# Patient Record
Sex: FEMALE | Race: OTHER | NOT HISPANIC OR LATINO
[De-identification: names, ages, dates, MRNs, and addresses within clinical notes are randomized per-mention and may not be internally consistent; named-entity substitution may affect disease eponyms.]

---

## 2017-01-23 ENCOUNTER — APPOINTMENT (OUTPATIENT)
Dept: NEPHROLOGY | Facility: CLINIC | Age: 77
End: 2017-01-23

## 2017-01-23 VITALS
SYSTOLIC BLOOD PRESSURE: 136 MMHG | BODY MASS INDEX: 39.26 KG/M2 | HEART RATE: 94 BPM | HEIGHT: 64 IN | WEIGHT: 229.94 LBS | DIASTOLIC BLOOD PRESSURE: 62 MMHG

## 2017-01-23 VITALS — SYSTOLIC BLOOD PRESSURE: 138 MMHG | DIASTOLIC BLOOD PRESSURE: 60 MMHG

## 2017-02-06 ENCOUNTER — LABORATORY RESULT (OUTPATIENT)
Age: 77
End: 2017-02-06

## 2017-02-06 LAB
ALBUMIN SERPL ELPH-MCNC: 4 G/DL
ANION GAP SERPL CALC-SCNC: 18 MMOL/L
APPEARANCE: CLEAR
BACTERIA: ABNORMAL
BASOPHILS # BLD AUTO: 0.06 K/UL
BASOPHILS NFR BLD AUTO: 0.8 %
BILIRUBIN URINE: NEGATIVE
BLOOD URINE: NEGATIVE
BUN SERPL-MCNC: 52 MG/DL
CALCIUM SERPL-MCNC: 9.2 MG/DL
CALCIUM SERPL-MCNC: 9.2 MG/DL
CHLORIDE SERPL-SCNC: 97 MMOL/L
CO2 SERPL-SCNC: 22 MMOL/L
COLOR: YELLOW
CREAT SERPL-MCNC: 3.59 MG/DL
CREAT SPEC-SCNC: 83 MG/DL
CREAT SPEC-SCNC: 83 MG/DL
CREAT/PROT UR: 2.5 RATIO
EOSINOPHIL # BLD AUTO: 0.49 K/UL
EOSINOPHIL NFR BLD AUTO: 6.7 %
FERRITIN SERPL-MCNC: 81.2 NG/ML
GLUCOSE QUALITATIVE U: NORMAL MG/DL
GLUCOSE SERPL-MCNC: 93 MG/DL
HBA1C MFR BLD HPLC: 5.8 %
HBV CORE IGG+IGM SER QL: NONREACTIVE
HBV SURFACE AB SER QL: REACTIVE
HBV SURFACE AG SER QL: NONREACTIVE
HCT VFR BLD CALC: 33.7 %
HCV AB SER QL: NONREACTIVE
HCV S/CO RATIO: 0.12 S/CO
HGB BLD-MCNC: 10.7 G/DL
HYALINE CASTS: 4 /LPF
IMM GRANULOCYTES NFR BLD AUTO: 0.4 %
IRON SATN MFR SERPL: 19 %
IRON SERPL-MCNC: 61 UG/DL
KETONES URINE: NEGATIVE
LEUKOCYTE ESTERASE URINE: NEGATIVE
LYMPHOCYTES # BLD AUTO: 1.46 K/UL
LYMPHOCYTES NFR BLD AUTO: 20 %
MAGNESIUM SERPL-MCNC: 2.3 MG/DL
MAN DIFF?: NORMAL
MCHC RBC-ENTMCNC: 28.1 PG
MCHC RBC-ENTMCNC: 31.8 GM/DL
MCV RBC AUTO: 88.5 FL
MICROALBUMIN 24H UR DL<=1MG/L-MCNC: 132 MG/DL
MICROALBUMIN/CREAT 24H UR-RTO: 1590 UG/MG
MICROSCOPIC-UA: NORMAL
MONOCYTES # BLD AUTO: 0.34 K/UL
MONOCYTES NFR BLD AUTO: 4.7 %
NEUTROPHILS # BLD AUTO: 4.92 K/UL
NEUTROPHILS NFR BLD AUTO: 67.4 %
NITRITE URINE: NEGATIVE
PARATHYROID HORMONE INTACT: 334 PG/ML
PH URINE: >8.5
PHOSPHATE SERPL-MCNC: 4.7 MG/DL
PLATELET # BLD AUTO: 292 K/UL
POTASSIUM SERPL-SCNC: 5.1 MMOL/L
PROT UR-MCNC: 208 MG/DL
PROTEIN URINE: 300 MG/DL
RBC # BLD: 3.81 M/UL
RBC # FLD: 13.8 %
RED BLOOD CELLS URINE: 4 /HPF
SODIUM SERPL-SCNC: 137 MMOL/L
SPECIFIC GRAVITY URINE: 1.02
SQUAMOUS EPITHELIAL CELLS: 12 /HPF
TIBC SERPL-MCNC: 329 UG/DL
UIBC SERPL-MCNC: 268 UG/DL
URATE SERPL-MCNC: 7.3 MG/DL
UROBILINOGEN URINE: NORMAL MG/DL
WBC # FLD AUTO: 7.3 K/UL
WHITE BLOOD CELLS URINE: 4 /HPF

## 2017-02-07 LAB
25(OH)D3 SERPL-MCNC: 26.6 NG/ML
C3 SERPL-MCNC: 111 MG/DL
IGA 24H UR QL IFE: NORMAL

## 2017-02-08 ENCOUNTER — APPOINTMENT (OUTPATIENT)
Dept: NEPHROLOGY | Facility: CLINIC | Age: 77
End: 2017-02-08

## 2017-02-08 VITALS — WEIGHT: 225.97 LBS | BODY MASS INDEX: 41.58 KG/M2 | OXYGEN SATURATION: 98 % | HEIGHT: 62 IN | HEART RATE: 80 BPM

## 2017-02-08 VITALS — SYSTOLIC BLOOD PRESSURE: 132 MMHG | DIASTOLIC BLOOD PRESSURE: 64 MMHG

## 2017-02-13 ENCOUNTER — APPOINTMENT (OUTPATIENT)
Dept: NEPHROLOGY | Facility: CLINIC | Age: 77
End: 2017-02-13

## 2017-03-29 ENCOUNTER — APPOINTMENT (OUTPATIENT)
Dept: NEPHROLOGY | Facility: CLINIC | Age: 77
End: 2017-03-29

## 2017-03-29 VITALS
SYSTOLIC BLOOD PRESSURE: 126 MMHG | HEIGHT: 62 IN | OXYGEN SATURATION: 98 % | DIASTOLIC BLOOD PRESSURE: 64 MMHG | BODY MASS INDEX: 41.44 KG/M2 | WEIGHT: 225.17 LBS | HEART RATE: 86 BPM

## 2017-03-29 VITALS — SYSTOLIC BLOOD PRESSURE: 128 MMHG | DIASTOLIC BLOOD PRESSURE: 66 MMHG | HEART RATE: 84 BPM

## 2017-03-29 DIAGNOSIS — E79.0 HYPERURICEMIA W/OUT SIGNS OF INFLAMMATORY ARTHRITIS AND TOPHACEOUS DISEASE: ICD-10-CM

## 2017-04-18 ENCOUNTER — APPOINTMENT (OUTPATIENT)
Dept: OPHTHALMOLOGY | Facility: CLINIC | Age: 77
End: 2017-04-18

## 2017-04-18 LAB
25(OH)D3 SERPL-MCNC: 26.8 NG/ML
ALBUMIN SERPL ELPH-MCNC: 3.8 G/DL
ANION GAP SERPL CALC-SCNC: 18 MMOL/L
APPEARANCE: CLEAR
BACTERIA: ABNORMAL
BASOPHILS # BLD AUTO: 0.04 K/UL
BASOPHILS NFR BLD AUTO: 0.8 %
BILIRUBIN URINE: NEGATIVE
BLOOD URINE: NEGATIVE
BUN SERPL-MCNC: 60 MG/DL
CALCIUM SERPL-MCNC: 9.2 MG/DL
CHLORIDE SERPL-SCNC: 101 MMOL/L
CO2 SERPL-SCNC: 24 MMOL/L
COLOR: YELLOW
CREAT SERPL-MCNC: 4.01 MG/DL
CREAT SPEC-SCNC: 57 MG/DL
CREAT/PROT UR: 3.3 RATIO
EOSINOPHIL # BLD AUTO: 0.22 K/UL
EOSINOPHIL NFR BLD AUTO: 4.2 %
GLUCOSE QUALITATIVE U: NORMAL MG/DL
GLUCOSE SERPL-MCNC: 83 MG/DL
HCT VFR BLD CALC: 30.8 %
HGB BLD-MCNC: 9.7 G/DL
HYALINE CASTS: 4 /LPF
IMM GRANULOCYTES NFR BLD AUTO: 0.2 %
KETONES URINE: NEGATIVE
LEUKOCYTE ESTERASE URINE: NEGATIVE
LYMPHOCYTES # BLD AUTO: 1.24 K/UL
LYMPHOCYTES NFR BLD AUTO: 23.8 %
MAGNESIUM SERPL-MCNC: 2.4 MG/DL
MAN DIFF?: NORMAL
MCHC RBC-ENTMCNC: 28.3 PG
MCHC RBC-ENTMCNC: 31.5 GM/DL
MCV RBC AUTO: 89.8 FL
MICROSCOPIC-UA: NORMAL
MONOCYTES # BLD AUTO: 0.38 K/UL
MONOCYTES NFR BLD AUTO: 7.3 %
NEUTROPHILS # BLD AUTO: 3.32 K/UL
NEUTROPHILS NFR BLD AUTO: 63.7 %
NITRITE URINE: NEGATIVE
PH URINE: 8.5
PHOSPHATE SERPL-MCNC: 5.7 MG/DL
PLATELET # BLD AUTO: 261 K/UL
POTASSIUM SERPL-SCNC: 5.4 MMOL/L
PROT UR-MCNC: 190 MG/DL
PROTEIN URINE: 300 MG/DL
RBC # BLD: 3.43 M/UL
RBC # FLD: 14.3 %
RED BLOOD CELLS URINE: 10 /HPF
SODIUM SERPL-SCNC: 143 MMOL/L
SPECIFIC GRAVITY URINE: 1.02
SQUAMOUS EPITHELIAL CELLS: 7 /HPF
URATE SERPL-MCNC: 5.3 MG/DL
UROBILINOGEN URINE: NORMAL MG/DL
WBC # FLD AUTO: 5.21 K/UL
WHITE BLOOD CELLS URINE: 3 /HPF

## 2017-04-19 ENCOUNTER — APPOINTMENT (OUTPATIENT)
Dept: NEPHROLOGY | Facility: CLINIC | Age: 77
End: 2017-04-19

## 2017-04-19 VITALS
DIASTOLIC BLOOD PRESSURE: 62 MMHG | BODY MASS INDEX: 41.22 KG/M2 | HEART RATE: 76 BPM | SYSTOLIC BLOOD PRESSURE: 130 MMHG | HEIGHT: 62 IN | OXYGEN SATURATION: 98 % | WEIGHT: 224 LBS

## 2017-04-19 RX ORDER — ERYTHROPOIETIN 20000 [IU]/ML
20000 INJECTION, SOLUTION INTRAVENOUS; SUBCUTANEOUS
Qty: 1 | Refills: 0 | Status: COMPLETED | OUTPATIENT
Start: 2017-04-19

## 2017-04-19 RX ADMIN — ERYTHROPOIETIN 0 UNIT/ML: 20000 INJECTION, SOLUTION INTRAVENOUS; SUBCUTANEOUS at 00:00

## 2017-05-02 ENCOUNTER — APPOINTMENT (OUTPATIENT)
Dept: NEPHROLOGY | Facility: CLINIC | Age: 77
End: 2017-05-02

## 2017-05-02 VITALS
DIASTOLIC BLOOD PRESSURE: 68 MMHG | OXYGEN SATURATION: 97 % | SYSTOLIC BLOOD PRESSURE: 128 MMHG | HEIGHT: 62 IN | BODY MASS INDEX: 41.18 KG/M2 | WEIGHT: 223.77 LBS | HEART RATE: 91 BPM

## 2017-05-02 RX ORDER — ERYTHROPOIETIN 20000 [IU]/ML
20000 INJECTION, SOLUTION INTRAVENOUS; SUBCUTANEOUS
Qty: 1 | Refills: 0 | Status: COMPLETED | OUTPATIENT
Start: 2017-05-02

## 2017-05-02 RX ADMIN — ERYTHROPOIETIN 0 UNIT/ML: 20000 INJECTION, SOLUTION INTRAVENOUS; SUBCUTANEOUS at 00:00

## 2017-05-03 ENCOUNTER — APPOINTMENT (OUTPATIENT)
Dept: NEPHROLOGY | Facility: CLINIC | Age: 77
End: 2017-05-03

## 2017-05-03 ENCOUNTER — APPOINTMENT (OUTPATIENT)
Dept: TRANSPLANT | Facility: CLINIC | Age: 77
End: 2017-05-03

## 2017-05-08 ENCOUNTER — APPOINTMENT (OUTPATIENT)
Dept: OPHTHALMOLOGY | Facility: CLINIC | Age: 77
End: 2017-05-08

## 2017-05-09 ENCOUNTER — APPOINTMENT (OUTPATIENT)
Dept: OPHTHALMOLOGY | Facility: CLINIC | Age: 77
End: 2017-05-09

## 2017-05-12 ENCOUNTER — LABORATORY RESULT (OUTPATIENT)
Age: 77
End: 2017-05-12

## 2017-05-15 LAB
25(OH)D3 SERPL-MCNC: 28.8 NG/ML
ALBUMIN SERPL ELPH-MCNC: 4 G/DL
ALP BLD-CCNC: 63 U/L
ALT SERPL-CCNC: 7 U/L
ANION GAP SERPL CALC-SCNC: 20 MMOL/L
APPEARANCE: CLEAR
AST SERPL-CCNC: 15 U/L
BACTERIA: NEGATIVE
BASOPHILS # BLD AUTO: 0.05 K/UL
BASOPHILS NFR BLD AUTO: 0.9 %
BILIRUB SERPL-MCNC: 0.3 MG/DL
BILIRUBIN URINE: NEGATIVE
BLOOD URINE: NEGATIVE
BUN SERPL-MCNC: 60 MG/DL
CALCIUM SERPL-MCNC: 9.7 MG/DL
CALCIUM SERPL-MCNC: 9.7 MG/DL
CHLORIDE SERPL-SCNC: 100 MMOL/L
CO2 SERPL-SCNC: 21 MMOL/L
COLOR: YELLOW
CREAT SERPL-MCNC: 4.36 MG/DL
CREAT SPEC-SCNC: 61 MG/DL
CREAT SPEC-SCNC: 61 MG/DL
CREAT/PROT UR: 3.7
EOSINOPHIL # BLD AUTO: 0.16 K/UL
EOSINOPHIL NFR BLD AUTO: 2.7 %
FERRITIN SERPL-MCNC: 24 NG/ML
GLUCOSE QUALITATIVE U: NORMAL MG/DL
GLUCOSE SERPL-MCNC: 94 MG/DL
HBA1C MFR BLD HPLC: 5.3 %
HCT VFR BLD CALC: 33.3 %
HGB BLD-MCNC: 10.4 G/DL
IGA 24H UR QL IFE: NORMAL
IMM GRANULOCYTES NFR BLD AUTO: 0.2 %
IRON SATN MFR SERPL: 13 %
IRON SERPL-MCNC: 39 UG/DL
KETONES URINE: NEGATIVE
LEUKOCYTE ESTERASE URINE: NEGATIVE
LYMPHOCYTES # BLD AUTO: 1.37 K/UL
LYMPHOCYTES NFR BLD AUTO: 23.4 %
MAGNESIUM SERPL-MCNC: 2.4 MG/DL
MAN DIFF?: NORMAL
MCHC RBC-ENTMCNC: 27.7 PG
MCHC RBC-ENTMCNC: 31.2 GM/DL
MCV RBC AUTO: 88.8 FL
MICROALBUMIN 24H UR DL<=1MG/L-MCNC: 150.9 MG/DL
MICROALBUMIN/CREAT 24H UR-RTO: 2474
MICROSCOPIC-UA: NORMAL
MONOCYTES # BLD AUTO: 0.44 K/UL
MONOCYTES NFR BLD AUTO: 7.5 %
NEUTROPHILS # BLD AUTO: 3.83 K/UL
NEUTROPHILS NFR BLD AUTO: 65.3 %
NITRITE URINE: NEGATIVE
PARATHYROID HORMONE INTACT: 361 PG/ML
PH URINE: 7.5
PHOSPHATE SERPL-MCNC: 5.5 MG/DL
PLATELET # BLD AUTO: 331 K/UL
POTASSIUM SERPL-SCNC: 4.6 MMOL/L
PROT SERPL-MCNC: 6.9 G/DL
PROT UR-MCNC: 227 MG/DL
PROTEIN URINE: 300 MG/DL
RBC # BLD: 3.75 M/UL
RBC # FLD: 14.5 %
RED BLOOD CELLS URINE: 1 /HPF
SODIUM SERPL-SCNC: 141 MMOL/L
SPECIFIC GRAVITY URINE: 1.02
SQUAMOUS EPITHELIAL CELLS: 5 /HPF
TIBC SERPL-MCNC: 302 UG/DL
TSH SERPL-ACNC: 2.15 UIU/ML
UIBC SERPL-MCNC: 263 UG/DL
URATE SERPL-MCNC: 4 MG/DL
UROBILINOGEN URINE: NORMAL MG/DL
WBC # FLD AUTO: 5.86 K/UL
WHITE BLOOD CELLS URINE: 1 /HPF

## 2017-05-16 ENCOUNTER — APPOINTMENT (OUTPATIENT)
Dept: NEPHROLOGY | Facility: CLINIC | Age: 77
End: 2017-05-16

## 2017-05-16 VITALS
WEIGHT: 220.46 LBS | BODY MASS INDEX: 40.57 KG/M2 | SYSTOLIC BLOOD PRESSURE: 138 MMHG | DIASTOLIC BLOOD PRESSURE: 64 MMHG | HEIGHT: 62 IN | HEART RATE: 92 BPM | OXYGEN SATURATION: 98 %

## 2017-05-16 VITALS — SYSTOLIC BLOOD PRESSURE: 136 MMHG | DIASTOLIC BLOOD PRESSURE: 62 MMHG

## 2017-07-06 LAB
25(OH)D3 SERPL-MCNC: 30.4 NG/ML
ALBUMIN SERPL ELPH-MCNC: 4.2 G/DL
ANION GAP SERPL CALC-SCNC: 20 MMOL/L
BASOPHILS # BLD AUTO: 0.03 K/UL
BASOPHILS NFR BLD AUTO: 0.5 %
BUN SERPL-MCNC: 56 MG/DL
CALCIUM SERPL-MCNC: 9.3 MG/DL
CHLORIDE SERPL-SCNC: 97 MMOL/L
CO2 SERPL-SCNC: 22 MMOL/L
CREAT SERPL-MCNC: 4.83 MG/DL
EOSINOPHIL # BLD AUTO: 0.18 K/UL
EOSINOPHIL NFR BLD AUTO: 3.1 %
GLUCOSE SERPL-MCNC: 98 MG/DL
HCT VFR BLD CALC: 33.6 %
HGB BLD-MCNC: 11 G/DL
IMM GRANULOCYTES NFR BLD AUTO: 0.2 %
LYMPHOCYTES # BLD AUTO: 1.33 K/UL
LYMPHOCYTES NFR BLD AUTO: 22.7 %
MAGNESIUM SERPL-MCNC: 2.5 MG/DL
MAN DIFF?: NORMAL
MCHC RBC-ENTMCNC: 28.5 PG
MCHC RBC-ENTMCNC: 32.7 GM/DL
MCV RBC AUTO: 87 FL
MONOCYTES # BLD AUTO: 0.38 K/UL
MONOCYTES NFR BLD AUTO: 6.5 %
NEUTROPHILS # BLD AUTO: 3.92 K/UL
NEUTROPHILS NFR BLD AUTO: 67 %
PHOSPHATE SERPL-MCNC: 5.6 MG/DL
PLATELET # BLD AUTO: 294 K/UL
POTASSIUM SERPL-SCNC: 4.7 MMOL/L
RBC # BLD: 3.86 M/UL
RBC # FLD: 15.3 %
SODIUM SERPL-SCNC: 139 MMOL/L
URATE SERPL-MCNC: 4.7 MG/DL
WBC # FLD AUTO: 5.85 K/UL

## 2017-07-12 ENCOUNTER — APPOINTMENT (OUTPATIENT)
Dept: NEPHROLOGY | Facility: CLINIC | Age: 77
End: 2017-07-12

## 2017-07-12 VITALS
BODY MASS INDEX: 41.38 KG/M2 | HEIGHT: 62 IN | HEART RATE: 87 BPM | WEIGHT: 224.87 LBS | OXYGEN SATURATION: 95 % | SYSTOLIC BLOOD PRESSURE: 136 MMHG | DIASTOLIC BLOOD PRESSURE: 66 MMHG

## 2017-07-12 VITALS — DIASTOLIC BLOOD PRESSURE: 64 MMHG | SYSTOLIC BLOOD PRESSURE: 138 MMHG

## 2017-08-08 LAB
BASOPHILS # BLD AUTO: 0.04 K/UL
BASOPHILS NFR BLD AUTO: 0.7 %
EOSINOPHIL # BLD AUTO: 0.19 K/UL
EOSINOPHIL NFR BLD AUTO: 3.3 %
HCT VFR BLD CALC: 29.6 %
HGB BLD-MCNC: 9.7 G/DL
IMM GRANULOCYTES NFR BLD AUTO: 0.2 %
LYMPHOCYTES # BLD AUTO: 1.08 K/UL
LYMPHOCYTES NFR BLD AUTO: 18.6 %
MAN DIFF?: NORMAL
MCHC RBC-ENTMCNC: 28.3 PG
MCHC RBC-ENTMCNC: 32.8 GM/DL
MCV RBC AUTO: 86.3 FL
MONOCYTES # BLD AUTO: 0.32 K/UL
MONOCYTES NFR BLD AUTO: 5.5 %
NEUTROPHILS # BLD AUTO: 4.18 K/UL
NEUTROPHILS NFR BLD AUTO: 71.7 %
PLATELET # BLD AUTO: 265 K/UL
RBC # BLD: 3.43 M/UL
RBC # FLD: 16.3 %
WBC # FLD AUTO: 5.82 K/UL

## 2017-08-09 ENCOUNTER — APPOINTMENT (OUTPATIENT)
Dept: NEPHROLOGY | Facility: CLINIC | Age: 77
End: 2017-08-09
Payer: MEDICARE

## 2017-08-09 VITALS
HEART RATE: 84 BPM | DIASTOLIC BLOOD PRESSURE: 64 MMHG | SYSTOLIC BLOOD PRESSURE: 142 MMHG | HEIGHT: 62 IN | BODY MASS INDEX: 41.41 KG/M2 | WEIGHT: 225 LBS

## 2017-08-09 VITALS — SYSTOLIC BLOOD PRESSURE: 138 MMHG | DIASTOLIC BLOOD PRESSURE: 62 MMHG

## 2017-08-09 LAB
25(OH)D3 SERPL-MCNC: 23.4 NG/ML
ALBUMIN SERPL ELPH-MCNC: 3.9 G/DL
ALP BLD-CCNC: 67 U/L
ALT SERPL-CCNC: 6 U/L
ANION GAP SERPL CALC-SCNC: 19 MMOL/L
APPEARANCE: CLEAR
AST SERPL-CCNC: 10 U/L
BACTERIA: ABNORMAL
BILIRUB SERPL-MCNC: 0.2 MG/DL
BILIRUBIN URINE: NEGATIVE
BLOOD URINE: NEGATIVE
BUN SERPL-MCNC: 61 MG/DL
CALCIUM SERPL-MCNC: 9.3 MG/DL
CALCIUM SERPL-MCNC: 9.3 MG/DL
CHLORIDE SERPL-SCNC: 105 MMOL/L
CHOLEST SERPL-MCNC: 161 MG/DL
CHOLEST/HDLC SERPL: 2.4 RATIO
CO2 SERPL-SCNC: 20 MMOL/L
COLOR: YELLOW
CREAT SERPL-MCNC: 5.07 MG/DL
CREAT SPEC-SCNC: 55 MG/DL
CREAT SPEC-SCNC: 55 MG/DL
CREAT/PROT UR: 6.1 RATIO
FERRITIN SERPL-MCNC: 95 NG/ML
GLUCOSE QUALITATIVE U: 100 MG/DL
GLUCOSE SERPL-MCNC: 103 MG/DL
HBA1C MFR BLD HPLC: 5.1 %
HBV CORE IGG+IGM SER QL: NONREACTIVE
HBV SURFACE AB SER QL: NONREACTIVE
HBV SURFACE AG SER QL: NONREACTIVE
HCV AB SER QL: NONREACTIVE
HCV S/CO RATIO: 0.09 S/CO
HDLC SERPL-MCNC: 68 MG/DL
HYALINE CASTS: 0 /LPF
IRON SATN MFR SERPL: 16 %
IRON SERPL-MCNC: 48 UG/DL
KETONES URINE: NEGATIVE
LDLC SERPL CALC-MCNC: 71 MG/DL
LEUKOCYTE ESTERASE URINE: NEGATIVE
MAGNESIUM SERPL-MCNC: 2.4 MG/DL
MICROALBUMIN 24H UR DL<=1MG/L-MCNC: 211.9 MG/DL
MICROALBUMIN/CREAT 24H UR-RTO: 3853 MG/G
MICROSCOPIC-UA: NORMAL
NITRITE URINE: NEGATIVE
PARATHYROID HORMONE INTACT: 501 PG/ML
PH URINE: 8
PHOSPHATE SERPL-MCNC: 6 MG/DL
POTASSIUM SERPL-SCNC: 5.8 MMOL/L
PROT SERPL-MCNC: 6.5 G/DL
PROT UR-MCNC: 338 MG/DL
PROTEIN URINE: 300 MG/DL
RED BLOOD CELLS URINE: 1 /HPF
SODIUM SERPL-SCNC: 144 MMOL/L
SPECIFIC GRAVITY URINE: 1.01
SQUAMOUS EPITHELIAL CELLS: 8 /HPF
TIBC SERPL-MCNC: 309 UG/DL
TRIGL SERPL-MCNC: 109 MG/DL
TSH SERPL-ACNC: 4.53 UIU/ML
UIBC SERPL-MCNC: 261 UG/DL
URATE SERPL-MCNC: 4 MG/DL
UROBILINOGEN URINE: NORMAL MG/DL
WHITE BLOOD CELLS URINE: 2 /HPF

## 2017-08-09 PROCEDURE — 96372 THER/PROPH/DIAG INJ SC/IM: CPT

## 2017-08-09 PROCEDURE — 99215 OFFICE O/P EST HI 40 MIN: CPT | Mod: 25

## 2017-08-09 RX ADMIN — ERYTHROPOIETIN 0 UNIT/ML: 20000 INJECTION, SOLUTION INTRAVENOUS; SUBCUTANEOUS at 00:00

## 2017-08-22 ENCOUNTER — FORM ENCOUNTER (OUTPATIENT)
Age: 77
End: 2017-08-22

## 2017-08-22 ENCOUNTER — APPOINTMENT (OUTPATIENT)
Dept: NEPHROLOGY | Facility: CLINIC | Age: 77
End: 2017-08-22
Payer: MEDICARE

## 2017-08-22 VITALS
BODY MASS INDEX: 41.38 KG/M2 | DIASTOLIC BLOOD PRESSURE: 58 MMHG | HEIGHT: 62 IN | SYSTOLIC BLOOD PRESSURE: 156 MMHG | WEIGHT: 224.88 LBS

## 2017-08-22 VITALS — WEIGHT: 224.87 LBS | BODY MASS INDEX: 41.38 KG/M2 | HEIGHT: 62 IN | OXYGEN SATURATION: 98 % | HEART RATE: 91 BPM

## 2017-08-22 LAB
ALBUMIN SERPL ELPH-MCNC: 3.8 G/DL
ANION GAP SERPL CALC-SCNC: 18 MMOL/L
BASOPHILS # BLD AUTO: 0.05 K/UL
BASOPHILS NFR BLD AUTO: 0.9 %
BUN SERPL-MCNC: 54 MG/DL
CALCIUM SERPL-MCNC: 9.3 MG/DL
CHLORIDE SERPL-SCNC: 98 MMOL/L
CO2 SERPL-SCNC: 21 MMOL/L
CREAT SERPL-MCNC: 5.59 MG/DL
EOSINOPHIL # BLD AUTO: 0.19 K/UL
EOSINOPHIL NFR BLD AUTO: 3.6 %
GLUCOSE SERPL-MCNC: 97 MG/DL
HCT VFR BLD CALC: 29 %
HGB BLD-MCNC: 9.4 G/DL
IMM GRANULOCYTES NFR BLD AUTO: 0.2 %
LYMPHOCYTES # BLD AUTO: 1.19 K/UL
LYMPHOCYTES NFR BLD AUTO: 22.4 %
MAN DIFF?: NORMAL
MCHC RBC-ENTMCNC: 28.4 PG
MCHC RBC-ENTMCNC: 32.4 GM/DL
MCV RBC AUTO: 87.6 FL
MONOCYTES # BLD AUTO: 0.37 K/UL
MONOCYTES NFR BLD AUTO: 7 %
NEUTROPHILS # BLD AUTO: 3.5 K/UL
NEUTROPHILS NFR BLD AUTO: 65.9 %
PHOSPHATE SERPL-MCNC: 6.3 MG/DL
PLATELET # BLD AUTO: 277 K/UL
POTASSIUM SERPL-SCNC: 4.8 MMOL/L
RBC # BLD: 3.31 M/UL
RBC # FLD: 16.1 %
SODIUM SERPL-SCNC: 137 MMOL/L
WBC # FLD AUTO: 5.31 K/UL

## 2017-08-22 PROCEDURE — 96372 THER/PROPH/DIAG INJ SC/IM: CPT

## 2017-08-22 RX ORDER — ERYTHROPOIETIN 20000 [IU]/ML
20000 INJECTION, SOLUTION INTRAVENOUS; SUBCUTANEOUS
Qty: 1 | Refills: 0 | Status: COMPLETED | OUTPATIENT
Start: 2017-08-09

## 2017-08-22 RX ADMIN — ERYTHROPOIETIN 0 UNIT/ML: 20000 INJECTION, SOLUTION INTRAVENOUS; SUBCUTANEOUS at 00:00

## 2017-08-23 ENCOUNTER — APPOINTMENT (OUTPATIENT)
Dept: MAMMOGRAPHY | Facility: IMAGING CENTER | Age: 77
End: 2017-08-23
Payer: MEDICARE

## 2017-08-23 ENCOUNTER — APPOINTMENT (OUTPATIENT)
Dept: ULTRASOUND IMAGING | Facility: IMAGING CENTER | Age: 77
End: 2017-08-23
Payer: MEDICARE

## 2017-08-23 ENCOUNTER — OUTPATIENT (OUTPATIENT)
Dept: OUTPATIENT SERVICES | Facility: HOSPITAL | Age: 77
LOS: 1 days | End: 2017-08-23
Payer: MEDICARE

## 2017-08-23 ENCOUNTER — APPOINTMENT (OUTPATIENT)
Dept: RADIOLOGY | Facility: IMAGING CENTER | Age: 77
End: 2017-08-23
Payer: MEDICARE

## 2017-08-23 DIAGNOSIS — E10.29 TYPE 1 DIABETES MELLITUS WITH OTHER DIABETIC KIDNEY COMPLICATION: ICD-10-CM

## 2017-08-23 DIAGNOSIS — N25.81 SECONDARY HYPERPARATHYROIDISM OF RENAL ORIGIN: ICD-10-CM

## 2017-08-23 DIAGNOSIS — N18.5 CHRONIC KIDNEY DISEASE, STAGE 5: ICD-10-CM

## 2017-08-23 PROCEDURE — G0202: CPT | Mod: 26,52,LT

## 2017-08-23 PROCEDURE — 76641 ULTRASOUND BREAST COMPLETE: CPT | Mod: 26,LT

## 2017-08-23 PROCEDURE — 77067 SCR MAMMO BI INCL CAD: CPT

## 2017-08-23 PROCEDURE — 77063 BREAST TOMOSYNTHESIS BI: CPT | Mod: 26,52

## 2017-08-23 PROCEDURE — 76641 ULTRASOUND BREAST COMPLETE: CPT

## 2017-08-23 PROCEDURE — 71020: CPT | Mod: 26

## 2017-08-23 PROCEDURE — 77063 BREAST TOMOSYNTHESIS BI: CPT

## 2017-08-23 PROCEDURE — 71046 X-RAY EXAM CHEST 2 VIEWS: CPT

## 2017-10-02 ENCOUNTER — APPOINTMENT (OUTPATIENT)
Dept: NEPHROLOGY | Facility: CLINIC | Age: 77
End: 2017-10-02
Payer: MEDICARE

## 2017-10-02 VITALS
DIASTOLIC BLOOD PRESSURE: 82 MMHG | WEIGHT: 218 LBS | OXYGEN SATURATION: 97 % | HEART RATE: 84 BPM | SYSTOLIC BLOOD PRESSURE: 132 MMHG | BODY MASS INDEX: 40.12 KG/M2 | HEIGHT: 62 IN

## 2017-10-02 VITALS — DIASTOLIC BLOOD PRESSURE: 78 MMHG | SYSTOLIC BLOOD PRESSURE: 128 MMHG | HEART RATE: 92 BPM

## 2017-10-02 DIAGNOSIS — R60.9 EDEMA, UNSPECIFIED: ICD-10-CM

## 2017-10-02 PROCEDURE — 96372 THER/PROPH/DIAG INJ SC/IM: CPT

## 2017-10-02 PROCEDURE — 99215 OFFICE O/P EST HI 40 MIN: CPT | Mod: 25

## 2017-10-02 RX ORDER — ERYTHROPOIETIN 20000 [IU]/ML
20000 INJECTION, SOLUTION INTRAVENOUS; SUBCUTANEOUS
Qty: 1 | Refills: 0 | Status: COMPLETED | OUTPATIENT
Start: 2017-10-02

## 2017-10-02 RX ADMIN — ERYTHROPOIETIN 0 UNIT/ML: 20000 INJECTION, SOLUTION INTRAVENOUS; SUBCUTANEOUS at 00:00

## 2017-10-10 ENCOUNTER — APPOINTMENT (OUTPATIENT)
Dept: OPHTHALMOLOGY | Facility: CLINIC | Age: 77
End: 2017-10-10
Payer: MEDICARE

## 2017-10-10 PROCEDURE — 92134 CPTRZ OPH DX IMG PST SGM RTA: CPT

## 2017-10-10 PROCEDURE — 92014 COMPRE OPH EXAM EST PT 1/>: CPT

## 2017-10-10 PROCEDURE — 92225: CPT | Mod: RT

## 2017-10-11 ENCOUNTER — APPOINTMENT (OUTPATIENT)
Dept: OPHTHALMOLOGY | Facility: CLINIC | Age: 77
End: 2017-10-11
Payer: MEDICARE

## 2017-10-11 PROCEDURE — 92134 CPTRZ OPH DX IMG PST SGM RTA: CPT

## 2017-10-11 PROCEDURE — 76514 ECHO EXAM OF EYE THICKNESS: CPT

## 2017-10-11 PROCEDURE — 76519 ECHO EXAM OF EYE: CPT

## 2017-10-11 PROCEDURE — 92286 ANT SGM IMG I&R SPECLR MIC: CPT

## 2017-10-11 PROCEDURE — 92014 COMPRE OPH EXAM EST PT 1/>: CPT

## 2017-10-18 ENCOUNTER — APPOINTMENT (OUTPATIENT)
Dept: OPHTHALMOLOGY | Facility: CLINIC | Age: 77
End: 2017-10-18
Payer: MEDICARE

## 2017-10-18 PROCEDURE — 76519 ECHO EXAM OF EYE: CPT

## 2017-10-30 ENCOUNTER — APPOINTMENT (OUTPATIENT)
Dept: NEPHROLOGY | Facility: CLINIC | Age: 77
End: 2017-10-30

## 2017-10-30 LAB
ALBUMIN SERPL ELPH-MCNC: 3.8 G/DL
ANION GAP SERPL CALC-SCNC: 15 MMOL/L
BASOPHILS # BLD AUTO: 0.04 K/UL
BASOPHILS NFR BLD AUTO: 0.6 %
BUN SERPL-MCNC: 71 MG/DL
CALCIUM SERPL-MCNC: 8.7 MG/DL
CHLORIDE SERPL-SCNC: 98 MMOL/L
CO2 SERPL-SCNC: 20 MMOL/L
CREAT SERPL-MCNC: 6.42 MG/DL
EOSINOPHIL # BLD AUTO: 0.23 K/UL
EOSINOPHIL NFR BLD AUTO: 3.5 %
FERRITIN SERPL-MCNC: 112 NG/ML
GLUCOSE SERPL-MCNC: 126 MG/DL
HBV CORE IGG+IGM SER QL: NONREACTIVE
HBV SURFACE AB SER QL: NONREACTIVE
HBV SURFACE AG SER QL: NONREACTIVE
HCT VFR BLD CALC: 31.1 %
HCV AB SER QL: NONREACTIVE
HCV S/CO RATIO: 0.09 S/CO
HGB BLD-MCNC: 9.9 G/DL
IMM GRANULOCYTES NFR BLD AUTO: 0.5 %
IRON SATN MFR SERPL: 16 %
IRON SERPL-MCNC: 45 UG/DL
LYMPHOCYTES # BLD AUTO: 1.13 K/UL
LYMPHOCYTES NFR BLD AUTO: 17.1 %
MAGNESIUM SERPL-MCNC: 2.4 MG/DL
MAN DIFF?: NORMAL
MCHC RBC-ENTMCNC: 28.7 PG
MCHC RBC-ENTMCNC: 31.8 GM/DL
MCV RBC AUTO: 90.1 FL
MONOCYTES # BLD AUTO: 0.36 K/UL
MONOCYTES NFR BLD AUTO: 5.5 %
NEUTROPHILS # BLD AUTO: 4.8 K/UL
NEUTROPHILS NFR BLD AUTO: 72.8 %
PHOSPHATE SERPL-MCNC: 5 MG/DL
PLATELET # BLD AUTO: 259 K/UL
POTASSIUM SERPL-SCNC: 5.2 MMOL/L
RBC # BLD: 3.45 M/UL
RBC # FLD: 15.7 %
SODIUM SERPL-SCNC: 133 MMOL/L
TIBC SERPL-MCNC: 274 UG/DL
UIBC SERPL-MCNC: 229 UG/DL
WBC # FLD AUTO: 6.59 K/UL

## 2017-10-31 ENCOUNTER — APPOINTMENT (OUTPATIENT)
Dept: NEPHROLOGY | Facility: CLINIC | Age: 77
End: 2017-10-31
Payer: MEDICARE

## 2017-10-31 ENCOUNTER — RX RENEWAL (OUTPATIENT)
Age: 77
End: 2017-10-31

## 2017-10-31 ENCOUNTER — APPOINTMENT (OUTPATIENT)
Dept: NEPHROLOGY | Facility: CLINIC | Age: 77
End: 2017-10-31

## 2017-10-31 VITALS
OXYGEN SATURATION: 99 % | HEART RATE: 90 BPM | BODY MASS INDEX: 39.51 KG/M2 | HEIGHT: 62 IN | SYSTOLIC BLOOD PRESSURE: 130 MMHG | DIASTOLIC BLOOD PRESSURE: 80 MMHG | WEIGHT: 214.73 LBS

## 2017-10-31 PROCEDURE — 99214 OFFICE O/P EST MOD 30 MIN: CPT

## 2017-11-14 ENCOUNTER — MEDICATION RENEWAL (OUTPATIENT)
Age: 77
End: 2017-11-14

## 2017-11-15 ENCOUNTER — RX RENEWAL (OUTPATIENT)
Age: 77
End: 2017-11-15

## 2017-11-27 ENCOUNTER — APPOINTMENT (OUTPATIENT)
Dept: OPHTHALMOLOGY | Facility: AMBULATORY MEDICAL SERVICES | Age: 77
End: 2017-11-27
Payer: MEDICARE

## 2017-11-27 PROCEDURE — 6698F: CPT | Mod: RT

## 2017-11-27 PROCEDURE — 66982 XCAPSL CTRC RMVL CPLX WO ECP: CPT | Mod: RT

## 2017-11-28 ENCOUNTER — APPOINTMENT (OUTPATIENT)
Dept: OPHTHALMOLOGY | Facility: CLINIC | Age: 77
End: 2017-11-28
Payer: MEDICARE

## 2017-11-28 PROCEDURE — 99024 POSTOP FOLLOW-UP VISIT: CPT

## 2017-12-05 ENCOUNTER — APPOINTMENT (OUTPATIENT)
Dept: OPHTHALMOLOGY | Facility: CLINIC | Age: 77
End: 2017-12-05
Payer: MEDICARE

## 2017-12-05 DIAGNOSIS — H25.13 AGE-RELATED NUCLEAR CATARACT, BILATERAL: ICD-10-CM

## 2017-12-05 PROCEDURE — 99024 POSTOP FOLLOW-UP VISIT: CPT

## 2017-12-12 ENCOUNTER — OTHER (OUTPATIENT)
Age: 77
End: 2017-12-12

## 2017-12-27 ENCOUNTER — MEDICATION RENEWAL (OUTPATIENT)
Age: 77
End: 2017-12-27

## 2017-12-27 DIAGNOSIS — K21.9 GASTRO-ESOPHAGEAL REFLUX DISEASE W/OUT ESOPHAGITIS: ICD-10-CM

## 2018-01-11 ENCOUNTER — APPOINTMENT (OUTPATIENT)
Dept: OPHTHALMOLOGY | Facility: CLINIC | Age: 78
End: 2018-01-11
Payer: MEDICARE

## 2018-01-11 PROCEDURE — 99024 POSTOP FOLLOW-UP VISIT: CPT

## 2018-01-23 ENCOUNTER — APPOINTMENT (OUTPATIENT)
Dept: CARDIOLOGY | Facility: CLINIC | Age: 78
End: 2018-01-23
Payer: MEDICARE

## 2018-01-23 ENCOUNTER — NON-APPOINTMENT (OUTPATIENT)
Age: 78
End: 2018-01-23

## 2018-01-23 VITALS
HEIGHT: 62 IN | HEART RATE: 94 BPM | SYSTOLIC BLOOD PRESSURE: 128 MMHG | WEIGHT: 189 LBS | BODY MASS INDEX: 34.78 KG/M2 | DIASTOLIC BLOOD PRESSURE: 86 MMHG | OXYGEN SATURATION: 100 %

## 2018-01-23 PROCEDURE — 99205 OFFICE O/P NEW HI 60 MIN: CPT

## 2018-01-23 PROCEDURE — 93000 ELECTROCARDIOGRAM COMPLETE: CPT

## 2018-01-24 ENCOUNTER — APPOINTMENT (OUTPATIENT)
Dept: OPHTHALMOLOGY | Facility: CLINIC | Age: 78
End: 2018-01-24

## 2018-02-14 ENCOUNTER — MEDICATION RENEWAL (OUTPATIENT)
Age: 78
End: 2018-02-14

## 2018-04-18 ENCOUNTER — APPOINTMENT (OUTPATIENT)
Dept: OPHTHALMOLOGY | Facility: CLINIC | Age: 78
End: 2018-04-18
Payer: MEDICARE

## 2018-04-18 PROCEDURE — 92226: CPT | Mod: LT

## 2018-04-18 PROCEDURE — 92014 COMPRE OPH EXAM EST PT 1/>: CPT

## 2018-04-18 PROCEDURE — 92015 DETERMINE REFRACTIVE STATE: CPT

## 2018-04-25 ENCOUNTER — APPOINTMENT (OUTPATIENT)
Dept: CARDIOLOGY | Facility: CLINIC | Age: 78
End: 2018-04-25
Payer: MEDICARE

## 2018-04-25 ENCOUNTER — NON-APPOINTMENT (OUTPATIENT)
Age: 78
End: 2018-04-25

## 2018-04-25 VITALS
WEIGHT: 188 LBS | OXYGEN SATURATION: 98 % | HEIGHT: 62 IN | SYSTOLIC BLOOD PRESSURE: 138 MMHG | HEART RATE: 78 BPM | BODY MASS INDEX: 34.6 KG/M2 | DIASTOLIC BLOOD PRESSURE: 75 MMHG

## 2018-04-25 PROCEDURE — 93000 ELECTROCARDIOGRAM COMPLETE: CPT

## 2018-04-25 PROCEDURE — 99214 OFFICE O/P EST MOD 30 MIN: CPT

## 2018-04-25 PROCEDURE — 93306 TTE W/DOPPLER COMPLETE: CPT

## 2018-04-26 ENCOUNTER — MEDICATION RENEWAL (OUTPATIENT)
Age: 78
End: 2018-04-26

## 2018-05-02 ENCOUNTER — MEDICATION RENEWAL (OUTPATIENT)
Age: 78
End: 2018-05-02

## 2018-06-10 ENCOUNTER — RX RENEWAL (OUTPATIENT)
Age: 78
End: 2018-06-10

## 2018-06-19 ENCOUNTER — MEDICATION RENEWAL (OUTPATIENT)
Age: 78
End: 2018-06-19

## 2018-06-27 ENCOUNTER — APPOINTMENT (OUTPATIENT)
Dept: NEPHROLOGY | Facility: CLINIC | Age: 78
End: 2018-06-27

## 2018-06-27 VITALS
DIASTOLIC BLOOD PRESSURE: 70 MMHG | OXYGEN SATURATION: 100 % | HEART RATE: 82 BPM | HEIGHT: 62 IN | WEIGHT: 198 LBS | BODY MASS INDEX: 36.44 KG/M2 | SYSTOLIC BLOOD PRESSURE: 102 MMHG

## 2018-06-27 RX ORDER — FEBUXOSTAT 40 MG/1
40 TABLET ORAL DAILY
Qty: 90 | Refills: 3 | Status: DISCONTINUED | COMMUNITY
Start: 2017-03-29 | End: 2018-06-27

## 2018-07-18 ENCOUNTER — APPOINTMENT (OUTPATIENT)
Dept: TRANSPLANT | Facility: CLINIC | Age: 78
End: 2018-07-18
Payer: COMMERCIAL

## 2018-07-18 ENCOUNTER — APPOINTMENT (OUTPATIENT)
Dept: TRANSPLANT | Facility: CLINIC | Age: 78
End: 2018-07-18

## 2018-07-18 ENCOUNTER — APPOINTMENT (OUTPATIENT)
Dept: NEPHROLOGY | Facility: CLINIC | Age: 78
End: 2018-07-18
Payer: COMMERCIAL

## 2018-07-18 VITALS
HEART RATE: 72 BPM | OXYGEN SATURATION: 99 % | SYSTOLIC BLOOD PRESSURE: 119 MMHG | WEIGHT: 198.42 LBS | TEMPERATURE: 97.8 F | HEIGHT: 62 IN | BODY MASS INDEX: 36.51 KG/M2 | DIASTOLIC BLOOD PRESSURE: 72 MMHG

## 2018-07-18 PROCEDURE — 99205 OFFICE O/P NEW HI 60 MIN: CPT

## 2018-07-18 PROCEDURE — 99214 OFFICE O/P EST MOD 30 MIN: CPT

## 2018-07-19 LAB
ABO + RH PNL BLD: NORMAL
ABO + RH PNL BLD: NORMAL
ALBUMIN SERPL ELPH-MCNC: 4.1 G/DL
ALP BLD-CCNC: 88 U/L
ALT SERPL-CCNC: 8 U/L
ANION GAP SERPL CALC-SCNC: 14 MMOL/L
AST SERPL-CCNC: 11 U/L
BASOPHILS # BLD AUTO: 0.04 K/UL
BASOPHILS NFR BLD AUTO: 0.8 %
BILIRUB SERPL-MCNC: 0.3 MG/DL
BUN SERPL-MCNC: 35 MG/DL
C PEPTIDE SERPL-MCNC: 16.4 NG/ML
CALCIUM SERPL-MCNC: 9.7 MG/DL
CHLORIDE SERPL-SCNC: 94 MMOL/L
CMV IGG SERPL QL: >10 U/ML
CMV IGG SERPL-IMP: POSITIVE
CO2 SERPL-SCNC: 27 MMOL/L
CREAT SERPL-MCNC: 4.17 MG/DL
EBV EA AB SER IA-ACNC: 10 U/ML
EBV EA AB TITR SER IF: NEGATIVE
EBV EA IGG SER QL IA: <3 U/ML
EBV EA IGG SER-ACNC: ABNORMAL
EBV EA IGM SER IA-ACNC: NEGATIVE
EBV PATRN SPEC IB-IMP: NORMAL
EBV VCA IGG SER IA-ACNC: 47.7 U/ML
EBV VCA IGM SER QL IA: <10 U/ML
EOSINOPHIL # BLD AUTO: 0.13 K/UL
EOSINOPHIL NFR BLD AUTO: 2.5 %
EPSTEIN-BARR VIRUS CAPSID ANTIGEN IGG: POSITIVE
GLUCOSE SERPL-MCNC: 152 MG/DL
HBA1C MFR BLD HPLC: 6.2 %
HBV CORE IGG+IGM SER QL: NONREACTIVE
HBV SURFACE AB SER QL: REACTIVE
HBV SURFACE AG SER QL: NONREACTIVE
HCG SERPL-MCNC: 3 MIU/ML
HCT VFR BLD CALC: 40.7 %
HCV AB SER QL: NONREACTIVE
HCV S/CO RATIO: 0.1 S/CO
HGB BLD-MCNC: 13.4 G/DL
HIV1+2 AB SPEC QL IA.RAPID: NONREACTIVE
HSV 1+2 IGG SER IA-IMP: NEGATIVE
HSV 1+2 IGG SER IA-IMP: POSITIVE
HSV1 IGG SER QL: 40.3 INDEX
HSV2 IGG SER QL: 0.05 INDEX
IMM GRANULOCYTES NFR BLD AUTO: 0.2 %
LYMPHOCYTES # BLD AUTO: 1.47 K/UL
LYMPHOCYTES NFR BLD AUTO: 28.4 %
MAGNESIUM SERPL-MCNC: 2.5 MG/DL
MAN DIFF?: NORMAL
MCHC RBC-ENTMCNC: 32.9 GM/DL
MCHC RBC-ENTMCNC: 32.9 PG
MCV RBC AUTO: 100 FL
MONOCYTES # BLD AUTO: 0.32 K/UL
MONOCYTES NFR BLD AUTO: 6.2 %
NEUTROPHILS # BLD AUTO: 3.21 K/UL
NEUTROPHILS NFR BLD AUTO: 61.9 %
PHOSPHATE SERPL-MCNC: 4.1 MG/DL
PLATELET # BLD AUTO: 177 K/UL
POTASSIUM SERPL-SCNC: 4.6 MMOL/L
PROT SERPL-MCNC: 7.3 G/DL
RBC # BLD: 4.07 M/UL
RBC # FLD: 14.6 %
RUBV IGG FLD-ACNC: 21.2 INDEX
RUBV IGG SER-IMP: POSITIVE
SODIUM SERPL-SCNC: 135 MMOL/L
T GONDII AB SER-IMP: NEGATIVE
T GONDII IGG SER QL: <3 IU/ML
URATE SERPL-MCNC: 3.8 MG/DL
VZV AB TITR SER: POSITIVE
VZV IGG SER IF-ACNC: 3761 INDEX
WBC # FLD AUTO: 5.18 K/UL

## 2018-07-23 LAB
ADJUSTED MITOGEN: >10 IU/ML
ADJUSTED TB AG: 1.13 IU/ML
EBV DNA SERPL NAA+PROBE-ACNC: NOT DETECTED
EBVPCR LOG: NOT DETECTED LOGIU/ML
M TB IFN-G BLD-IMP: POSITIVE
QUANTIFERON GOLD NIL: 0.17 IU/ML

## 2018-08-08 ENCOUNTER — APPOINTMENT (OUTPATIENT)
Dept: OPHTHALMOLOGY | Facility: CLINIC | Age: 78
End: 2018-08-08
Payer: MEDICARE

## 2018-08-08 DIAGNOSIS — H40.003 PREGLAUCOMA, UNSPECIFIED, BILATERAL: ICD-10-CM

## 2018-08-08 PROCEDURE — 92012 INTRM OPH EXAM EST PATIENT: CPT

## 2018-08-22 ENCOUNTER — MEDICATION RENEWAL (OUTPATIENT)
Age: 78
End: 2018-08-22

## 2018-09-10 ENCOUNTER — MEDICATION RENEWAL (OUTPATIENT)
Age: 78
End: 2018-09-10

## 2018-09-20 ENCOUNTER — MEDICATION RENEWAL (OUTPATIENT)
Age: 78
End: 2018-09-20

## 2018-10-03 ENCOUNTER — APPOINTMENT (OUTPATIENT)
Dept: CARDIOLOGY | Facility: CLINIC | Age: 78
End: 2018-10-03

## 2018-10-03 ENCOUNTER — APPOINTMENT (OUTPATIENT)
Dept: RADIOLOGY | Facility: CLINIC | Age: 78
End: 2018-10-03

## 2018-10-03 ENCOUNTER — APPOINTMENT (OUTPATIENT)
Dept: ULTRASOUND IMAGING | Facility: CLINIC | Age: 78
End: 2018-10-03

## 2018-10-25 ENCOUNTER — OTHER (OUTPATIENT)
Age: 78
End: 2018-10-25

## 2018-10-30 ENCOUNTER — APPOINTMENT (OUTPATIENT)
Dept: NEPHROLOGY | Facility: CLINIC | Age: 78
End: 2018-10-30

## 2018-10-30 VITALS
BODY MASS INDEX: 37.17 KG/M2 | DIASTOLIC BLOOD PRESSURE: 57 MMHG | OXYGEN SATURATION: 98 % | HEART RATE: 78 BPM | WEIGHT: 202 LBS | SYSTOLIC BLOOD PRESSURE: 122 MMHG | HEIGHT: 62 IN

## 2018-10-30 RX ORDER — LINAGLIPTIN 5 MG/1
5 TABLET, FILM COATED ORAL
Qty: 90 | Refills: 0 | Status: DISCONTINUED | COMMUNITY
Start: 2018-08-21

## 2018-10-30 RX ORDER — CIPROFLOXACIN HYDROCHLORIDE 250 MG/1
250 TABLET, FILM COATED ORAL
Qty: 10 | Refills: 0 | Status: DISCONTINUED | COMMUNITY
Start: 2018-06-20

## 2018-10-30 RX ORDER — OFLOXACIN OTIC 3 MG/ML
0.3 SOLUTION AURICULAR (OTIC)
Qty: 5 | Refills: 0 | Status: DISCONTINUED | COMMUNITY
Start: 2018-06-20

## 2018-11-01 ENCOUNTER — APPOINTMENT (OUTPATIENT)
Dept: NEPHROLOGY | Facility: CLINIC | Age: 78
End: 2018-11-01

## 2018-12-12 ENCOUNTER — OUTPATIENT (OUTPATIENT)
Dept: OUTPATIENT SERVICES | Facility: HOSPITAL | Age: 78
LOS: 1 days | End: 2018-12-12
Payer: COMMERCIAL

## 2018-12-12 ENCOUNTER — NON-APPOINTMENT (OUTPATIENT)
Age: 78
End: 2018-12-12

## 2018-12-12 ENCOUNTER — APPOINTMENT (OUTPATIENT)
Dept: ULTRASOUND IMAGING | Facility: CLINIC | Age: 78
End: 2018-12-12
Payer: COMMERCIAL

## 2018-12-12 ENCOUNTER — APPOINTMENT (OUTPATIENT)
Dept: RADIOLOGY | Facility: CLINIC | Age: 78
End: 2018-12-12
Payer: COMMERCIAL

## 2018-12-12 ENCOUNTER — APPOINTMENT (OUTPATIENT)
Dept: ULTRASOUND IMAGING | Facility: CLINIC | Age: 78
End: 2018-12-12

## 2018-12-12 ENCOUNTER — APPOINTMENT (OUTPATIENT)
Dept: CARDIOLOGY | Facility: CLINIC | Age: 78
End: 2018-12-12
Payer: COMMERCIAL

## 2018-12-12 VITALS
SYSTOLIC BLOOD PRESSURE: 138 MMHG | DIASTOLIC BLOOD PRESSURE: 78 MMHG | BODY MASS INDEX: 37.36 KG/M2 | HEART RATE: 68 BPM | WEIGHT: 203 LBS | OXYGEN SATURATION: 100 % | HEIGHT: 62 IN

## 2018-12-12 DIAGNOSIS — Z01.818 ENCOUNTER FOR OTHER PREPROCEDURAL EXAMINATION: ICD-10-CM

## 2018-12-12 PROCEDURE — 76700 US EXAM ABDOM COMPLETE: CPT | Mod: 26,59

## 2018-12-12 PROCEDURE — 71046 X-RAY EXAM CHEST 2 VIEWS: CPT | Mod: 26

## 2018-12-12 PROCEDURE — 93975 VASCULAR STUDY: CPT | Mod: 26

## 2018-12-12 PROCEDURE — 93975 VASCULAR STUDY: CPT

## 2018-12-12 PROCEDURE — 71046 X-RAY EXAM CHEST 2 VIEWS: CPT

## 2018-12-12 PROCEDURE — 76700 US EXAM ABDOM COMPLETE: CPT

## 2018-12-12 PROCEDURE — 99214 OFFICE O/P EST MOD 30 MIN: CPT

## 2018-12-13 ENCOUNTER — RX RENEWAL (OUTPATIENT)
Age: 78
End: 2018-12-13

## 2018-12-17 ENCOUNTER — MOBILE ON CALL (OUTPATIENT)
Age: 78
End: 2018-12-17

## 2018-12-19 ENCOUNTER — MEDICATION RENEWAL (OUTPATIENT)
Age: 78
End: 2018-12-19

## 2018-12-19 ENCOUNTER — OUTPATIENT (OUTPATIENT)
Dept: OUTPATIENT SERVICES | Facility: HOSPITAL | Age: 78
LOS: 1 days | End: 2018-12-19
Payer: COMMERCIAL

## 2018-12-19 ENCOUNTER — APPOINTMENT (OUTPATIENT)
Dept: CV DIAGNOSTICS | Facility: HOSPITAL | Age: 78
End: 2018-12-19

## 2018-12-19 DIAGNOSIS — I25.10 ATHEROSCLEROTIC HEART DISEASE OF NATIVE CORONARY ARTERY WITHOUT ANGINA PECTORIS: ICD-10-CM

## 2018-12-19 PROCEDURE — 93018 CV STRESS TEST I&R ONLY: CPT

## 2018-12-19 PROCEDURE — A9500: CPT

## 2018-12-19 PROCEDURE — 78452 HT MUSCLE IMAGE SPECT MULT: CPT

## 2018-12-19 PROCEDURE — 93016 CV STRESS TEST SUPVJ ONLY: CPT

## 2018-12-19 PROCEDURE — 78452 HT MUSCLE IMAGE SPECT MULT: CPT | Mod: 26

## 2018-12-19 PROCEDURE — 93017 CV STRESS TEST TRACING ONLY: CPT

## 2018-12-26 NOTE — DISCUSSION/SUMMARY
[FreeTextEntry1] : Patient will need ischemic evaluation with imaging prior to possible renal transplant.\par As patient ambulates with a walker at baseline, will pursue pharmacologic nuclear stress testing.\par

## 2018-12-26 NOTE — HISTORY OF PRESENT ILLNESS
[FreeTextEntry1] : Patient is a 78 year-old woman with multiple cardiovascular risk factors including hypertension, insulin dependent diabetes, dyslipidemia, and ESRD on home HD via left upper extremity AV graft (Monday, Tuesday, Thursday, Friday), but no known cardiovascular disease presents today for cardiac evaluation prior to possible renal transplant. She has been on dialysis since November 2017.\par Sometimes her blood pressure drops during HD and when he systolic blood pressure is < 100 mmHg, she has felt dizzy.\par She was evaluate by her cardiologist earlier this year, and had an echocardiogram performed showing normal LV systolic function and normal filling pressures.\par \par She ambulates with a walker because of bilateral knee replacements, one of which was complicated by septic joint requiring wash-out, repeat total knee replacement, and long term antibiotics that worsened her renal disease.\par \par PMD: Jean-Pierre Rodriguez MD [phone]\par Cardiologist: Jay Lisker, MD [phone]\par Nephrologist: Hoa Solis MD [phone]\par Vascular Surgeon: Angelica Russell MD [phone]\par

## 2018-12-26 NOTE — PHYSICAL EXAM
[General Appearance - Well Developed] : well developed [Normal Appearance] : normal appearance [Well Groomed] : well groomed [General Appearance - Well Nourished] : well nourished [No Deformities] : no deformities [General Appearance - In No Acute Distress] : no acute distress [Conjunctiva] : the conjunctiva were normal in both eyes [PERRL] : pupils were equal in size, round, and reactive to light [EOM Intact] : extraocular movements were intact [Normal Oral Mucosa] : normal oral mucosa [No Oral Pallor] : no oral pallor [No Oral Cyanosis] : no oral cyanosis [Normal Oropharynx] : normal oropharynx [] : no respiratory distress [Respiration, Rhythm And Depth] : normal respiratory rhythm and effort [Exaggerated Use Of Accessory Muscles For Inspiration] : no accessory muscle use [Auscultation Breath Sounds / Voice Sounds] : lungs were clear to auscultation bilaterally [5th Left ICS - MCL] : palpated at the 5th LICS in the midclavicular line [Normal] : normal [No Precordial Heave] : no precordial heave was noted [Normal Rate] : normal [Rhythm Regular] : regular [Normal S1] : normal S1 [Normal S2] : normal S2 [No Gallop] : no gallop heard [No Murmur] : no murmurs heard [2+] : right 2+ [0] : left 0 [No Pitting Edema] : no pitting edema present [Bowel Sounds] : normal bowel sounds [Abdomen Soft] : soft [Abdomen Tenderness] : non-tender [Nail Clubbing] : no clubbing of the fingernails [Cyanosis, Localized] : no localized cyanosis [Skin Color & Pigmentation] : normal skin color and pigmentation [No Venous Stasis] : no venous stasis [No Xanthoma] : no  xanthoma was observed [Oriented To Time, Place, And Person] : oriented to person, place, and time [Impaired Insight] : insight and judgment were intact [Affect] : the affect was normal [Mood] : the mood was normal [No Anxiety] : not feeling anxious [Yellow Sclera (Icteric)] : no scleral icterus was seen [Right Carotid Bruit] : no bruit heard over the right carotid [Left Carotid Bruit] : no bruit heard over the left carotid [FreeTextEntry1] : ambulates with walker

## 2018-12-26 NOTE — REASON FOR VISIT
[Initial Evaluation] : an initial evaluation of [Other: _____] : [unfilled] [FreeTextEntry2] : pretransplant cardiac evaluation prior to possible renal transplant

## 2019-01-08 ENCOUNTER — APPOINTMENT (OUTPATIENT)
Dept: NEPHROLOGY | Facility: CLINIC | Age: 79
End: 2019-01-08

## 2019-01-29 ENCOUNTER — RX RENEWAL (OUTPATIENT)
Age: 79
End: 2019-01-29

## 2019-02-07 ENCOUNTER — APPOINTMENT (OUTPATIENT)
Dept: NEPHROLOGY | Facility: CLINIC | Age: 79
End: 2019-02-07

## 2019-02-20 ENCOUNTER — OTHER (OUTPATIENT)
Age: 79
End: 2019-02-20

## 2019-03-07 ENCOUNTER — APPOINTMENT (OUTPATIENT)
Dept: GASTROENTEROLOGY | Facility: CLINIC | Age: 79
End: 2019-03-07

## 2019-03-11 ENCOUNTER — APPOINTMENT (OUTPATIENT)
Dept: OBGYN | Facility: CLINIC | Age: 79
End: 2019-03-11

## 2019-06-06 ENCOUNTER — MEDICATION RENEWAL (OUTPATIENT)
Age: 79
End: 2019-06-06

## 2019-06-26 ENCOUNTER — APPOINTMENT (OUTPATIENT)
Dept: OPHTHALMOLOGY | Facility: CLINIC | Age: 79
End: 2019-06-26
Payer: MEDICARE

## 2019-06-26 ENCOUNTER — NON-APPOINTMENT (OUTPATIENT)
Age: 79
End: 2019-06-26

## 2019-06-26 PROCEDURE — 92133 CPTRZD OPH DX IMG PST SGM ON: CPT

## 2019-06-26 PROCEDURE — 92014 COMPRE OPH EXAM EST PT 1/>: CPT

## 2019-06-26 PROCEDURE — 92225: CPT | Mod: LT

## 2019-06-28 ENCOUNTER — RX RENEWAL (OUTPATIENT)
Age: 79
End: 2019-06-28

## 2019-07-11 ENCOUNTER — APPOINTMENT (OUTPATIENT)
Dept: NEPHROLOGY | Facility: CLINIC | Age: 79
End: 2019-07-11

## 2019-07-11 VITALS
BODY MASS INDEX: 39.96 KG/M2 | HEIGHT: 62 IN | DIASTOLIC BLOOD PRESSURE: 59 MMHG | OXYGEN SATURATION: 100 % | SYSTOLIC BLOOD PRESSURE: 104 MMHG | HEART RATE: 73 BPM | WEIGHT: 217.15 LBS

## 2019-07-16 ENCOUNTER — APPOINTMENT (OUTPATIENT)
Dept: TRANSPLANT | Facility: CLINIC | Age: 79
End: 2019-07-16

## 2019-07-16 ENCOUNTER — APPOINTMENT (OUTPATIENT)
Dept: NEPHROLOGY | Facility: CLINIC | Age: 79
End: 2019-07-16

## 2019-08-12 ENCOUNTER — MEDICATION RENEWAL (OUTPATIENT)
Age: 79
End: 2019-08-12

## 2019-08-23 ENCOUNTER — MEDICATION RENEWAL (OUTPATIENT)
Age: 79
End: 2019-08-23

## 2019-11-21 ENCOUNTER — RX RENEWAL (OUTPATIENT)
Age: 79
End: 2019-11-21

## 2019-11-26 ENCOUNTER — MEDICATION RENEWAL (OUTPATIENT)
Age: 79
End: 2019-11-26

## 2019-11-26 RX ORDER — RANITIDINE 150 MG/1
150 TABLET ORAL
Qty: 90 | Refills: 3 | Status: DISCONTINUED | COMMUNITY
Start: 2017-12-27 | End: 2019-11-26

## 2019-12-17 ENCOUNTER — OTHER (OUTPATIENT)
Age: 79
End: 2019-12-17

## 2020-02-06 ENCOUNTER — APPOINTMENT (OUTPATIENT)
Dept: NEPHROLOGY | Facility: CLINIC | Age: 80
End: 2020-02-06

## 2020-02-06 VITALS
HEIGHT: 62 IN | SYSTOLIC BLOOD PRESSURE: 104 MMHG | BODY MASS INDEX: 39.96 KG/M2 | DIASTOLIC BLOOD PRESSURE: 60 MMHG | HEART RATE: 70 BPM | WEIGHT: 217.16 LBS

## 2020-03-11 ENCOUNTER — APPOINTMENT (OUTPATIENT)
Dept: OPHTHALMOLOGY | Facility: CLINIC | Age: 80
End: 2020-03-11

## 2020-06-10 ENCOUNTER — OUTPATIENT (OUTPATIENT)
Dept: OUTPATIENT SERVICES | Facility: HOSPITAL | Age: 80
LOS: 1 days | End: 2020-06-10
Payer: MEDICARE

## 2020-06-10 ENCOUNTER — APPOINTMENT (OUTPATIENT)
Dept: OPHTHALMOLOGY | Facility: CLINIC | Age: 80
End: 2020-06-10
Payer: MEDICARE

## 2020-06-10 ENCOUNTER — APPOINTMENT (OUTPATIENT)
Dept: RADIOLOGY | Facility: CLINIC | Age: 80
End: 2020-06-10
Payer: MEDICARE

## 2020-06-10 ENCOUNTER — NON-APPOINTMENT (OUTPATIENT)
Age: 80
End: 2020-06-10

## 2020-06-10 DIAGNOSIS — N18.6 END STAGE RENAL DISEASE: ICD-10-CM

## 2020-06-10 PROCEDURE — 92014 COMPRE OPH EXAM EST PT 1/>: CPT

## 2020-06-10 PROCEDURE — 71046 X-RAY EXAM CHEST 2 VIEWS: CPT | Mod: 26

## 2020-06-10 PROCEDURE — 92133 CPTRZD OPH DX IMG PST SGM ON: CPT

## 2020-06-10 PROCEDURE — 71046 X-RAY EXAM CHEST 2 VIEWS: CPT

## 2020-12-23 ENCOUNTER — APPOINTMENT (OUTPATIENT)
Dept: OPHTHALMOLOGY | Facility: CLINIC | Age: 80
End: 2020-12-23
Payer: MEDICARE

## 2020-12-23 ENCOUNTER — NON-APPOINTMENT (OUTPATIENT)
Age: 80
End: 2020-12-23

## 2020-12-23 PROCEDURE — 92012 INTRM OPH EXAM EST PATIENT: CPT

## 2021-03-16 RX ORDER — SODIUM ZIRCONIUM CYCLOSILICATE 10 G/10G
10 POWDER, FOR SUSPENSION ORAL
Qty: 30 | Refills: 11 | Status: DISCONTINUED | COMMUNITY
Start: 2021-03-13 | End: 2021-03-16

## 2021-12-13 ENCOUNTER — RX RENEWAL (OUTPATIENT)
Age: 81
End: 2021-12-13

## 2022-01-22 ENCOUNTER — RX RENEWAL (OUTPATIENT)
Age: 82
End: 2022-01-22

## 2022-02-14 ENCOUNTER — RX RENEWAL (OUTPATIENT)
Age: 82
End: 2022-02-14

## 2022-04-13 ENCOUNTER — APPOINTMENT (OUTPATIENT)
Dept: RADIOLOGY | Facility: CLINIC | Age: 82
End: 2022-04-13
Payer: MEDICARE

## 2022-04-13 ENCOUNTER — OUTPATIENT (OUTPATIENT)
Dept: OUTPATIENT SERVICES | Facility: HOSPITAL | Age: 82
LOS: 1 days | End: 2022-04-13
Payer: MEDICARE

## 2022-04-13 DIAGNOSIS — N18.6 END STAGE RENAL DISEASE: ICD-10-CM

## 2022-04-13 PROCEDURE — 71046 X-RAY EXAM CHEST 2 VIEWS: CPT | Mod: 26

## 2022-04-13 PROCEDURE — 71046 X-RAY EXAM CHEST 2 VIEWS: CPT

## 2022-06-08 ENCOUNTER — NON-APPOINTMENT (OUTPATIENT)
Age: 82
End: 2022-06-08

## 2022-06-08 ENCOUNTER — APPOINTMENT (OUTPATIENT)
Dept: OPHTHALMOLOGY | Facility: CLINIC | Age: 82
End: 2022-06-08
Payer: MEDICARE

## 2022-06-08 PROCEDURE — 76514 ECHO EXAM OF EYE THICKNESS: CPT

## 2022-06-08 PROCEDURE — 92083 EXTENDED VISUAL FIELD XM: CPT

## 2022-06-08 PROCEDURE — 99214 OFFICE O/P EST MOD 30 MIN: CPT

## 2022-06-08 PROCEDURE — 92133 CPTRZD OPH DX IMG PST SGM ON: CPT

## 2022-06-15 ENCOUNTER — APPOINTMENT (OUTPATIENT)
Dept: OPHTHALMOLOGY | Facility: CLINIC | Age: 82
End: 2022-06-15

## 2022-06-29 ENCOUNTER — APPOINTMENT (OUTPATIENT)
Dept: OPHTHALMOLOGY | Facility: CLINIC | Age: 82
End: 2022-06-29

## 2022-07-19 PROBLEM — H40.003 GLAUCOMA SUSPECT OF BOTH EYES: Status: ACTIVE | Noted: 2017-05-09

## 2022-12-14 ENCOUNTER — OUTPATIENT (OUTPATIENT)
Dept: OUTPATIENT SERVICES | Facility: HOSPITAL | Age: 82
LOS: 1 days | End: 2022-12-14
Payer: MEDICARE

## 2022-12-14 ENCOUNTER — APPOINTMENT (OUTPATIENT)
Dept: RADIOLOGY | Facility: CLINIC | Age: 82
End: 2022-12-14

## 2022-12-14 DIAGNOSIS — N18.6 END STAGE RENAL DISEASE: ICD-10-CM

## 2022-12-14 PROCEDURE — 71046 X-RAY EXAM CHEST 2 VIEWS: CPT

## 2022-12-14 PROCEDURE — 71046 X-RAY EXAM CHEST 2 VIEWS: CPT | Mod: 26

## 2023-01-04 ENCOUNTER — NON-APPOINTMENT (OUTPATIENT)
Age: 83
End: 2023-01-04

## 2023-01-04 ENCOUNTER — APPOINTMENT (OUTPATIENT)
Dept: INTERNAL MEDICINE | Facility: CLINIC | Age: 83
End: 2023-01-04
Payer: MEDICARE

## 2023-01-04 VITALS — SYSTOLIC BLOOD PRESSURE: 132 MMHG | DIASTOLIC BLOOD PRESSURE: 60 MMHG | OXYGEN SATURATION: 98 % | HEART RATE: 83 BPM

## 2023-01-04 DIAGNOSIS — H25.13 AGE-RELATED NUCLEAR CATARACT, BILATERAL: ICD-10-CM

## 2023-01-04 DIAGNOSIS — I65.29 OCCLUSION AND STENOSIS OF UNSPECIFIED CAROTID ARTERY: ICD-10-CM

## 2023-01-04 DIAGNOSIS — Z01.810 ENCOUNTER FOR PREPROCEDURAL CARDIOVASCULAR EXAMINATION: ICD-10-CM

## 2023-01-04 DIAGNOSIS — N17.9 ACUTE KIDNEY FAILURE, UNSPECIFIED: ICD-10-CM

## 2023-01-04 DIAGNOSIS — Z99.2 END STAGE RENAL DISEASE: ICD-10-CM

## 2023-01-04 DIAGNOSIS — Z80.3 FAMILY HISTORY OF MALIGNANT NEOPLASM OF BREAST: ICD-10-CM

## 2023-01-04 DIAGNOSIS — E10.29 TYPE 1 DIABETES MELLITUS WITH OTHER DIABETIC KIDNEY COMPLICATION: ICD-10-CM

## 2023-01-04 DIAGNOSIS — Z01.818 ENCOUNTER FOR OTHER PREPROCEDURAL EXAMINATION: ICD-10-CM

## 2023-01-04 DIAGNOSIS — H18.519 ENDOTHELIAL CORNEAL DYSTROPHY, UNSPECIFIED EYE: ICD-10-CM

## 2023-01-04 DIAGNOSIS — E87.1 HYPO-OSMOLALITY AND HYPONATREMIA: ICD-10-CM

## 2023-01-04 DIAGNOSIS — H35.022: ICD-10-CM

## 2023-01-04 DIAGNOSIS — H25.091 OTHER AGE-RELATED INCIPIENT CATARACT, RIGHT EYE: ICD-10-CM

## 2023-01-04 DIAGNOSIS — E83.39 OTHER DISORDERS OF PHOSPHORUS METABOLISM: ICD-10-CM

## 2023-01-04 DIAGNOSIS — N18.6 END STAGE RENAL DISEASE: ICD-10-CM

## 2023-01-04 DIAGNOSIS — Z76.82 AWAITING ORGAN TRANSPLANT STATUS: ICD-10-CM

## 2023-01-04 DIAGNOSIS — N18.30 ACUTE KIDNEY FAILURE, UNSPECIFIED: ICD-10-CM

## 2023-01-04 DIAGNOSIS — I10 ESSENTIAL (PRIMARY) HYPERTENSION: ICD-10-CM

## 2023-01-04 DIAGNOSIS — Z00.00 ENCOUNTER FOR GENERAL ADULT MEDICAL EXAMINATION W/OUT ABNORMAL FINDINGS: ICD-10-CM

## 2023-01-04 PROCEDURE — 99204 OFFICE O/P NEW MOD 45 MIN: CPT | Mod: 25

## 2023-01-04 PROCEDURE — G0439: CPT

## 2023-01-04 PROCEDURE — 93000 ELECTROCARDIOGRAM COMPLETE: CPT

## 2023-01-04 RX ORDER — TIMOLOL MALEATE 2.5 MG/ML
0.25 SOLUTION/ DROPS OPHTHALMIC DAILY
Qty: 1 | Refills: 5 | Status: DISCONTINUED | COMMUNITY
Start: 2018-01-11 | End: 2023-01-04

## 2023-01-04 RX ORDER — OFLOXACIN 3 MG/ML
0.3 SOLUTION/ DROPS OPHTHALMIC 4 TIMES DAILY
Qty: 1 | Refills: 5 | Status: DISCONTINUED | COMMUNITY
Start: 2017-10-31 | End: 2023-01-04

## 2023-01-04 RX ORDER — PREDNISOLONE ACETATE 10 MG/ML
1 SUSPENSION/ DROPS OPHTHALMIC
Qty: 1 | Refills: 3 | Status: DISCONTINUED | COMMUNITY
Start: 2017-10-31 | End: 2023-01-04

## 2023-01-04 RX ORDER — TROPICAMIDE 10 MG/ML
1 SOLUTION/ DROPS OPHTHALMIC
Qty: 1 | Refills: 0 | Status: DISCONTINUED | COMMUNITY
Start: 2017-10-31 | End: 2023-01-04

## 2023-01-04 RX ORDER — CLINDAMYCIN HYDROCHLORIDE 300 MG/1
300 CAPSULE ORAL
Qty: 2 | Refills: 5 | Status: DISCONTINUED | COMMUNITY
Start: 2019-06-06 | End: 2023-01-04

## 2023-01-04 RX ORDER — ASCORBIC ACID, THIAMINE MONONITRATE,RIBOFLAVIN, NIACINAMIDE, PYRIDOXINE HYDROCHLORIDE, FOLIC ACID, CYANOCOBALAMIN, BIOTIN, CALCIUM PANTOTHENATE, 100; 1.5; 1.7; 20; 10; 1; 6000; 150000; 5 MG/1; MG/1; MG/1; MG/1; MG/1; MG/1; UG/1; UG/1; MG/1
1 CAPSULE, LIQUID FILLED ORAL
Qty: 90 | Refills: 3 | Status: DISCONTINUED | COMMUNITY
Start: 2020-11-18 | End: 2023-01-04

## 2023-01-04 RX ORDER — KETOROLAC TROMETHAMINE 4 MG/ML
0.4 SOLUTION/ DROPS OPHTHALMIC
Qty: 1 | Refills: 1 | Status: DISCONTINUED | COMMUNITY
Start: 2017-10-31 | End: 2023-01-04

## 2023-01-04 NOTE — PLAN
[FreeTextEntry1] : pt will release vaccination rec from dialysis center.  pt to release colonosocpy report\par pt drawing labs  in 1 wk for dialysis\par EKG- NSR 75 w rate variation- no chg c/o 12/12/18 EKG

## 2023-01-04 NOTE — PHYSICAL EXAM
[No Acute Distress] : no acute distress [Well Nourished] : well nourished [Well Developed] : well developed [Well-Appearing] : well-appearing [Normal Sclera/Conjunctiva] : normal sclera/conjunctiva [PERRL] : pupils equal round and reactive to light [Normal Outer Ear/Nose] : the outer ears and nose were normal in appearance [Normal Oropharynx] : the oropharynx was normal [Normal TMs] : both tympanic membranes were normal [No Lymphadenopathy] : no lymphadenopathy [Supple] : supple [Thyroid Normal, No Nodules] : the thyroid was normal and there were no nodules present [No Respiratory Distress] : no respiratory distress  [No Accessory Muscle Use] : no accessory muscle use [Clear to Auscultation] : lungs were clear to auscultation bilaterally [Normal Rate] : normal rate  [Regular Rhythm] : with a regular rhythm [Normal S1, S2] : normal S1 and S2 [No Murmur] : no murmur heard [No Carotid Bruits] : no carotid bruits [Pedal Pulses Present] : the pedal pulses are present [No Edema] : there was no peripheral edema [Soft] : abdomen soft [Non Tender] : non-tender [Non-distended] : non-distended [No Masses] : no abdominal mass palpated [Normal Bowel Sounds] : normal bowel sounds [Normal Supraclavicular Nodes] : no supraclavicular lymphadenopathy [Normal Axillary Nodes] : no axillary lymphadenopathy [Normal Posterior Cervical Nodes] : no posterior cervical lymphadenopathy [Normal Anterior Cervical Nodes] : no anterior cervical lymphadenopathy [Normal Inguinal Nodes] : no inguinal lymphadenopathy [Grossly Normal Strength/Tone] : grossly normal strength/tone [No Focal Deficits] : no focal deficits [Normal Gait] : normal gait [Normal Affect] : the affect was normal [Normal Insight/Judgement] : insight and judgment were intact [de-identified] : scaly rash

## 2023-01-04 NOTE — HEALTH RISK ASSESSMENT
[Good] : ~his/her~  mood as  good [Never] : Never [Never (0 pts)] : Never (0 points) [No] : In the past 12 months have you used drugs other than those required for medical reasons? No [No falls in past year] : Patient reported no falls in the past year [0] : 2) Feeling down, depressed, or hopeless: Not at all (0) [PHQ-2 Negative - No further assessment needed] : PHQ-2 Negative - No further assessment needed [Patient reported mammogram was abnormal] : Patient reported mammogram was abnormal [HIV test declined] : HIV test declined [Hepatitis C test declined] : Hepatitis C test declined [Single] : single [Fully functional (bathing, dressing, toileting, transferring, walking, feeding)] : Fully functional (bathing, dressing, toileting, transferring, walking, feeding) [Independent] : managing finances [Some assistance needed] : managing medications [Full assistance needed] : using transportation [Smoke Detector] : smoke detector [Carbon Monoxide Detector] : carbon monoxide detector [Seat Belt] :  uses seat belt [With Patient/Caregiver] : , with patient/caregiver [Designated Healthcare Proxy] : Designated healthcare proxy [Name: ___] : Health Care Proxy's Name: [unfilled]  [Relationship: ___] : Relationship: [unfilled] [Audit-CScore] : 0 [[id number]] : 0 [Change in mental status noted] : No change in mental status noted [MammogramDate] : 10/22 [MammogramComments] : doing it every 6 mo- being f/u by GYN- Dr. Kaiden Mascorro [ColonoscopyComments] : pt to release copy of colonosocpy [de-identified] : lives with Meditation group [FreeTextEntry8] : w walker [FreeTextEntry7] : med is filled [de-identified] : reviewed 6/8/22 Ophth notes- non prolif diabetic retinopathy [de-identified] : seen dentist in 2 mo [AdvancecareDate] : 01/23

## 2023-01-04 NOTE — HISTORY OF PRESENT ILLNESS
[FreeTextEntry1] : CPE [de-identified] : vaccine- flu vac, COVID- 10/2022\par diet- healthy diet\par exercise- stationary bike\par DM- last A1c 6.9, .  using sugar\par HTN- not on any med since being on dialysis\par chol- compliant w diet.\par ESRD on dialysis- reviewed 7/18/18 Nephrology notes, 2/20/19 Surg notes- for Renal transplant evaluation\par reviewed 4/25/18 Cardio notes- and 2/19/18 Nuclear stress test- no ischemia. \par

## 2023-01-30 ENCOUNTER — NON-APPOINTMENT (OUTPATIENT)
Age: 83
End: 2023-01-30

## 2023-02-01 ENCOUNTER — NON-APPOINTMENT (OUTPATIENT)
Age: 83
End: 2023-02-01

## 2023-02-24 ENCOUNTER — RX RENEWAL (OUTPATIENT)
Age: 83
End: 2023-02-24

## 2023-02-24 DIAGNOSIS — E87.5 HYPERKALEMIA: ICD-10-CM

## 2023-02-26 ENCOUNTER — RX RENEWAL (OUTPATIENT)
Age: 83
End: 2023-02-26

## 2023-04-26 ENCOUNTER — RX RENEWAL (OUTPATIENT)
Age: 83
End: 2023-04-26

## 2023-05-03 ENCOUNTER — APPOINTMENT (OUTPATIENT)
Dept: INTERNAL MEDICINE | Facility: CLINIC | Age: 83
End: 2023-05-03
Payer: MEDICARE

## 2023-05-03 VITALS
HEART RATE: 79 BPM | WEIGHT: 222.66 LBS | DIASTOLIC BLOOD PRESSURE: 50 MMHG | SYSTOLIC BLOOD PRESSURE: 132 MMHG | BODY MASS INDEX: 40.73 KG/M2 | OXYGEN SATURATION: 96 %

## 2023-05-03 DIAGNOSIS — Z13.21 ENCOUNTER FOR SCREENING FOR NUTRITIONAL DISORDER: ICD-10-CM

## 2023-05-03 DIAGNOSIS — E55.9 VITAMIN D DEFICIENCY, UNSPECIFIED: ICD-10-CM

## 2023-05-03 DIAGNOSIS — M79.89 OTHER SPECIFIED SOFT TISSUE DISORDERS: ICD-10-CM

## 2023-05-03 DIAGNOSIS — B35.3 TINEA PEDIS: ICD-10-CM

## 2023-05-03 PROCEDURE — 99214 OFFICE O/P EST MOD 30 MIN: CPT

## 2023-05-03 NOTE — HISTORY OF PRESENT ILLNESS
[FreeTextEntry1] : DM [de-identified] : vaccine- rec COVID- booster\par diet- healthy diet\par exercise- stationary bike\par breast CA- f/u w Oncologist\par hypothyroid- compliant w meds\par DM- reviewed 4/14/23 A1c 6.5-, .  using sugar in coffee\par HTN- not on any med since being on dialysis.  reviewed 4/14/23 labs from dialysis.  home -120/50-60\par chol- compliant w diet. taking statin every other day\par ESRD on dialysis- reviewed 7/18/18 Nephrology notes, 2/20/19 Surg notes- for Renal transplant evaluation\par reviewed 4/25/18 Cardio notes- and 2/19/18 Nuclear stress test- no ischemia. \par

## 2023-05-03 NOTE — PHYSICAL EXAM
[Right Foot Was Examined] : Right foot ~C was examined [Left Foot Was Examined] : left foot ~C was examined [None] : no ulcers in either foot were found [] : normal [de-identified] : scaly rash on feet

## 2023-05-16 ENCOUNTER — APPOINTMENT (OUTPATIENT)
Dept: NEPHROLOGY | Facility: CLINIC | Age: 83
End: 2023-05-16

## 2023-05-16 VITALS — DIASTOLIC BLOOD PRESSURE: 60 MMHG | SYSTOLIC BLOOD PRESSURE: 124 MMHG | HEART RATE: 70 BPM | HEIGHT: 62 IN

## 2023-07-19 ENCOUNTER — NON-APPOINTMENT (OUTPATIENT)
Age: 83
End: 2023-07-19

## 2023-07-25 ENCOUNTER — RX RENEWAL (OUTPATIENT)
Age: 83
End: 2023-07-25

## 2023-08-23 ENCOUNTER — APPOINTMENT (OUTPATIENT)
Dept: INTERNAL MEDICINE | Facility: CLINIC | Age: 83
End: 2023-08-23
Payer: MEDICARE

## 2023-08-23 VITALS
WEIGHT: 221.78 LBS | HEART RATE: 93 BPM | BODY MASS INDEX: 40.56 KG/M2 | OXYGEN SATURATION: 96 % | SYSTOLIC BLOOD PRESSURE: 120 MMHG | DIASTOLIC BLOOD PRESSURE: 71 MMHG

## 2023-08-23 DIAGNOSIS — M25.512 PAIN IN LEFT SHOULDER: ICD-10-CM

## 2023-08-23 DIAGNOSIS — Z13.820 ENCOUNTER FOR SCREENING FOR OSTEOPOROSIS: ICD-10-CM

## 2023-08-23 DIAGNOSIS — R01.1 CARDIAC MURMUR, UNSPECIFIED: ICD-10-CM

## 2023-08-23 DIAGNOSIS — M25.512 PAIN IN RIGHT SHOULDER: ICD-10-CM

## 2023-08-23 DIAGNOSIS — E03.9 HYPOTHYROIDISM, UNSPECIFIED: ICD-10-CM

## 2023-08-23 DIAGNOSIS — M25.511 PAIN IN RIGHT SHOULDER: ICD-10-CM

## 2023-08-23 PROCEDURE — 99214 OFFICE O/P EST MOD 30 MIN: CPT

## 2023-08-23 NOTE — PLAN
[FreeTextEntry1] : shoulder pain- trial of heat.  pt leaving for [place] in 2 wk and will return in December so will do PT in [place].

## 2023-08-23 NOTE — HISTORY OF PRESENT ILLNESS
[FreeTextEntry1] : DM [de-identified] : c/o b/l shoulder pain- chronic but worse over past few mo but has good ROM . pt thinks it is due to laying down 4 hr for dialysis.  uses a walker at home exercise- stationary bike breast CA- f/u w Oncologist- has f/u- did mammo in April hypothyroid- compliant w meds DM-states had labs 8/3/23-reviewed on pt's phone - A1c 6.8.  I didn't see BUN/ Cr.  pt will send me the results, .  decr sugar in coffee HTN- not on any med since being on dialysis.  reviewed 4/14/23 labs from dialysis.  home -120/50-60 chol- compliant w diet. taking statin every other day ESRD on dialysis- reviewed 7/18/18 Nephrology notes, 2/20/19 Surg notes- for Renal transplant evaluation reviewed 4/25/18 Cardio notes- and 2/19/18 Nuclear stress test- no ischemia.

## 2023-08-23 NOTE — PHYSICAL EXAM
[Right Foot Was Examined] : Right foot ~C was examined [Left Foot Was Examined] : left foot ~C was examined [None] : no ulcers in either foot were found [] : normal [de-identified] : good ROM of b/l shoulder.  c/o tenderness over trapezius, upper scapula, upper arm [TextEntry] : Constitutional: no acute distress, well nourished, well developed and well-appearing. Pulmonary: no respiratory distress, lungs were clear to auscultation bilaterally, no accessory muscle use. Cardiac: normal rate, with a regular rhythm, normal S1 and S2 and no murmur heard.  Vascular: there was no peripheral edema. Abdomen: abdomen soft, non-tender, non-distended, no abdominal mass palpated and normal bowel sounds. Psychiatric: the affect was normal and insight and judgement were intact

## 2023-09-13 ENCOUNTER — APPOINTMENT (OUTPATIENT)
Dept: OPHTHALMOLOGY | Facility: CLINIC | Age: 83
End: 2023-09-13
Payer: MEDICARE

## 2023-09-13 ENCOUNTER — NON-APPOINTMENT (OUTPATIENT)
Age: 83
End: 2023-09-13

## 2023-09-13 PROCEDURE — 92014 COMPRE OPH EXAM EST PT 1/>: CPT

## 2023-09-13 PROCEDURE — 92015 DETERMINE REFRACTIVE STATE: CPT

## 2023-09-13 PROCEDURE — 92133 CPTRZD OPH DX IMG PST SGM ON: CPT

## 2023-09-13 RX ORDER — LIDOCAINE AND PRILOCAINE 25; 25 MG/G; MG/G
2.5-2.5 CREAM TOPICAL
Qty: 3 | Refills: 11 | Status: ACTIVE | COMMUNITY
Start: 2017-10-31 | End: 1900-01-01

## 2023-10-19 ENCOUNTER — RX RENEWAL (OUTPATIENT)
Age: 83
End: 2023-10-19

## 2023-10-26 ENCOUNTER — RX RENEWAL (OUTPATIENT)
Age: 83
End: 2023-10-26

## 2023-11-30 ENCOUNTER — NON-APPOINTMENT (OUTPATIENT)
Age: 83
End: 2023-11-30

## 2023-11-30 ENCOUNTER — APPOINTMENT (OUTPATIENT)
Dept: OPHTHALMOLOGY | Facility: CLINIC | Age: 83
End: 2023-11-30
Payer: MEDICARE

## 2023-11-30 PROCEDURE — 92012 INTRM OPH EXAM EST PATIENT: CPT

## 2023-12-06 ENCOUNTER — APPOINTMENT (OUTPATIENT)
Dept: OPHTHALMOLOGY | Facility: CLINIC | Age: 83
End: 2023-12-06

## 2023-12-13 ENCOUNTER — APPOINTMENT (OUTPATIENT)
Dept: INTERNAL MEDICINE | Facility: CLINIC | Age: 83
End: 2023-12-13

## 2023-12-17 ENCOUNTER — INPATIENT (INPATIENT)
Facility: HOSPITAL | Age: 83
LOS: 4 days | Discharge: HOME | DRG: 871 | End: 2023-12-22
Attending: STUDENT IN AN ORGANIZED HEALTH CARE EDUCATION/TRAINING PROGRAM | Admitting: HOSPITALIST
Payer: MEDICARE

## 2023-12-17 VITALS
OXYGEN SATURATION: 100 % | TEMPERATURE: 100 F | SYSTOLIC BLOOD PRESSURE: 147 MMHG | HEART RATE: 81 BPM | RESPIRATION RATE: 18 BRPM | WEIGHT: 221.56 LBS | DIASTOLIC BLOOD PRESSURE: 76 MMHG

## 2023-12-17 DIAGNOSIS — E78.5 HYPERLIPIDEMIA, UNSPECIFIED: ICD-10-CM

## 2023-12-17 DIAGNOSIS — R79.89 OTHER SPECIFIED ABNORMAL FINDINGS OF BLOOD CHEMISTRY: ICD-10-CM

## 2023-12-17 DIAGNOSIS — E03.9 HYPOTHYROIDISM, UNSPECIFIED: ICD-10-CM

## 2023-12-17 DIAGNOSIS — D64.9 ANEMIA, UNSPECIFIED: ICD-10-CM

## 2023-12-17 DIAGNOSIS — R09.02 HYPOXEMIA: ICD-10-CM

## 2023-12-17 DIAGNOSIS — Z90.10 ACQUIRED ABSENCE OF UNSPECIFIED BREAST AND NIPPLE: Chronic | ICD-10-CM

## 2023-12-17 DIAGNOSIS — J18.9 PNEUMONIA, UNSPECIFIED ORGANISM: ICD-10-CM

## 2023-12-17 DIAGNOSIS — N18.6 END STAGE RENAL DISEASE: ICD-10-CM

## 2023-12-17 DIAGNOSIS — E11.9 TYPE 2 DIABETES MELLITUS WITHOUT COMPLICATIONS: ICD-10-CM

## 2023-12-17 DIAGNOSIS — I10 ESSENTIAL (PRIMARY) HYPERTENSION: ICD-10-CM

## 2023-12-17 DIAGNOSIS — Z29.9 ENCOUNTER FOR PROPHYLACTIC MEASURES, UNSPECIFIED: ICD-10-CM

## 2023-12-17 DIAGNOSIS — J96.01 ACUTE RESPIRATORY FAILURE WITH HYPOXIA: ICD-10-CM

## 2023-12-17 DIAGNOSIS — Z98.890 OTHER SPECIFIED POSTPROCEDURAL STATES: Chronic | ICD-10-CM

## 2023-12-17 LAB
APTT BLD: 29.2 SEC — SIGNIFICANT CHANGE UP (ref 24.5–35.6)
APTT BLD: 29.2 SEC — SIGNIFICANT CHANGE UP (ref 24.5–35.6)
BASOPHILS # BLD AUTO: 0.11 K/UL — SIGNIFICANT CHANGE UP (ref 0–0.2)
BASOPHILS # BLD AUTO: 0.11 K/UL — SIGNIFICANT CHANGE UP (ref 0–0.2)
BASOPHILS NFR BLD AUTO: 0.9 % — SIGNIFICANT CHANGE UP (ref 0–2)
BASOPHILS NFR BLD AUTO: 0.9 % — SIGNIFICANT CHANGE UP (ref 0–2)
EOSINOPHIL # BLD AUTO: 0.02 K/UL — SIGNIFICANT CHANGE UP (ref 0–0.5)
EOSINOPHIL # BLD AUTO: 0.02 K/UL — SIGNIFICANT CHANGE UP (ref 0–0.5)
EOSINOPHIL NFR BLD AUTO: 0.2 % — SIGNIFICANT CHANGE UP (ref 0–6)
EOSINOPHIL NFR BLD AUTO: 0.2 % — SIGNIFICANT CHANGE UP (ref 0–6)
FLUAV AG NPH QL: SIGNIFICANT CHANGE UP
FLUAV AG NPH QL: SIGNIFICANT CHANGE UP
FLUBV AG NPH QL: SIGNIFICANT CHANGE UP
FLUBV AG NPH QL: SIGNIFICANT CHANGE UP
GAS PNL BLDA: SIGNIFICANT CHANGE UP
GAS PNL BLDA: SIGNIFICANT CHANGE UP
GLUCOSE BLDC GLUCOMTR-MCNC: 239 MG/DL — HIGH (ref 70–99)
GLUCOSE BLDC GLUCOMTR-MCNC: 239 MG/DL — HIGH (ref 70–99)
HCT VFR BLD CALC: 31.3 % — LOW (ref 34.5–45)
HCT VFR BLD CALC: 31.3 % — LOW (ref 34.5–45)
HGB BLD-MCNC: 10 G/DL — LOW (ref 11.5–15.5)
HGB BLD-MCNC: 10 G/DL — LOW (ref 11.5–15.5)
IMM GRANULOCYTES NFR BLD AUTO: 0.9 % — SIGNIFICANT CHANGE UP (ref 0–0.9)
IMM GRANULOCYTES NFR BLD AUTO: 0.9 % — SIGNIFICANT CHANGE UP (ref 0–0.9)
INR BLD: 1.2 RATIO — HIGH (ref 0.85–1.18)
INR BLD: 1.2 RATIO — HIGH (ref 0.85–1.18)
LYMPHOCYTES # BLD AUTO: 1.23 K/UL — SIGNIFICANT CHANGE UP (ref 1–3.3)
LYMPHOCYTES # BLD AUTO: 1.23 K/UL — SIGNIFICANT CHANGE UP (ref 1–3.3)
LYMPHOCYTES # BLD AUTO: 10.3 % — LOW (ref 13–44)
LYMPHOCYTES # BLD AUTO: 10.3 % — LOW (ref 13–44)
MAGNESIUM SERPL-MCNC: 2 MG/DL — SIGNIFICANT CHANGE UP (ref 1.6–2.6)
MAGNESIUM SERPL-MCNC: 2 MG/DL — SIGNIFICANT CHANGE UP (ref 1.6–2.6)
MCHC RBC-ENTMCNC: 31.9 GM/DL — LOW (ref 32–36)
MCHC RBC-ENTMCNC: 31.9 GM/DL — LOW (ref 32–36)
MCHC RBC-ENTMCNC: 33.4 PG — SIGNIFICANT CHANGE UP (ref 27–34)
MCHC RBC-ENTMCNC: 33.4 PG — SIGNIFICANT CHANGE UP (ref 27–34)
MCV RBC AUTO: 104.7 FL — HIGH (ref 80–100)
MCV RBC AUTO: 104.7 FL — HIGH (ref 80–100)
MONOCYTES # BLD AUTO: 1.03 K/UL — HIGH (ref 0–0.9)
MONOCYTES # BLD AUTO: 1.03 K/UL — HIGH (ref 0–0.9)
MONOCYTES NFR BLD AUTO: 8.7 % — SIGNIFICANT CHANGE UP (ref 2–14)
MONOCYTES NFR BLD AUTO: 8.7 % — SIGNIFICANT CHANGE UP (ref 2–14)
NEUTROPHILS # BLD AUTO: 9.4 K/UL — HIGH (ref 1.8–7.4)
NEUTROPHILS # BLD AUTO: 9.4 K/UL — HIGH (ref 1.8–7.4)
NEUTROPHILS NFR BLD AUTO: 79 % — HIGH (ref 43–77)
NEUTROPHILS NFR BLD AUTO: 79 % — HIGH (ref 43–77)
NRBC # BLD: 0 /100 WBCS — SIGNIFICANT CHANGE UP (ref 0–0)
NRBC # BLD: 0 /100 WBCS — SIGNIFICANT CHANGE UP (ref 0–0)
NT-PROBNP SERPL-SCNC: HIGH PG/ML (ref 0–300)
NT-PROBNP SERPL-SCNC: HIGH PG/ML (ref 0–300)
PLATELET # BLD AUTO: 190 K/UL — SIGNIFICANT CHANGE UP (ref 150–400)
PLATELET # BLD AUTO: 190 K/UL — SIGNIFICANT CHANGE UP (ref 150–400)
PROCALCITONIN SERPL-MCNC: 0.42 NG/ML — HIGH (ref 0.02–0.1)
PROCALCITONIN SERPL-MCNC: 0.42 NG/ML — HIGH (ref 0.02–0.1)
PROTHROM AB SERPL-ACNC: 13.1 SEC — HIGH (ref 9.5–13)
PROTHROM AB SERPL-ACNC: 13.1 SEC — HIGH (ref 9.5–13)
RBC # BLD: 2.99 M/UL — LOW (ref 3.8–5.2)
RBC # BLD: 2.99 M/UL — LOW (ref 3.8–5.2)
RBC # FLD: 13.6 % — SIGNIFICANT CHANGE UP (ref 10.3–14.5)
RBC # FLD: 13.6 % — SIGNIFICANT CHANGE UP (ref 10.3–14.5)
RSV RNA NPH QL NAA+NON-PROBE: DETECTED
RSV RNA NPH QL NAA+NON-PROBE: DETECTED
SARS-COV-2 RNA SPEC QL NAA+PROBE: SIGNIFICANT CHANGE UP
SARS-COV-2 RNA SPEC QL NAA+PROBE: SIGNIFICANT CHANGE UP
TROPONIN T, HIGH SENSITIVITY RESULT: 313 NG/L — HIGH (ref 0–51)
TROPONIN T, HIGH SENSITIVITY RESULT: 313 NG/L — HIGH (ref 0–51)
TROPONIN T, HIGH SENSITIVITY RESULT: 317 NG/L — HIGH (ref 0–51)
TROPONIN T, HIGH SENSITIVITY RESULT: 317 NG/L — HIGH (ref 0–51)
WBC # BLD: 11.9 K/UL — HIGH (ref 3.8–10.5)
WBC # BLD: 11.9 K/UL — HIGH (ref 3.8–10.5)
WBC # FLD AUTO: 11.9 K/UL — HIGH (ref 3.8–10.5)
WBC # FLD AUTO: 11.9 K/UL — HIGH (ref 3.8–10.5)

## 2023-12-17 PROCEDURE — 99223 1ST HOSP IP/OBS HIGH 75: CPT | Mod: GC

## 2023-12-17 PROCEDURE — 99285 EMERGENCY DEPT VISIT HI MDM: CPT

## 2023-12-17 PROCEDURE — 71045 X-RAY EXAM CHEST 1 VIEW: CPT | Mod: 26

## 2023-12-17 PROCEDURE — 99222 1ST HOSP IP/OBS MODERATE 55: CPT | Mod: GC

## 2023-12-17 RX ORDER — HEPARIN SODIUM 5000 [USP'U]/ML
5000 INJECTION INTRAVENOUS; SUBCUTANEOUS EVERY 12 HOURS
Refills: 0 | Status: DISCONTINUED | OUTPATIENT
Start: 2023-12-17 | End: 2023-12-22

## 2023-12-17 RX ORDER — FAMOTIDINE 10 MG/ML
1 INJECTION INTRAVENOUS
Refills: 0 | DISCHARGE

## 2023-12-17 RX ORDER — SODIUM CHLORIDE 9 MG/ML
4 INJECTION INTRAMUSCULAR; INTRAVENOUS; SUBCUTANEOUS EVERY 12 HOURS
Refills: 0 | Status: DISCONTINUED | OUTPATIENT
Start: 2023-12-17 | End: 2023-12-18

## 2023-12-17 RX ORDER — INSULIN GLARGINE 100 [IU]/ML
12 INJECTION, SOLUTION SUBCUTANEOUS AT BEDTIME
Refills: 0 | Status: DISCONTINUED | OUTPATIENT
Start: 2023-12-17 | End: 2023-12-21

## 2023-12-17 RX ORDER — IPRATROPIUM/ALBUTEROL SULFATE 18-103MCG
3 AEROSOL WITH ADAPTER (GRAM) INHALATION ONCE
Refills: 0 | Status: COMPLETED | OUTPATIENT
Start: 2023-12-17 | End: 2023-12-17

## 2023-12-17 RX ORDER — GLUCAGON INJECTION, SOLUTION 0.5 MG/.1ML
1 INJECTION, SOLUTION SUBCUTANEOUS ONCE
Refills: 0 | Status: DISCONTINUED | OUTPATIENT
Start: 2023-12-17 | End: 2023-12-22

## 2023-12-17 RX ORDER — LEVOTHYROXINE SODIUM 125 MCG
1 TABLET ORAL
Refills: 0 | DISCHARGE

## 2023-12-17 RX ORDER — CEFTRIAXONE 500 MG/1
1000 INJECTION, POWDER, FOR SOLUTION INTRAMUSCULAR; INTRAVENOUS ONCE
Refills: 0 | Status: COMPLETED | OUTPATIENT
Start: 2023-12-17 | End: 2023-12-17

## 2023-12-17 RX ORDER — INSULIN GLARGINE 100 [IU]/ML
16 INJECTION, SOLUTION SUBCUTANEOUS
Refills: 0 | DISCHARGE

## 2023-12-17 RX ORDER — INSULIN LISPRO 100/ML
VIAL (ML) SUBCUTANEOUS AT BEDTIME
Refills: 0 | Status: DISCONTINUED | OUTPATIENT
Start: 2023-12-17 | End: 2023-12-18

## 2023-12-17 RX ORDER — IPRATROPIUM/ALBUTEROL SULFATE 18-103MCG
3 AEROSOL WITH ADAPTER (GRAM) INHALATION EVERY 6 HOURS
Refills: 0 | Status: DISCONTINUED | OUTPATIENT
Start: 2023-12-17 | End: 2023-12-22

## 2023-12-17 RX ORDER — ATORVASTATIN CALCIUM 80 MG/1
1 TABLET, FILM COATED ORAL
Refills: 0 | DISCHARGE

## 2023-12-17 RX ORDER — FAMOTIDINE 10 MG/ML
20 INJECTION INTRAVENOUS DAILY
Refills: 0 | Status: DISCONTINUED | OUTPATIENT
Start: 2023-12-17 | End: 2023-12-22

## 2023-12-17 RX ORDER — ATORVASTATIN CALCIUM 80 MG/1
20 TABLET, FILM COATED ORAL AT BEDTIME
Refills: 0 | Status: DISCONTINUED | OUTPATIENT
Start: 2023-12-17 | End: 2023-12-22

## 2023-12-17 RX ORDER — SODIUM CHLORIDE 9 MG/ML
1000 INJECTION, SOLUTION INTRAVENOUS
Refills: 0 | Status: DISCONTINUED | OUTPATIENT
Start: 2023-12-17 | End: 2023-12-22

## 2023-12-17 RX ORDER — AZITHROMYCIN 500 MG/1
500 TABLET, FILM COATED ORAL ONCE
Refills: 0 | Status: COMPLETED | OUTPATIENT
Start: 2023-12-17 | End: 2023-12-17

## 2023-12-17 RX ORDER — LEVOTHYROXINE SODIUM 125 MCG
75 TABLET ORAL DAILY
Refills: 0 | Status: DISCONTINUED | OUTPATIENT
Start: 2023-12-17 | End: 2023-12-22

## 2023-12-17 RX ORDER — DEXTROSE 50 % IN WATER 50 %
25 SYRINGE (ML) INTRAVENOUS ONCE
Refills: 0 | Status: DISCONTINUED | OUTPATIENT
Start: 2023-12-17 | End: 2023-12-22

## 2023-12-17 RX ORDER — AZITHROMYCIN 500 MG/1
500 TABLET, FILM COATED ORAL EVERY 24 HOURS
Refills: 0 | Status: COMPLETED | OUTPATIENT
Start: 2023-12-17 | End: 2023-12-21

## 2023-12-17 RX ORDER — INSULIN LISPRO 100/ML
VIAL (ML) SUBCUTANEOUS
Refills: 0 | Status: DISCONTINUED | OUTPATIENT
Start: 2023-12-17 | End: 2023-12-18

## 2023-12-17 RX ORDER — CEFTRIAXONE 500 MG/1
1000 INJECTION, POWDER, FOR SOLUTION INTRAMUSCULAR; INTRAVENOUS EVERY 24 HOURS
Refills: 0 | Status: COMPLETED | OUTPATIENT
Start: 2023-12-17 | End: 2023-12-21

## 2023-12-17 RX ORDER — SEVELAMER CARBONATE 2400 MG/1
1 POWDER, FOR SUSPENSION ORAL
Refills: 0 | DISCHARGE

## 2023-12-17 RX ORDER — FAMOTIDINE 20 MG/1
20 TABLET, FILM COATED ORAL
Qty: 90 | Refills: 3 | Status: ACTIVE | COMMUNITY
Start: 2019-11-26 | End: 1900-01-01

## 2023-12-17 RX ADMIN — AZITHROMYCIN 255 MILLIGRAM(S): 500 TABLET, FILM COATED ORAL at 15:54

## 2023-12-17 RX ADMIN — Medication 40 MILLIGRAM(S): at 15:07

## 2023-12-17 RX ADMIN — Medication 3 MILLILITER(S): at 15:08

## 2023-12-17 RX ADMIN — INSULIN GLARGINE 12 UNIT(S): 100 INJECTION, SOLUTION SUBCUTANEOUS at 22:17

## 2023-12-17 RX ADMIN — CEFTRIAXONE 100 MILLIGRAM(S): 500 INJECTION, POWDER, FOR SOLUTION INTRAMUSCULAR; INTRAVENOUS at 15:08

## 2023-12-17 RX ADMIN — Medication 3 MILLILITER(S): at 15:54

## 2023-12-17 NOTE — H&P ADULT - NSHPPHYSICALEXAM_GEN_ALL_CORE
LOS:     VITALS:   T(C): 37.1 (12-17-23 @ 19:14), Max: 37.9 (12-17-23 @ 14:01)  HR: 82 (12-17-23 @ 19:14) (78 - 97)  BP: 128/55 (12-17-23 @ 19:14) (103/45 - 147/76)  RR: 18 (12-17-23 @ 19:14) (18 - 24)  SpO2: 100% (12-17-23 @ 19:14) (92% - 100%)    GENERAL: NAD, lying in bed comfortably  HEAD:  Atraumatic, Normocephalic  EYES: EOMI, PERRLA, conjunctiva and sclera clear  ENT: Moist mucous membranes  NECK: Supple, No JVD  CHEST/LUNG: Clear to auscultation bilaterally; No rales, rhonchi, wheezing, or rubs. Unlabored respirations  HEART: Regular rate and rhythm; No murmurs, rubs, or gallops  ABDOMEN: BSx4; Soft, nontender, nondistended  EXTREMITIES:  2+ Peripheral Pulses, brisk capillary refill. No clubbing, cyanosis, or edema  NERVOUS SYSTEM:  A&Ox3, no focal deficits   SKIN: No rashes or lesions LOS:     VITALS:   T(C): 37.1 (12-17-23 @ 19:14), Max: 37.9 (12-17-23 @ 14:01)  HR: 82 (12-17-23 @ 19:14) (78 - 97)  BP: 128/55 (12-17-23 @ 19:14) (103/45 - 147/76)  RR: 18 (12-17-23 @ 19:14) (18 - 24)  SpO2: 100% (12-17-23 @ 19:14) (92% - 100%)    GENERAL: NAD, lying in bed comfortably   HEAD:  Atraumatic, Normocephalic  EYES: EOMI, PERRLA, conjunctiva and sclera clear  ENT: Moist mucous membranes  NECK: Supple, No JVD  CHEST/LUNG: diffuse expiratory wheezing, diminished breath sounds at bases. Breathing comfortably on BiPAP 10/5, 40%  HEART: Regular rate and rhythm; No murmurs, rubs, or gallops  ABDOMEN: BSx4; Soft, nontender, nondistended  EXTREMITIES:  1+ pitting edema bilaterally   NERVOUS SYSTEM:  A&Ox3, no focal deficits   SKIN: No rashes or lesions

## 2023-12-17 NOTE — H&P ADULT - NSHPSOCIALHISTORY_GEN_ALL_CORE
Lives at home with family. Oscar to complete all ADLs. Does HD at home Mon, tues, thurs, fri via [facility]. Lives at home with family. Able to complete all ADLs. Does HD at home Mon, tues, thurs, fri via [facility]. 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

## 2023-12-17 NOTE — H&P ADULT - PROBLEM SELECTOR PLAN 9
Diet: renal diet  DVT ppx: SQH  Dispo: pending PT eval  - f/u breast CA as outpt as well as f/u aortic calcification on XR nonemergently

## 2023-12-17 NOTE — ED PROVIDER NOTE - OBJECTIVE STATEMENT
82 y/o female PMHx ESRD on HD (Monday, Tuesday, Thursday, Friday), HTN, HLD, DM now presenting to the ED with SOB, cough, and fevers 100 x3 days. Cough is productive with white sputum. Patient feels SOB with speaking. Patient brought in via EMS found to be hypoxic 80s and given duoneb.

## 2023-12-17 NOTE — ED ADULT NURSE REASSESSMENT NOTE - NS ED NURSE REASSESS COMMENT FT1
Pt breathing spontaneous and unlabored on bipap, speaking full sentences, moving all extremities and following commands. Pt has no complaints at this time.

## 2023-12-17 NOTE — CONSULT NOTE ADULT - ASSESSMENT
83 year old woman with history of ESRD (M/Tu/Th/Fr home HD via LUE graft), hypothyroidism, HTN, HLD, T2DM presenting to the hospital with complaint of wet cough and low oxygen saturation. Patient acute hypoxic respiratory failure on admission with increased work of breathing requiring BiPAP. MICU consulted for new BiPAP.    #New BiPAP  - Current settings; 10/5, 40%  - ABG reviewed, adequate oxygenation  - Patient endorses feeling improved on BiPAP with regard to breathing  - Oxygen saturations improved, consistently >95% during exam  - Can do q6hr standing duoneb given diffuse wheeze  - Wean BiPAP as tolerated by patient  - Patient does not require MICU level care at this time    Please reconsult as needed    **Recommendations preliminary until attested to by attending**

## 2023-12-17 NOTE — H&P ADULT - ASSESSMENT
83F ESRD on HD Mon/Tues/Thurs/Fri, HTN, HLD, T2DM, hypothyroidism, presenting with productive cough and SOB for about 1 week, admitted for AHRF requiring BiPAP, likely secondary to volume overload and RSV with possible superimposed PNA.  83F ESRD on HD Mon/Tues/Thurs/Fri, HLD, T2DM, hypothyroidism, presenting with productive cough and SOB for about 1 week, admitted for AHRF requiring BiPAP, likely secondary to RSV with possible superimposed PNA vs. volume overload.

## 2023-12-17 NOTE — ED PROVIDER NOTE - PROGRESS NOTE DETAILS
PARIS Upton: patient tachypneic with accessory muscle use and hypoxic. ordered bipap. will consult micu

## 2023-12-17 NOTE — ED PROVIDER NOTE - CLINICAL SUMMARY MEDICAL DECISION MAKING FREE TEXT BOX
83 y old f with multiple medical issues presented with couth ,fever and hypoxia ,concern for pneumonia vs Covid ,viral illness ,will obtain blood work and culture , chest xray ,antibiotics admission ZR

## 2023-12-17 NOTE — H&P ADULT - NSHPLABSRESULTS_GEN_ALL_CORE
LABS:                        10.0   11.90 )-----------( 190      ( 17 Dec 2023 14:33 )             31.3     12-17    132<L>  |  90<L>  |  34<H>  ----------------------------<  215<H>  5.2   |  28  |  5.69<H>    Ca    10.0      17 Dec 2023 14:33  Mg     2.0     12-17    TPro  6.9  /  Alb  3.8  /  TBili  0.3  /  DBili  x   /  AST  23  /  ALT  17  /  AlkPhos  79  12-17    PT/INR - ( 17 Dec 2023 14:33 )   PT: 13.1 sec;   INR: 1.20 ratio         PTT - ( 17 Dec 2023 14:33 )  PTT:29.2 sec      Urinalysis Basic - ( 17 Dec 2023 14:33 )    Color: x / Appearance: x / SG: x / pH: x  Gluc: 215 mg/dL / Ketone: x  / Bili: x / Urobili: x   Blood: x / Protein: x / Nitrite: x   Leuk Esterase: x / RBC: x / WBC x   Sq Epi: x / Non Sq Epi: x / Bacteria: x EKG personally reviewed sinus tach 104BPM w/ QTc 444ms, incomplete RBBB, nonspecific ST/TW abn EKG personally reviewed sinus tach 104BPM, QTc 444ms, incomplete RBBB (QRS 94ms), nonspecific ST/TW abn

## 2023-12-17 NOTE — CONSULT NOTE ADULT - PROBLEM SELECTOR RECOMMENDATION 2
Hgb at goal for ESRD patient. No epo for now. Monitor Hgb.     If you have any questions, please feel free to contact me  Gildardo Soriano  Nephrology Fellow  [phone]; Prefer [employer] TEAMS  (After 5pm or on weekends please page the on-call fellow). Hgb at goal for ESRD patient. No epo for now. Monitor Hgb.     If you have any questions, please feel free to contact me  Gildardo Soriano  Nephrology Fellow  [phone]; Prefer [employer] TEAMS  (After 5pm or on weekends please page the on-call fellow).

## 2023-12-17 NOTE — ED ADULT NURSE NOTE - NSFALLRISKINTERV_ED_ALL_ED
Assistance OOB with selected safe patient handling equipment if applicable/Assistance with ambulation/Communicate fall risk and risk factors to all staff, patient, and family/Monitor gait and stability/Provide visual cue: yellow wristband, yellow gown, etc/Reinforce activity limits and safety measures with patient and family/Call bell, personal items and telephone in reach/Instruct patient to call for assistance before getting out of bed/chair/stretcher/Non-slip footwear applied when patient is off stretcher/[place] to call system/Physically safe environment - no spills, clutter or unnecessary equipment/Purposeful Proactive Rounding/Room/bathroom lighting operational, light cord in reach Assistance OOB with selected safe patient handling equipment if applicable/Assistance with ambulation/Communicate fall risk and risk factors to all staff, patient, and family/Monitor gait and stability/Provide visual cue: yellow wristband, yellow gown, etc/Reinforce activity limits and safety measures with patient and family/Call bell, personal items and telephone in reach/Instruct patient to call for assistance before getting out of bed/chair/stretcher/Non-slip footwear applied when patient is off stretcher/[place] to call system/Physically safe environment - no spills, clutter or unnecessary equipment/Purposeful Proactive Rounding/Room/bathroom lighting operational, light cord in reach

## 2023-12-17 NOTE — CONSULT NOTE ADULT - PROBLEM SELECTOR RECOMMENDATION 9
Patient follows with Dr. Solis, at Home HD program [place]. Sister cannulates the patient, HD done Mon, Tue,Th, Fri. Last HD was Friday 12/15, usually remove 1-2L, TW is 100.5kg. On Fri, post HD weight 100.2kg. HD consent obtained from sister- she wants to cannulate if possible. Awaiting BMP, but will likely plan for iHD tomorrow 12/18, first shift, 2-3 L UF. Patient does not appear to have SOB from volume overload- CXR reviewed. BNP can be elevated in setting of ESRD. Obtain RVP and COVID panel.    Please obtain a BMP. K on VBG is 5.

## 2023-12-17 NOTE — H&P ADULT - PROBLEM SELECTOR PLAN 1
Likely 2/2 volume overload and +RSV with possible superimposed PNA  Requiring new BiPAP in ED  BNP elevated however more likely falsely elevated iso ESRD, less likely ADHF    - plan for iHD 12/18 with 2-3L UF  - c/w ceftriaxone (12/17-12/21) and azithromycin (12/17-12/19)  - f/u Bcx  - wean oxygen as tolerated Likely 2/2  +RSV with possible superimposed PNA with thick secretions, small component of volume overload  Requiring new BiPAP 10/5 40% FiO2 in ED  BNP elevated however more likely falsely elevated iso ESRD, less likely ADHF    - c/w ceftriaxone (12/17-12/21) and azithromycin (12/17-12/19)  - duonebs q6h  - hypertonic saline nebs for mobilization of secretions   - plan for iHD 12/18 with 2-3L UF  - f/u Bcx and sputum cx  - f/u TTE  - wean oxygen as tolerated SIRS met w/ tachypnea, leukocytosis  iso RSV+, procal+ w/ CXR c/f bacterial PNA     - CAP tx CTX/azithro while f/u Cx and MRSA/legionella/strep swab/urine  - VS q4h, trend WBC/fever curve   - tx of AHRF as below

## 2023-12-17 NOTE — H&P ADULT - PROBLEM SELECTOR PLAN 6
- c/w home synthroid 75 mcg daily Take lantus 16u at lunchtime  - lantus 12u qHS and ISS q6h while NPO   - A1c in AM  - keep -180 while inpatient

## 2023-12-17 NOTE — H&P ADULT - NSHPREVIEWOFSYSTEMS_GEN_ALL_CORE
REVIEW OF SYSTEMS      General:	  No fever, chills, or night sweats    Skin/Breast:  No rash or erythema  	  Ophthalmologic:  No double or blurry vision  	  ENMT:	  No sore throat    Respiratory and Thorax:  No cough, no shortness of breath, no wheezing  	  Cardiovascular:	  No chest pain, no palpitations    Gastrointestinal:	  No N/V/D/C, bloody or black stools    Genitourinary:	  no burning with urination, no increased frequency of urination    Musculoskeletal:	  No joint pain    Neurological:	  No headache REVIEW OF SYSTEMS      General:	  +fevers and chills    Skin/Breast:  No rash or erythema  	  Ophthalmologic:  No double or blurry vision  	  ENMT:	  No sore throat    Respiratory and Thorax:  +cough as in HPI  	  Cardiovascular:	  No chest pain, no palpitations    Gastrointestinal:	  No N/V/D/C, bloody or black stools    Genitourinary:	  no burning with urination, no increased frequency of urination    Musculoskeletal:	  No joint pain    Neurological:	  No headache REVIEW OF SYSTEMS      General:	  +fevers and chills    Skin/Breast:  No rash or erythema  	  Ophthalmologic:  No double or blurry vision  	  ENMT:	  No sore throat    Respiratory and Thorax:  +SOB, MCLEOD, cough as in HPI  	  Cardiovascular:	  No chest pain, no palpitations    Gastrointestinal:	  No N/V/D/C, bloody or black stools    Genitourinary:	  no burning with urination, no increased frequency of urination    Musculoskeletal:	  No joint pain    Neurological:	  No headache

## 2023-12-17 NOTE — H&P ADULT - PROBLEM SELECTOR PLAN 2
Follows with Dr. Solis. Last HD prior to admission was 12/15  Anuric at baseline    - iHD as above  - nephro following; appreciate reccs Follows with Dr. Solis. Last HD prior to admission was 12/15  Anuric at baseline. Appears mildly volume overloaded however less likely contributing to AHRF    - iHD as above  - nephro following; appreciate reccs Likely 2/2  +RSV with possible superimposed PNA with thick secretions, small component of volume overload  Requiring new BiPAP 10/5 40% FiO2 in ED  BNP elevated however more likely falsely elevated iso ESRD, less likely ADHF    - c/w ceftriaxone (12/17-12/21) and azithromycin (12/17-12/19), narrow per Cx, MRSA swab, Ulegionella/strep   - duonebs q6h  - hypertonic saline nebs for mobilization of secretions   - plan for iHD 12/18 with 2-3L UF  - f/u TTE  - current gas on BiPAP, will rpt gas in AM, consider wean BiPAP at that time

## 2023-12-17 NOTE — CONSULT NOTE ADULT - SUBJECTIVE AND OBJECTIVE BOX
HPI: 83 year old woman with history of ESRD (M/Tu/Th/Fr home HD via LUE graft), hypothyroidism, HTN, HLD, T2DM presenting to the hospital with complaint of wet cough and low oxygen saturation. Family at bedside helping provide history as well. Endorsed to have had dry cough for a week that subsequently became productive the last few days. She has had some dyspnea on exertion, mildly worse than her baseline. No orthopnea endorsed. Endorsed by family to have had temperatures of ~100-101F this AM. Also endorsed to have taken tylenol last night. No lower extremity swelling endorsed. Decreased appetite today, but otherwise has been fine with oral intake. Had been seen by primary care doctor earlier this month and family called them on Friday regarding her symptoms. Patient was ordered for CXR which per report provided by family was read as no focal consolidation and she was also ordered for 100mg doxy twice a day, which was started on Friday. No chest pain endorsed, no nausea or vomiting. No diarrhea or constipation. Patient doesn't make urine. Noted by EMS to have saturation of 80% and was given a duoneb treatment.    In the ED, temp of 100.2, otherwise hemodynamically stable. She Was initially on NRB, but was transitioned to BiPAP for increased WOB. Mild leukocytosis, no metabolic panel available for review. ABG with pH 7.33,         PAST MEDICAL & SURGICAL HISTORY:  Renal failure, chronic      Hypertension      ESRD on dialysis      Diabetes              SOCIAL HISTORY:  Smoking: Denied  EtOH Use: Denied    Allergies    penicillin (Unknown)    Intolerances        HOME MEDICATIONS:    REVIEW OF SYSTEMS:  Constitutional/General: ( ) Acute distress   Eyes: ( ) Changes in vision, ( ) double vision, ( ) blurry vision  ENT: ( ) Nasal congestion or rhinorrhea, ( ) changes in hearing, ( ) odynophagia or dysphagia   Skin: ( ) Rashes  Cardiovascular: ( ) Chest pain, ( ) heart palpitations, ( ) orthopnea/PND  Pulmonary: ( ) Shortness of breath, (X) cough, ( ) pleuritic chest pain, ( ) hemoptysis   Gastrointestinal: ( ) Nausea, ( ) vomiting, ( ) diarrhea, ( ) bloating, ( ) constipation, ( ) abdominal pain  Genitourinary: ( ) Dysuria, ( ) frequency, ( ) change in urine odor/appearance   Musculoskeletal: ( ) Changes in strength, ( ) joint tenderness or swelling  Neurologic: ( ) Changes in memory, ( ) headache, ( ) weakness, ( ) paresthesias, ( ) imbalance   Endocrine: ( ) Heat/cold intolerance, ( ) weight change, ( ) excessive sweating, ( ) polydipsia/polyuria  Psychology: ( ) Changes in mood, ( ) anxiety, ( ) depression.  Heme/Lymph: ( ) Easy bruising  Review of systems otherwise negative aside from above and HPI.    OBJECTIVE:  ICU Vital Signs Last 24 Hrs  T(C): 37.3 (17 Dec 2023 14:29), Max: 37.9 (17 Dec 2023 14:01)  T(F): 99.2 (17 Dec 2023 14:29), Max: 100.2 (17 Dec 2023 14:01)  HR: 79 (17 Dec 2023 16:16) (79 - 97)  BP: 105/62 (17 Dec 2023 16:16) (103/70 - 147/76)  BP(mean): 59 (17 Dec 2023 15:00) (59 - 59)  ABP: --  ABP(mean): --  RR: 18 (17 Dec 2023 16:16) (18 - 24)  SpO2: 100% (17 Dec 2023 16:16) (92% - 100%)    O2 Parameters below as of 17 Dec 2023 16:16  Patient On (Oxygen Delivery Method): BiPAP/CPAP              CAPILLARY BLOOD GLUCOSE          PHYSICAL EXAM:  CONSTITUTIONAL: NAD, well-developed, well-groomed  HEENT: Moist oral mucosa, no pharyngeal injection or exudates  RESPIRATORY: Normal respiratory effort, lungs are clear to auscultation bilaterally  CARDIOVASCULAR: Regular rate and rhythm, normal S1 and S2, no murmur/rub/gallop, no lower extremity edema, peripheral pulses are 2+ bilaterally  ABDOMEN: Soft, nondistended, nontender to palpation, normoactive bowel sounds, no rebound/guarding  PSYCH: A+O to person, place, and time  NEUROLOGY: Facial expression symmetric, no gross sensory deficits appreciated, moves all extremities spontaneously  SKIN: No rashes, no palpable lesions    HOSPITAL MEDICATIONS:  MEDICATIONS  (STANDING):    MEDICATIONS  (PRN):      LABS:                        10.0   11.90 )-----------( 190      ( 17 Dec 2023 14:33 )             31.3       Mg     2.0     12-17      PT/INR - ( 17 Dec 2023 14:33 )   PT: 13.1 sec;   INR: 1.20 ratio         PTT - ( 17 Dec 2023 14:33 )  PTT:29.2 sec    Arterial Blood Gas:  12-17 @ 14:39  7.33/58/139/31/100.0/2.9  ABG lactate: --        MICROBIOLOGY:     RADIOLOGY:  [ ] Reviewed and interpreted by me    EKG: HPI: 83 year old woman with history of ESRD (M/Tu/Th/Fr home HD via LUE graft), hypothyroidism, HTN, HLD, T2DM presenting to the hospital with complaint of wet cough and low oxygen saturation. Family at bedside helping provide history as well. Endorsed to have had dry cough for a week that subsequently became productive the last few days. She has had some dyspnea on exertion, mildly worse than her baseline. No orthopnea endorsed. Endorsed by family to have had temperatures of ~100-101F this AM. Also endorsed to have taken tylenol last night. No lower extremity swelling endorsed. Decreased appetite today, but otherwise has been fine with oral intake. Had been seen by primary care doctor earlier this month and family called them on Friday regarding her symptoms. Patient was ordered for CXR which per report provided by family was read as no focal consolidation and she was also ordered for 100mg doxy twice a day, which was started on Friday. No chest pain endorsed, no nausea or vomiting. No diarrhea or constipation. Patient doesn't make urine. Noted by EMS to have saturation of 80% and was given a duoneb treatment.    In the ED, temp of 100.2, otherwise hemodynamically stable. She Was initially on NRB, but was transitioned to BiPAP for increased WOB. Mild leukocytosis, no metabolic panel available for review. ABG with pH 7.33, No hypercapnia present. CXR with right perihilar hazy opacity. Given doses of ceftriaxone, azithro, 40mg methylpred, duoneb x2. MICU consulted for new BiPAP.        PAST MEDICAL & SURGICAL HISTORY:  Renal failure, chronic      Hypertension      ESRD on dialysis      Diabetes          SOCIAL HISTORY:  Smoking: Denied  EtOH Use: Denied    Allergies    penicillin (Unknown)    Intolerances        HOME MEDICATIONS:   - Per family provided list:      - Atorvastatin 20mg in the evening     - Calcitriol 0.5mcg 4x/week     - Famotidine 20mg daily     - Lantus 16U at lunch daily     - Lidocaine; Prilocaine 1 hour prior to dialysis     - Renvela 800mg once a day     - Synthroid 75mcg daily     - Triphrocaps 1mg daily on dialysis days after dialysis     - Veltassa 8.4g "as directed on non-dialysis days and one additional day"    REVIEW OF SYSTEMS:  Constitutional/General: ( ) Acute distress   Eyes: ( ) Changes in vision, ( ) double vision, ( ) blurry vision  ENT: ( ) Nasal congestion or rhinorrhea, ( ) changes in hearing, ( ) odynophagia or dysphagia   Skin: ( ) Rashes  Cardiovascular: ( ) Chest pain, ( ) heart palpitations, ( ) orthopnea/PND  Pulmonary: ( ) Shortness of breath, (X) cough, ( ) pleuritic chest pain, ( ) hemoptysis   Gastrointestinal: ( ) Nausea, ( ) vomiting, ( ) diarrhea, ( ) bloating, ( ) constipation, ( ) abdominal pain  Genitourinary: ( ) Dysuria, ( ) frequency, ( ) change in urine odor/appearance   Musculoskeletal: ( ) Changes in strength, ( ) joint tenderness or swelling  Neurologic: ( ) Changes in memory, ( ) headache, ( ) weakness, ( ) paresthesias, ( ) imbalance   Endocrine: ( ) Heat/cold intolerance, ( ) weight change, ( ) excessive sweating, ( ) polydipsia/polyuria  Psychology: ( ) Changes in mood, ( ) anxiety, ( ) depression.  Heme/Lymph: ( ) Easy bruising  Review of systems otherwise negative aside from above and HPI.    OBJECTIVE:  ICU Vital Signs Last 24 Hrs  T(C): 37.3 (17 Dec 2023 14:29), Max: 37.9 (17 Dec 2023 14:01)  T(F): 99.2 (17 Dec 2023 14:29), Max: 100.2 (17 Dec 2023 14:01)  HR: 79 (17 Dec 2023 16:16) (79 - 97)  BP: 105/62 (17 Dec 2023 16:16) (103/70 - 147/76)  BP(mean): 59 (17 Dec 2023 15:00) (59 - 59)  ABP: --  ABP(mean): --  RR: 18 (17 Dec 2023 16:16) (18 - 24)  SpO2: 100% (17 Dec 2023 16:16) (92% - 100%)    O2 Parameters below as of 17 Dec 2023 16:16  Patient On (Oxygen Delivery Method): BiPAP/CPAP              CAPILLARY BLOOD GLUCOSE          PHYSICAL EXAM:  CONSTITUTIONAL: NAD  HEENT: BiPAP mask in place  RESPIRATORY: Lungs with diffuse wheeze on both inspiration and expiration bilaterally. No appreciated crackles bilaterally  CARDIOVASCULAR: Regular rate and rhythm, normal S1 and S2, no murmurs appreciated  ABDOMEN: Soft, nondistended, nontender to palpation, normoactive bowel sounds, no rebound/guarding  EXTREMITIES: No lower extremity edema, large appearing legs seem more habitus related. Peripheral pulses are palpable bilaterally in upper and lowers. COLTEN has graft present  PSYCH: A+O to person, place, and time  NEUROLOGY: Facial expression symmetric, no gross sensory deficits appreciated, moves all extremities spontaneously  SKIN: No rashes, no palpable lesions    HOSPITAL MEDICATIONS:  MEDICATIONS  (STANDING):    MEDICATIONS  (PRN):      LABS:                        10.0   11.90 )-----------( 190      ( 17 Dec 2023 14:33 )             31.3       Mg     2.0     12-17      PT/INR - ( 17 Dec 2023 14:33 )   PT: 13.1 sec;   INR: 1.20 ratio     PTT - ( 17 Dec 2023 14:33 )  PTT:29.2 sec    Arterial Blood Gas:  12-17 @ 14:39  7.33/58/139/31/100.0/2.9    Troponin T,  serum   313 (12-17 @ 14:33)      RADIOLOGY:  [X] Reviewed and interpreted by me    EKG: Sinus tach, appears non-ischemic

## 2023-12-17 NOTE — H&P ADULT - ATTENDING COMMENTS
83F R breast CA, hypothyroidism, HTN, HLD, IDDM, renal mass (s/p nephrectomy), ESRD (HD via LUE AVF MTTHF, last 12/15), p/w fever, productive cough, SOB/MCLEOD x days after returning from [place], [place]. Also relays constipation x days though passing flatus and otherwise denies complaints.     ED VS c/f RR 18-24, SpO2%  req 5-8L O2 -> BiPAP 10/5 FiO2% 40. WBC 11.9 (neutrophil predominant) w/ procal 0.42, .7 H/H 10/31.3; Na 132, BUN/SCr 34/5.69 (GFR 7);  -> 317, proBNP 64794; ABG 7.33/58/139/31 lact 1.9. RSV+. CXR R perihilar hazy opacity. Received CTX, azithro, solumedrol, duonebs    nephro c/s in ED plan for HD 12/18 w/ 2-3L UF (believes BNP elevated 2/2 ESRD and SOB not 2/2 vol OL). MICU c/s, not requiring ICU and no urgent recs     EKG as above    PHYSICAL EXAM:  GENERAL: Obese-appearing in NAD, well-groomed, well-developed, pleasant to interview, accompanied by sister  HEAD: Atraumatic, Normocephalic  EYES: PERRL, conjunctiva and sclera clear  ENMT: oral MMM otherwise difficult to assess entirety of oropharynx 2/2 NIPPV mask   NECK: Supple, difficult to assess JVP given habitus   NERVOUS SYSTEM: Alert & Oriented X3, Good concentration; Motor Strength 5/5 B/L upper RLE, moving LLE though limited at bl which pt states is since R knee surgery   CHEST/LUNG: +scattered rhonchi, minimal crackles at bases, no wheeze. Breathing comfortably on BiPAP.   HEART: Regular rate and rhythm; No murmurs, rubs, or gallops  ABDOMEN: Soft, Nontender, Nondistended; Bowel sounds present. No guarding, rebound tenderness, or rigidity.  EXTREMITIES: 2+ Peripheral Pulses, No edema. No warmth/erythema to palpation of LE. Reports b/l pain of LE though no focal area of tenderness. LUE AVF in place w/ palpable thrill and bruit auscultated (2 locations)     #sepsis 2/2 RSV with superimposed bacterial PNA c/b AHRF  SIRS met w/ leukocytosis and tachypnea iso RSV+ and procal elevation w/ CXR opacity  fever, dyspnea, productive cough, VBG hypercapnia though oxygenating well, placed on BiPAP 10/5, FiO2% 40 for WOB  MICU c/s, no need for ICU   Wells score low w/ regard to PE risk   - rpt gas in AM, wean BiPAP as tolerates  - c/w CAP tx (CTX, azithro), f/u CX, MRSA, Ustrep/legionella, sputum  - VS q4h, trend fever/wbc curve  - monitor      #ESRD on HD   nephro c/s in ED, plan for HD 12/18 w/ UF via LUE AVF  Na 132, K borderline, proBNP and HST elevated thought contributed to by renal dysfn  - monitor BMP, dose per GFR, avoid nephrotoxins, monitor vol status  - f/u further nephro recs     #troponin elevation   -> 317, presume contributed to by demand of infxn and reduced renal clearance  w/o CP or palpitations   - trend HST, c/s cardiology if significant delta  - monitor sx on tele, EKG stat and rpt CE if symptomatic  - TTE    #macrocytic anemia  unclear bl  - trend CBC daily    #IDDM  home lantus 16u QHS  - c/w reduced lantus dose w/ ISS and monitor FS q6h while NPO on mask     #HTN  BP acceptable in ED, not on home med  - monitor BP     #HLD  - c/w statin    #hypothyroidism  - c/w synthroid     #R breast CA  - outpt f/u    #PPx  - HSQ VTE ppx  - NPO while on NIPPV  - fall/aspiration precaution  - PO bowel regimen once able to take, otherwise suppository/enema prn  - dispo pending course/PT eval  - f/u aortic calcification non-emergently     case and plan d/w pt, sister at bedside, and resident Dr. Harris 83F R breast CA, hypothyroidism, HTN, HLD, IDDM, renal mass (s/p nephrectomy), ESRD (HD via LUE AVF MTTHF, last 12/15), p/w fever, productive cough, SOB/MCLEOD x days after returning from [place], [place]. Also relays constipation x days though passing flatus and otherwise denies complaints.     ED VS c/f RR 18-24, SpO2%  req 5-8L O2 -> BiPAP 10/5 FiO2% 40. WBC 11.9 (neutrophil predominant) w/ procal 0.42, .7 H/H 10/31.3; Na 132, BUN/SCr 34/5.69 (GFR 7);  -> 317, proBNP 94141; ABG 7.33/58/139/31 lact 1.9. RSV+. CXR R perihilar hazy opacity. Received CTX, azithro, solumedrol, duonebs    nephro c/s in ED plan for HD 12/18 w/ 2-3L UF (believes BNP elevated 2/2 ESRD and SOB not 2/2 vol OL). MICU c/s, not requiring ICU and no urgent recs     EKG as above    PHYSICAL EXAM:  GENERAL: Obese-appearing in NAD, well-groomed, well-developed, pleasant to interview, accompanied by sister  HEAD: Atraumatic, Normocephalic  EYES: PERRL, conjunctiva and sclera clear  ENMT: oral MMM otherwise difficult to assess entirety of oropharynx 2/2 NIPPV mask   NECK: Supple, difficult to assess JVP given habitus   NERVOUS SYSTEM: Alert & Oriented X3, Good concentration; Motor Strength 5/5 B/L upper RLE, moving LLE though limited at bl which pt states is since R knee surgery   CHEST/LUNG: +scattered rhonchi, minimal crackles at bases, no wheeze. Breathing comfortably on BiPAP.   HEART: Regular rate and rhythm; No murmurs, rubs, or gallops  ABDOMEN: Soft, Nontender, Nondistended; Bowel sounds present. No guarding, rebound tenderness, or rigidity.  EXTREMITIES: 2+ Peripheral Pulses, No edema. No warmth/erythema to palpation of LE. Reports b/l pain of LE though no focal area of tenderness. LUE AVF in place w/ palpable thrill and bruit auscultated (2 locations)     #sepsis 2/2 RSV with superimposed bacterial PNA c/b AHRF  SIRS met w/ leukocytosis and tachypnea iso RSV+ and procal elevation w/ CXR opacity  fever, dyspnea, productive cough, VBG hypercapnia though oxygenating well, placed on BiPAP 10/5, FiO2% 40 for WOB  MICU c/s, no need for ICU   Wells score low w/ regard to PE risk   - rpt gas in AM, wean BiPAP as tolerates  - c/w CAP tx (CTX, azithro), f/u CX, MRSA, Ustrep/legionella, sputum  - VS q4h, trend fever/wbc curve  - monitor      #ESRD on HD   nephro c/s in ED, plan for HD 12/18 w/ UF via LUE AVF  Na 132, K borderline, proBNP and HST elevated thought contributed to by renal dysfn  - monitor BMP, dose per GFR, avoid nephrotoxins, monitor vol status  - f/u further nephro recs     #troponin elevation   -> 317, presume contributed to by demand of infxn and reduced renal clearance  w/o CP or palpitations   - trend HST, c/s cardiology if significant delta  - monitor sx on tele, EKG stat and rpt CE if symptomatic  - TTE    #macrocytic anemia  unclear bl  - trend CBC daily    #IDDM  home lantus 16u QHS  - c/w reduced lantus dose w/ ISS and monitor FS q6h while NPO on mask     #HTN  BP acceptable in ED, not on home med  - monitor BP     #HLD  - c/w statin    #hypothyroidism  - c/w synthroid     #R breast CA  - outpt f/u    #PPx  - HSQ VTE ppx  - NPO while on NIPPV  - fall/aspiration precaution  - PO bowel regimen once able to take, otherwise suppository/enema prn  - dispo pending course/PT eval  - f/u aortic calcification non-emergently     case and plan d/w pt, sister at bedside, and resident Dr. Harris 83F R breast CA, hypothyroidism, HTN, HLD, IDDM, renal mass (s/p nephrectomy), ESRD (HD via LUE AVF MTTHF, last 12/15), p/w fever, productive cough, SOB/MCLEOD x days after returning from [place], [place]. Also relays constipation x days though passing flatus and otherwise denies complaints.     ED VS c/f RR 18-24, SpO2%  req 5-8L O2 -> BiPAP 10/5 FiO2% 40. WBC 11.9 (neutrophil predominant) w/ procal 0.42, .7 H/H 10/31.3; Na 132, BUN/SCr 34/5.69 (GFR 7);  -> 317, proBNP 92317; ABG 7.33/58/139/31 lact 1.9. RSV+. CXR R perihilar hazy opacity. Received CTX, azithro, solumedrol, duonebs    nephro c/s in ED plan for HD 12/18 w/ 2-3L UF (believes BNP elevated 2/2 ESRD and SOB not 2/2 vol OL). MICU c/s, not requiring ICU and no urgent recs     EKG as above    PHYSICAL EXAM:  GENERAL: Obese-appearing in NAD, well-groomed, well-developed, pleasant to interview, accompanied by sister  HEAD: Atraumatic, Normocephalic  EYES: PERRL, conjunctiva and sclera clear  ENMT: oral MMM otherwise difficult to assess entirety of oropharynx 2/2 NIPPV mask   NECK: Supple, difficult to assess JVP given habitus   NERVOUS SYSTEM: Alert & Oriented X3, Good concentration; Motor Strength 5/5 B/L upper RLE, moving LLE though limited at bl which pt states is since R knee surgery   CHEST/LUNG: +scattered rhonchi, minimal crackles at bases, no wheeze. Breathing comfortably on BiPAP.   HEART: Regular rate and rhythm; No murmurs, rubs, or gallops  ABDOMEN: Soft, Nontender, Nondistended; Bowel sounds present. No guarding, rebound tenderness, or rigidity.  EXTREMITIES: 2+ Peripheral Pulses, No edema. No warmth/erythema to palpation of LE. Reports b/l pain of LE though no focal area of tenderness. LUE AVF in place w/ palpable thrill and bruit auscultated (2 locations)     #sepsis 2/2 RSV with superimposed bacterial PNA c/b AHRF  SIRS met w/ leukocytosis and tachypnea iso RSV+ and procal elevation w/ CXR opacity  fever, dyspnea, productive cough, VBG hypercapnia though oxygenating well, placed on BiPAP 10/5, FiO2% 40 for WOB  MICU c/s, no need for ICU   Wells score low w/ regard to PE risk   - rpt gas in AM, wean BiPAP as tolerates  - c/w CAP tx (CTX, azithro), f/u CX, MRSA, Ustrep/legionella, sputum  - VS q4h, trend fever/wbc curve  - monitor      #ESRD on HD   nephro c/s in ED, plan for HD 12/18 w/ UF via LUE AVF  Na 132, K borderline, proBNP and HST elevated thought contributed to by renal dysfn  - monitor BMP, dose per GFR, avoid nephrotoxins, monitor vol status  - f/u further nephro recs     #troponin elevation   -> 317, presume contributed to by demand of infxn and reduced renal clearance  w/o CP or palpitations   EKG w/o c/f ACS  - trend HST, c/s cardiology if significant delta  - monitor sx on tele, EKG stat and rpt CE if symptomatic  - TTE    #macrocytic anemia  unclear bl  - trend CBC daily    #IDDM  home lantus 16u QHS  - c/w reduced lantus dose w/ ISS and monitor FS q6h while NPO on mask     #HTN  BP acceptable in ED, not on home med  - monitor BP     #HLD  - c/w statin    #hypothyroidism  - c/w synthroid     #R breast CA  - outpt f/u    #PPx  - HSQ VTE ppx  - NPO while on NIPPV  - fall/aspiration precaution  - PO bowel regimen once able to take, otherwise suppository/enema prn  - dispo pending course/PT eval  - f/u aortic calcification non-emergently     case and plan d/w pt, sister at bedside, and resident Dr. Harris 83F R breast CA, hypothyroidism, HTN, HLD, IDDM, renal mass (s/p nephrectomy), ESRD (HD via LUE AVF MTTHF, last 12/15), p/w fever, productive cough, SOB/MCLEOD x days after returning from [place], [place]. Also relays constipation x days though passing flatus and otherwise denies complaints.     ED VS c/f RR 18-24, SpO2%  req 5-8L O2 -> BiPAP 10/5 FiO2% 40. WBC 11.9 (neutrophil predominant) w/ procal 0.42, .7 H/H 10/31.3; Na 132, BUN/SCr 34/5.69 (GFR 7);  -> 317, proBNP 27555; ABG 7.33/58/139/31 lact 1.9. RSV+. CXR R perihilar hazy opacity. Received CTX, azithro, solumedrol, duonebs    nephro c/s in ED plan for HD 12/18 w/ 2-3L UF (believes BNP elevated 2/2 ESRD and SOB not 2/2 vol OL). MICU c/s, not requiring ICU and no urgent recs     EKG as above    PHYSICAL EXAM:  GENERAL: Obese-appearing in NAD, well-groomed, well-developed, pleasant to interview, accompanied by sister  HEAD: Atraumatic, Normocephalic  EYES: PERRL, conjunctiva and sclera clear  ENMT: oral MMM otherwise difficult to assess entirety of oropharynx 2/2 NIPPV mask   NECK: Supple, difficult to assess JVP given habitus   NERVOUS SYSTEM: Alert & Oriented X3, Good concentration; Motor Strength 5/5 B/L upper RLE, moving LLE though limited at bl which pt states is since R knee surgery   CHEST/LUNG: +scattered rhonchi, minimal crackles at bases, no wheeze. Breathing comfortably on BiPAP.   HEART: Regular rate and rhythm; No murmurs, rubs, or gallops  ABDOMEN: Soft, Nontender, Nondistended; Bowel sounds present. No guarding, rebound tenderness, or rigidity.  EXTREMITIES: 2+ Peripheral Pulses, No edema. No warmth/erythema to palpation of LE. Reports b/l pain of LE though no focal area of tenderness. LUE AVF in place w/ palpable thrill and bruit auscultated (2 locations)     #sepsis 2/2 RSV with superimposed bacterial PNA c/b AHRF  SIRS met w/ leukocytosis and tachypnea iso RSV+ and procal elevation w/ CXR opacity  fever, dyspnea, productive cough, VBG hypercapnia though oxygenating well, placed on BiPAP 10/5, FiO2% 40 for WOB  MICU c/s, no need for ICU   Wells score low w/ regard to PE risk   - rpt gas in AM, wean BiPAP as tolerates  - c/w CAP tx (CTX, azithro), f/u CX, MRSA, Ustrep/legionella, sputum  - VS q4h, trend fever/wbc curve  - monitor      #ESRD on HD   nephro c/s in ED, plan for HD 12/18 w/ UF via LUE AVF  Na 132, K borderline, proBNP and HST elevated thought contributed to by renal dysfn  - monitor BMP, dose per GFR, avoid nephrotoxins, monitor vol status  - f/u further nephro recs     #troponin elevation   -> 317, presume contributed to by demand of infxn and reduced renal clearance  w/o CP or palpitations   EKG w/o c/f ACS  - trend HST, c/s cardiology if significant delta  - monitor sx on tele, EKG stat and rpt CE if symptomatic  - TTE    #macrocytic anemia  unclear bl  - trend CBC daily    #IDDM  home lantus 16u QHS  - c/w reduced lantus dose w/ ISS and monitor FS q6h while NPO on mask     #HTN  BP acceptable in ED, not on home med  - monitor BP     #HLD  - c/w statin    #hypothyroidism  - c/w synthroid     #R breast CA  - outpt f/u    #PPx  - HSQ VTE ppx  - NPO while on NIPPV  - fall/aspiration precaution  - PO bowel regimen once able to take, otherwise suppository/enema prn  - dispo pending course/PT eval  - f/u aortic calcification non-emergently     case and plan d/w pt, sister at bedside, and resident Dr. Harris

## 2023-12-17 NOTE — H&P ADULT - NSICDXPASTMEDICALHX_GEN_ALL_CORE_FT
PAST MEDICAL HISTORY:  Diabetes     ESRD on dialysis     Hypertension     Renal failure, chronic      PAST MEDICAL HISTORY:  Breast cancer     Diabetes     ESRD on dialysis     History of hypothyroidism     HLD (hyperlipidemia)     Hypertension     Renal failure, chronic

## 2023-12-17 NOTE — H&P ADULT - HISTORY OF PRESENT ILLNESS
83F ESRD on HD Mon/Tues/Thurs/Fri, HTN, HLD, T2DM, hypothyroidism, presenting with productive cough and SOB for about 1 week. Patient accompanied by family who is assisting in history. Family reports pt had a cough that was initially dry and eventually became productive last few days. She has mild MCLEOD at baseline, but it has worsened during this time. She had fevers at home with Tmax 101 this AM. She was seen by her PCP earlier this month and had CXR which showed no consolidation. Was given doxycycline on Friday with minimal improvement.     On arrival to ED, HDS, Tmax 100.2, SpO2 100% on 8L NRB. She subsequently had worsening WOB, so was transitioned to BiPAP. Initial workup remarkable for ABG 7.33/58/139/1.9 on BiPAP. +RSV. CXR with R perihilar opaciity. Given ceftriaxone, azithromycin, 40 mg solumedrol, and duoneb x 2. Admitted to medicine for further workup.  83F ESRD on HD Mon/Tues/Thurs/Fri, HLD, T2DM, hypothyroidism, presenting with productive cough and SOB for about 1 week. Patient accompanied by family who is assisting in history. Family reports pt had a cough that was initially dry and eventually became productive last few days. She has mild MCLEOD at baseline, but it has worsened during this time. She had fevers at home with Tmax 101 this AM. She was seen by her PCP earlier this month and had CXR which showed no consolidation. Was given doxycycline on Friday with minimal improvement.     On arrival to ED, HDS, Tmax 100.2, SpO2 100% on 8L NRB. She subsequently had worsening WOB, so was transitioned to BiPAP. Initial workup remarkable for ABG 7.33/58/139/1.9 on BiPAP. +RSV. CXR with R perihilar opaciity. Given ceftriaxone, azithromycin, 40 mg solumedrol, and duoneb x 2. Admitted to medicine for further workup.  83F R breast CA (s/p mastectomy), ESRD on HD Mon/Tues/Thurs/Fri, HLD, T2DM, hypothyroidism, presenting with productive cough and SOB for about 1 week. Patient accompanied by family who is assisting in history. Family reports pt had a cough that was initially dry and eventually became productive last few days. She has mild MCLEOD at baseline, but it has worsened during this time. She had fevers at home with Tmax 101 this AM. She was seen by her PCP earlier this month and had CXR which showed no consolidation. Was given doxycycline on Friday with minimal improvement.  Of note recently returned from [place], [place].     On arrival to ED, HDS, Tmax 100.2, SpO2 100% on 8L NRB. She subsequently had worsening WOB, so was transitioned to BiPAP. Initial workup remarkable for ABG 7.33/58/139/1.9 on BiPAP. +RSV. CXR with R perihilar opaciity. Given ceftriaxone, azithromycin, 40 mg solumedrol, and duoneb x 2. Admitted to medicine for further workup.  83F R breast CA (s/p mastectomy), ESRD on HD Mon/Tues/Thurs/Fri, HLD, T2DM, hypothyroidism, presenting with productive cough and SOB for about 1 week. Patient accompanied by family who is assisting in history. Family reports pt had a cough that was initially dry and eventually became productive last few days. She has mild MCLEOD at baseline, but it has worsened during this time. She had fevers at home with Tmax 101 this AM. She was seen by her PCP earlier this month and had CXR which showed no consolidation. Was given doxycycline on Friday with minimal improvement.  Of note recently returned from [place], [place].     On arrival to ED, HDS, Tmax 100.2, SpO2 100% on 8L NRB. She subsequently had worsening WOB, so was transitioned to BiPAP. Initial workup remarkable for ABG 7.33/58/139/1.9 on BiPAP. +RSV. CXR with R perihilar opaciity. Given ceftriaxone, azithromycin, 40 mg solumedrol, and duoneb x 2. Admitted to medicine for further workup.

## 2023-12-17 NOTE — H&P ADULT - PROBLEM SELECTOR PLAN 5
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

## 2023-12-17 NOTE — H&P ADULT - PROBLEM SELECTOR PLAN 3
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 Solis. Last HD prior to admission was 12/15  Anuric at baseline. Appears mildly volume overloaded however less likely contributing to AHRF  Na 132, K borderline  - trend BMP   - iHD as above  - nephro following; appreciate reccs

## 2023-12-17 NOTE — ED ADULT NURSE NOTE - OBJECTIVE STATEMENT
82 yo female with pmh ESRD on HD (M, T,R, F) DM presents to ED by EMS c/o SOB, productive cough, and fevers x 3 days. Pt reports SOB with speaking and movement. Per EMS, pt hypoxic to 80s and given a duoneb. Pt a&ox3, increased WOB noted, winded when speaking full sentences, moving all extremities and following commands, skin warm dry and appropriate color. MD at bedside. Pt placed on CM and continuous pulse ox. Respiratory called for bipap. Pt safety measures in place and comfort provided.

## 2023-12-17 NOTE — CONSULT NOTE ADULT - SUBJECTIVE AND OBJECTIVE BOX
[facility] DIVISION OF KIDNEY DISEASES AND HYPERTENSION -- [phone]  -- INITIAL CONSULT NOTE  --------------------------------------------------------------------------------  HPI:  83 y.o F w/ PMHx of ESRD on HD MTTHF, HTN, HLD, DM here with SOB and cough for 3 days. Fever noted today. Patient follows with Dr. Solis, at [place]. Sister cannulates at home. Last HD was Friday      PAST HISTORY  --------------------------------------------------------------------------------  PAST MEDICAL & SURGICAL HISTORY:  Renal failure, chronic      Hypertension      ESRD on dialysis      Diabetes        FAMILY HISTORY:    PAST SOCIAL HISTORY:    ALLERGIES & MEDICATIONS  --------------------------------------------------------------------------------  Allergies    penicillin (Unknown)    Intolerances      Standing Inpatient Medications  albuterol/ipratropium for Nebulization. 3 milliLiter(s) Nebulizer once  azithromycin  IVPB 500 milliGRAM(s) IV Intermittent once    PRN Inpatient Medications      REVIEW OF SYSTEMS  --------------------------------------------------------------------------------  Gen: No fevers/chills  Head/Eyes/Ears: No HA  Respiratory: No dyspnea, cough  CV: No chest pain  GI: No abdominal pain, diarrhea  : No dysuria, hematuria  MSK: No  edema  Skin: No rashes  Heme: No easy bruising or bleeding    All other systems were reviewed and are negative, except as noted.    VITALS/PHYSICAL EXAM  --------------------------------------------------------------------------------  T(C): 37.3 (12-17-23 @ 14:29), Max: 37.9 (12-17-23 @ 14:01)  HR: 97 (12-17-23 @ 14:35) (80 - 97)  BP: 103/70 (12-17-23 @ 14:29) (103/70 - 147/76)  RR: 24 (12-17-23 @ 14:29) (18 - 24)  SpO2: 100% (12-17-23 @ 14:35) (92% - 100%)  Wt(kg): --    Weight (kg): 100.5 (12-17-23 @ 14:01)        Physical Exam:  	Gen: NAD  	HEENT: Anicteric  	Pulm: CTA B/L  	CV: S1S2+  	Abd: Soft, +BS            Transplant site: RLQ non tender, well healed surgical scar.  	Ext: No LE edema B/L  	Neuro: Awake          : Watters+ with clear urine in the bag  	Skin: Warm and dry  	Dialysis access:     LABS/STUDIES  --------------------------------------------------------------------------------              10.0   11.90 >-----------<  190      [12-17-23 @ 14:33]              31.3           Mg     2.0     [12-17-23 @ 14:33]      PT/INR: PT 13.1 , INR 1.20       [12-17-23 @ 14:33]  PTT: 29.2       [12-17-23 @ 14:33]      Creatinine Trend:            Tacrolimus  Cyclosporine  Sirolimus  Mycophenolate  BK PCR  CMV PCR  Parvo PCR  EBV PCR [facility] DIVISION OF KIDNEY DISEASES AND HYPERTENSION -- [phone]  -- INITIAL CONSULT NOTE  --------------------------------------------------------------------------------  HPI:  83 y.o F w/ PMHx of ESRD on HD MTTHF, HTN, HLD, DM here with SOB and cough for 3 days. Fever noted today. Patient follows with Dr. Solis, at [place]. Sister cannulates at home. Last HD was Friday      PAST HISTORY  --------------------------------------------------------------------------------  PAST MEDICAL & SURGICAL HISTORY:  Renal failure, chronic      Hypertension      ESRD on dialysis      Diabetes        FAMILY HISTORY:    PAST SOCIAL HISTORY:    ALLERGIES & MEDICATIONS  --------------------------------------------------------------------------------  Allergies    penicillin (Unknown)    Intolerances      Standing Inpatient Medications  albuterol/ipratropium for Nebulization. 3 milliLiter(s) Nebulizer once  azithromycin  IVPB 500 milliGRAM(s) IV Intermittent once    PRN Inpatient Medications      REVIEW OF SYSTEMS  --------------------------------------------------------------------------------  Gen: No fevers/chills  Head/Eyes/Ears: No HA  Respiratory: No dyspnea, cough  CV: No chest pain  GI: No abdominal pain, diarrhea  : No dysuria, hematuria  MSK: No  edema  Skin: No rashes  Heme: No easy bruising or bleeding    All other systems were reviewed and are negative, except as noted.    VITALS/PHYSICAL EXAM  --------------------------------------------------------------------------------  T(C): 37.3 (12-17-23 @ 14:29), Max: 37.9 (12-17-23 @ 14:01)  HR: 97 (12-17-23 @ 14:35) (80 - 97)  BP: 103/70 (12-17-23 @ 14:29) (103/70 - 147/76)  RR: 24 (12-17-23 @ 14:29) (18 - 24)  SpO2: 100% (12-17-23 @ 14:35) (92% - 100%)  Wt(kg): --    Weight (kg): 100.5 (12-17-23 @ 14:01)        Physical Exam:  	Gen: NAD  	HEENT: Anicteric  	Pulm: CTA B/L  	CV: S1S2+  	Abd: Soft, +BS            Transplant site: RLQ non tender, well healed surgical scar.  	Ext: No LE edema B/L  	Neuro: Awake          : Watters+ with clear urine in the bag  	Skin: Warm and dry  	Dialysis access:     LABS/STUDIES  --------------------------------------------------------------------------------              10.0   11.90 >-----------<  190      [12-17-23 @ 14:33]              31.3           Mg     2.0     [12-17-23 @ 14:33]      PT/INR: PT 13.1 , INR 1.20       [12-17-23 @ 14:33]  PTT: 29.2       [12-17-23 @ 14:33]      Creatinine Trend:            Tacrolimus  Cyclosporine  Sirolimus  Mycophenolate  BK PCR  CMV PCR  Parvo PCR  EBV PCR [facility] DIVISION OF KIDNEY DISEASES AND HYPERTENSION -- [phone]  -- INITIAL CONSULT NOTE  --------------------------------------------------------------------------------  HPI:  83 y.o F w/ PMHx of ESRD on HD MTTHF, HTN, HLD, DM here with SOB and cough for 3 days. Fever noted today. Patient follows with Dr. Solis, at [place]. Sister cannulates at home. Last HD was Friday 12/15, usually remove 1-2L, TW is 100.5kg. On Fri, post HD weight 100.2kg. Patient has been on dialysis for 6 years. Has hx of nephrectomy due to a mass, then had knee related infection for which abx were needed and they further affected the kidney.  Everyone at home has been sick lately, no known COVID exposure, no little children. Denies any headaches, fevers/chills, chest pain, palpitations, and leg swelling.      PAST HISTORY  --------------------------------------------------------------------------------  PAST MEDICAL & SURGICAL HISTORY:  Renal failure, chronic      Hypertension      ESRD on dialysis      Diabetes        FAMILY HISTORY:    PAST SOCIAL HISTORY:    ALLERGIES & MEDICATIONS  --------------------------------------------------------------------------------  Allergies    penicillin (Unknown)    Intolerances      Standing Inpatient Medications  albuterol/ipratropium for Nebulization. 3 milliLiter(s) Nebulizer once  azithromycin  IVPB 500 milliGRAM(s) IV Intermittent once    PRN Inpatient Medications      REVIEW OF SYSTEMS  --------------------------------------------------------------------------------  per above    VITALS/PHYSICAL EXAM  --------------------------------------------------------------------------------  T(C): 37.3 (12-17-23 @ 14:29), Max: 37.9 (12-17-23 @ 14:01)  HR: 97 (12-17-23 @ 14:35) (80 - 97)  BP: 103/70 (12-17-23 @ 14:29) (103/70 - 147/76)  RR: 24 (12-17-23 @ 14:29) (18 - 24)  SpO2: 100% (12-17-23 @ 14:35) (92% - 100%)  Wt(kg): --    Weight (kg): 100.5 (12-17-23 @ 14:01)        Physical Exam:  	Gen: NAD  	HEENT: Anicteric  	Pulm: CTA B/L; on bipap  	CV: S1S2+  	Abd: Soft, +BS    	Ext: No LE edema B/L  	Neuro: Awake      	Skin: Warm and dry  	Dialysis access:     LABS/STUDIES  --------------------------------------------------------------------------------              10.0   11.90 >-----------<  190      [12-17-23 @ 14:33]              31.3           Mg     2.0     [12-17-23 @ 14:33]      PT/INR: PT 13.1 , INR 1.20       [12-17-23 @ 14:33]  PTT: 29.2       [12-17-23 @ 14:33]      Creatinine Trend:            Tacrolimus  Cyclosporine  Sirolimus  Mycophenolate  BK PCR  CMV PCR  Parvo PCR  EBV PCR [facility] DIVISION OF KIDNEY DISEASES AND HYPERTENSION -- [phone]  -- INITIAL CONSULT NOTE  --------------------------------------------------------------------------------  HPI:  83 y.o F w/ PMHx of ESRD on HD MTTHF, HTN, HLD, DM here with SOB and cough for 3 days. Fever noted today. Patient follows with Dr. Solis, at [place]. Sister cannulates at home. Last HD was Friday 12/15, usually remove 1-2L, TW is 100.5kg. On Fri, post HD weight 100.2kg. Patient has been on dialysis for 6 years. Has hx of nephrectomy due to a mass, then had knee related infection for which abx were needed and they further affected the kidney.  Everyone at home has been sick lately, no known COVID exposure, no little children. Denies any headaches, fevers/chills, chest pain, palpitations, and leg swelling.      PAST HISTORY  --------------------------------------------------------------------------------  PAST MEDICAL & SURGICAL HISTORY:  Renal failure, chronic      Hypertension      ESRD on dialysis      Diabetes        FAMILY HISTORY:    PAST SOCIAL HISTORY:    ALLERGIES & MEDICATIONS  --------------------------------------------------------------------------------  Allergies    penicillin (Unknown)    Intolerances      Standing Inpatient Medications  albuterol/ipratropium for Nebulization. 3 milliLiter(s) Nebulizer once  azithromycin  IVPB 500 milliGRAM(s) IV Intermittent once    PRN Inpatient Medications      REVIEW OF SYSTEMS  --------------------------------------------------------------------------------  per above    VITALS/PHYSICAL EXAM  --------------------------------------------------------------------------------  T(C): 37.3 (12-17-23 @ 14:29), Max: 37.9 (12-17-23 @ 14:01)  HR: 97 (12-17-23 @ 14:35) (80 - 97)  BP: 103/70 (12-17-23 @ 14:29) (103/70 - 147/76)  RR: 24 (12-17-23 @ 14:29) (18 - 24)  SpO2: 100% (12-17-23 @ 14:35) (92% - 100%)  Wt(kg): --    Weight (kg): 100.5 (12-17-23 @ 14:01)        Physical Exam:  	Gen: NAD  	HEENT: Anicteric  	Pulm: CTA B/L; on bipap  	CV: S1S2+  	Abd: Soft, +BS    	Ext: No LE edema B/L  	Neuro: Awake      	Skin: Warm and dry  	Dialysis access:     LABS/STUDIES  --------------------------------------------------------------------------------              10.0   11.90 >-----------<  190      [12-17-23 @ 14:33]              31.3           Mg     2.0     [12-17-23 @ 14:33]      PT/INR: PT 13.1 , INR 1.20       [12-17-23 @ 14:33]  PTT: 29.2       [12-17-23 @ 14:33]      Creatinine Trend:            Tacrolimus  Cyclosporine  Sirolimus  Mycophenolate  BK PCR  CMV PCR  Parvo PCR  EBV PCR [facility] DIVISION OF KIDNEY DISEASES AND HYPERTENSION -- [phone]  -- INITIAL CONSULT NOTE  --------------------------------------------------------------------------------  HPI:  83 y.o F w/ PMHx of ESRD on HD MTTHF, HTN, HLD, DM here with SOB and cough for 3 days. Fever noted today. Patient follows with Dr. Solis, at [place]. Sister cannulates at home. Last HD was Friday 12/15, usually remove 1-2L, TW is 100.5kg. On Fri, post HD weight 100.2kg. Patient has been on dialysis for 6 years. Has hx of nephrectomy due to a mass, then had knee related infection for which abx were needed and they further affected the kidney.  Everyone at home has been sick lately, no known COVID exposure, no little children. Denies any headaches, fevers/chills, chest pain, palpitations, and leg swelling.      PAST HISTORY  --------------------------------------------------------------------------------  PAST MEDICAL & SURGICAL HISTORY:  Renal failure, chronic      Hypertension      ESRD on dialysis      Diabetes        FAMILY HISTORY:    PAST SOCIAL HISTORY:    ALLERGIES & MEDICATIONS  --------------------------------------------------------------------------------  Allergies    penicillin (Unknown)    Intolerances      Standing Inpatient Medications  albuterol/ipratropium for Nebulization. 3 milliLiter(s) Nebulizer once  azithromycin  IVPB 500 milliGRAM(s) IV Intermittent once    PRN Inpatient Medications      REVIEW OF SYSTEMS  --------------------------------------------------------------------------------  per above    VITALS/PHYSICAL EXAM  --------------------------------------------------------------------------------  T(C): 37.3 (12-17-23 @ 14:29), Max: 37.9 (12-17-23 @ 14:01)  HR: 97 (12-17-23 @ 14:35) (80 - 97)  BP: 103/70 (12-17-23 @ 14:29) (103/70 - 147/76)  RR: 24 (12-17-23 @ 14:29) (18 - 24)  SpO2: 100% (12-17-23 @ 14:35) (92% - 100%)  Wt(kg): --    Weight (kg): 100.5 (12-17-23 @ 14:01)        Physical Exam:  	Gen: NAD  	HEENT: Anicteric  	Pulm: CTA B/L; on bipap  	CV: S1S2+  	Abd: Soft, +BS    	Ext: No LE edema B/L  	Neuro: Awake      	Skin: Warm and dry  	Dialysis access: left avg    LABS/STUDIES  --------------------------------------------------------------------------------              10.0   11.90 >-----------<  190      [12-17-23 @ 14:33]              31.3           Mg     2.0     [12-17-23 @ 14:33]      PT/INR: PT 13.1 , INR 1.20       [12-17-23 @ 14:33]  PTT: 29.2       [12-17-23 @ 14:33]      Creatinine Trend:            Tacrolimus  Cyclosporine  Sirolimus  Mycophenolate  BK PCR  CMV PCR  Parvo PCR  EBV PCR [facility] DIVISION OF KIDNEY DISEASES AND HYPERTENSION -- [phone]  -- INITIAL CONSULT NOTE  --------------------------------------------------------------------------------  HPI:  83 y.o F w/ PMHx of ESRD on HD MTTHF, HTN, HLD, DM here with SOB and cough for 3 days. Fever noted today. Patient follows with Dr. Solis, at [place]. Sister cannulates at home. Last HD was Friday 12/15, usually remove 1-2L, TW is 100.5kg. On Fri, post HD weight 100.2kg. Patient has been on dialysis for 6 years. Has hx of nephrectomy due to a mass, then had knee related infection for which abx were needed and they further affected the kidney.  Everyone at home has been sick lately, no known COVID exposure, no little children. Denies any headaches, fevers/chills, chest pain, palpitations, and leg swelling.      PAST HISTORY  --------------------------------------------------------------------------------  PAST MEDICAL & SURGICAL HISTORY:  Renal failure, chronic      Hypertension      ESRD on dialysis      Diabetes        FAMILY HISTORY:    PAST SOCIAL HISTORY:    ALLERGIES & MEDICATIONS  --------------------------------------------------------------------------------  Allergies    penicillin (Unknown)    Intolerances      Standing Inpatient Medications  albuterol/ipratropium for Nebulization. 3 milliLiter(s) Nebulizer once  azithromycin  IVPB 500 milliGRAM(s) IV Intermittent once    PRN Inpatient Medications      REVIEW OF SYSTEMS  --------------------------------------------------------------------------------  per above    VITALS/PHYSICAL EXAM  --------------------------------------------------------------------------------  T(C): 37.3 (12-17-23 @ 14:29), Max: 37.9 (12-17-23 @ 14:01)  HR: 97 (12-17-23 @ 14:35) (80 - 97)  BP: 103/70 (12-17-23 @ 14:29) (103/70 - 147/76)  RR: 24 (12-17-23 @ 14:29) (18 - 24)  SpO2: 100% (12-17-23 @ 14:35) (92% - 100%)  Wt(kg): --    Weight (kg): 100.5 (12-17-23 @ 14:01)        Physical Exam:  	Gen: NAD  	HEENT: Anicteric  	Pulm: CTA B/L; on bipap  	CV: S1S2+  	Abd: Soft, +BS    	Ext: No LE edema B/L  	Neuro: Awake      	Skin: Warm and dry  	Dialysis access: left avg    LABS/STUDIES  --------------------------------------------------------------------------------              10.0   11.90 >-----------<  190      [12-17-23 @ 14:33]              31.3           Mg     2.0     [12-17-23 @ 14:33]      PT/INR: PT 13.1 , INR 1.20       [12-17-23 @ 14:33]  PTT: 29.2       [12-17-23 @ 14:33]      Creatinine Trend:            Tacrolimus  Cyclosporine  Sirolimus  Mycophenolate  BK PCR  CMV PCR  Parvo PCR  EBV PCR

## 2023-12-17 NOTE — H&P ADULT - PROBLEM SELECTOR PLAN 4
Patient : Amelie Rodriguez Age: 55year old Sex: female   MRN: [id number] Encounter Date: 11/4/2017    O35/O35    History     Chief Complaint   Patient presents with   â¢ Shortness of Breath   â¢ Cough       Pt is Greek speaking. History was obtained with the assistance of a . HPI  11/4/2017  7:57 PM Amelie Rodriguez is a 55year old female who presents to the ED c/o SOB that began yesterday. Pt is additionally experiencing cough, fever, chills, and vomiting. Pt states she was seen yesterday at [facility], diagnosed with pneumonia and was prescribed antibiotics and Prednisone, but has not filled the prescription yet. Patient denies any other symptoms. Pt wears O2 at home while she sleeps but not during the day. No other complaints or modifying factors were reported. Temperature is 101.2 F when rechecked. PCP: Bolivar Ching MD    Allergies   Allergen Reactions   â¢ Levaquin      hives   â¢ Penicillins HIVES       Prior to Admission Medications    ALBUTEROL (PROAIR RESPICLICK) 219 (90 BASE) MCG/ACT INHALER    Inhale 2 puffs into the lungs every 4 hours as needed for Shortness of Breath or Wheezing. ALBUTEROL-IPRATROPIUM (COMBIVENT RESPIMAT)  MCG/ACT INHALER    Inhale 1 puff into the lungs every 4 hours as needed for Shortness of Breath. ALBUTEROL-IPRATROPIUM 2.5 MG/0.5 MG (DUONEB) 0.5-2.5 (3) MG/3ML NEBULIZER SOLUTION    Take 3 mLs by nebulization every 6 hours as needed for Wheezing. ASPIRIN 81 MG TABLET    Take 81 mg by mouth daily. AZITHROMYCIN (ZITHROMAX TRI-DEIDRE) 500 MG TABLET    Take 1 tablet by mouth daily. BUSPIRONE (BUSPAR) 10 MG TABLET    Take 10 mg by mouth 3 times daily. CLONAZEPAM (KLONOPIN) 1 MG TABLET    Take 1 mg by mouth 2 times daily as needed for Anxiety. FLUTICASONE-SALMETEROL (ADVAIR DISKUS) 250-50 MCG/DOSE AEROSOL POWDER, BREATH ACTIVATED    Inhale 1 puff into the lungs two times daily.     HYDROCODONE-ACETAMINOPHEN (NORCO) 5-325 MG PER TABLET Take 1-2 tablets by mouth every 6 hours as needed for Pain. LACOSAMIDE 150 MG TAB    Take 150 mg by mouth 2 times daily. LURASIDONE (LATUDA) 20 MG TABLET    Take 20 mg by mouth daily (with breakfast). MIRTAZAPINE (REMERON SOL-TAB) 45 MG DISINTEGRATING TABLET    Take 45 mg by mouth nightly. OMEPRAZOLE (PRILOSEC) 40 MG CAPSULE    Take 40 mg by mouth daily. PANTOPRAZOLE (PROTONIX) 40 MG TABLET    Take 1 tablet by mouth daily. PANTOPRAZOLE (PROTONIX) 40 MG TABLET    Take 1 tablet by mouth daily. PREDNISONE (DELTASONE) 10 MG TABLET    [place] 4 pastillas por 3 farah, luego tome 2 pastillas por 3 farah, luego 1 pastilla por 3 farah    RAMELTEON (ROZEREM) 8 MG TABLET    Take 8 mg by mouth nightly. TRAZODONE (DESYREL) 100 MG TABLET    Take 100 mg by mouth nightly.      Past Medical History:   Diagnosis Date   â¢ Alcohol abuse     to start antidrinking pill on monday 3/11   â¢ Depression    â¢ Diabetes mellitus (CMS/[facility])    â¢ Emphysema lung (CMS/[facility]) 6/16/2016    Followed by Dr. Renard Lefort [phone] [facility]   â¢ Essential (primary) hypertension    â¢ Osteoporosis    â¢ Personal history of traumatic fracture fractured finger on right hand   â¢ Pneumonia     Followed by Dr. Trav Chawla [phone] [facility]   â¢ RAD (reactive airway disease)     Followed by Dr. Trav Chawla [phone] [facility]   â¢ Sarcoidosis (CMS/[facility])     Followed by Dr. Renard Lefort [phone] [facility]   â¢ Tobacco use disorder     Followed by Dr. Renard Lefort [phone] [facility]       Past Surgical History:   Procedure Laterality Date   â¢ LUNG BIOPSY  May/2006    negative       Family History   Problem Relation Age of Onset   â¢ Seizures Mother    â¢ Stroke Father        Social History   Substance Use Topics   â¢ Smoking status: Current Every Day Smoker     Packs/day: 0.25 "Types: Cigarettes     Start date: 1/1/1979   â¢ Smokeless tobacco: Never Used   â¢ Alcohol use No       Review of Systems   Constitutional: Positive for chills and fever. Negative for diaphoresis. HENT: Negative for congestion and sore throat. Eyes: Negative for visual disturbance. Respiratory: Positive for cough and shortness of breath. Cardiovascular: Negative for chest pain. Gastrointestinal: Positive for vomiting. Negative for abdominal pain, diarrhea and nausea. Genitourinary: Negative for difficulty urinating, dysuria and frequency. Musculoskeletal: Negative for arthralgias and myalgias. Skin: Negative for rash. Neurological: Negative for dizziness and weakness. Hematological: Does not bruise/bleed easily. Psychiatric/Behavioral: Negative for confusion. Physical Exam     ED Triage Vitals [11/04/17 2000]   ED Triage Vitals Group      Temp 101.2 Â°F (38.4 Â°C)      Pulse 126      Resp (!) 32      /75      SpO2 96 %      EtCO2 mmHg       Height 5' 4"" (1.626 m)      Weight 135 lb 2.3 oz (61.3 kg)      Weight Scale Used ED Actual       Physical Exam   Constitutional: She is oriented to person, place, and time. She appears well-developed and well-nourished. No distress. HENT:   Head: Normocephalic. Mouth/Throat: Oropharynx is clear and moist.   Eyes: Pupils are equal, round, and reactive to light. Right eye exhibits no discharge. Left eye exhibits no discharge. No scleral icterus. Neck: Normal range of motion. Neck supple. No JVD present. Cardiovascular: Regular rhythm, normal heart sounds and intact distal pulses. Tachycardia present. Exam reveals no gallop, no friction rub and no decreased pulses. No murmur heard. Pulmonary/Chest: Effort normal. No accessory muscle usage or stridor. Tachypnea noted. No respiratory distress. She has decreased breath sounds (Bilaterally). She has no wheezes. She has no rhonchi. She has no rales. She exhibits no tenderness.    Abdominal: " Soft. Bowel sounds are normal. She exhibits no distension and no mass. There is no tenderness. There is no rebound and no guarding. Musculoskeletal: Normal range of motion. She exhibits no edema or tenderness. Lymphadenopathy:     She has no cervical adenopathy. Neurological: She is alert and oriented to person, place, and time. She has normal strength. She displays no atrophy and no tremor. No cranial nerve deficit or sensory deficit. She exhibits normal muscle tone. Coordination normal. GCS eye subscore is 4. GCS verbal subscore is 5. GCS motor subscore is 6. Skin: Skin is warm and dry. No rash noted. She is not diaphoretic. No erythema. No pallor. Psychiatric: She has a normal mood and affect. Her behavior is normal. Judgment and thought content normal.   Nursing note and vitals reviewed.     ED Course     Procedures    Lab Results     Results for orders placed or performed during the hospital encounter of 11/04/17   CBC & Auto Differential   Result Value Ref Range    WBC 12.6 (H) 4.2 - 11.0 K/mcL    RBC 4.63 4.00 - 5.20 mil/mcL    HGB 13.7 12.0 - 15.5 g/dL    HCT 40.1 36.0 - 46.5 %    MCV 86.6 78.0 - 100.0 fl    MCH 29.6 26.0 - 34.0 pg    MCHC 34.2 32.0 - 36.5 g/dL    RDW-CV 15.0 11.0 - 15.0 %     140 - 450 K/mcL    DIFF TYPE AUTOMATED DIFFERENTIAL     Neutrophil 81 %    LYMPH 13 %    MONO 6 %    EOSIN 0 %    BASO 0 %    Absolute Neutrophil 10.3 (H) 1.8 - 7.7 K/mcL    Absolute Lymph 1.7 1.0 - 4.0 K/mcL    Absolute Mono 0.7 0.3 - 0.9 K/mcL    Absolute Eos 0.0 (L) 0.1 - 0.5 K/mcL    Absolute Baso 0.0 0.0 - 0.3 K/mcL   Prothrombin Time   Result Value Ref Range    PROTIME 14.3 (H) 9.7 - 11.8 sec    INR 1.3    Basic Metabolic Panel   Result Value Ref Range    Sodium 136 135 - 145 mmol/L    Potassium 3.5 3.4 - 5.1 mmol/L    Chloride 102 98 - 107 mmol/L    Carbon Dioxide 30 21 - 32 mmol/L    Anion Gap 8 (L) 10 - 20 mmol/L    Glucose 70 65 - 99 mg/dL    BUN 10 6 - 20 mg/dL    Creatinine 0.72 0.51 - 0.95 mg/dL    GFR Estimate,  >90     GFR Estimate, Non African American >90     BUN/Creatinine Ratio 14 7 - 25    CALCIUM 8.9 8.4 - 10.2 mg/dL   Hepatic Function Panel   Result Value Ref Range    Albumin 3.7 3.6 - 5.1 g/dL    TOTAL BILIRUBIN 0.2 0.2 - 1.0 mg/dL    DIRECT BILIRUBIN 0.1 0.0 - 0.2 mg/dL    ALK PHOSPHATASE 106 45 - 117 Units/L    ALT/SGPT 31 <79 Units/L    AST/SGOT 20 <38 Units/L    TOTAL PROTEIN 7.4 6.4 - 8.2 g/dL   Urinalysis & Reflex Micro   Result Value Ref Range    COLOR YELLOW YELLOW    APPEARANCE CLOUDY     GLUCOSE(URINE) NEGATIVE NEGATIVE mg/dL    BILIRUBIN NEGATIVE NEGATIVE    KETONES NEGATIVE NEGATIVE mg/dL    SPECIFIC GRAVITY <1.005 (L) 1.005 - 1.030    BLOOD SMALL (A) NEGATIVE    pH 6.5 5.0 - 7.0 Units    PROTEIN(URINE) NEGATIVE NEGATIVE mg/dL    UROBILINOGEN 0.2 0.0 - 1.0 mg/dL    NITRITE NEGATIVE NEGATIVE    LEUKOCYTE ESTERASE SMALL (A) NEGATIVE    SPECIMEN TYPE URINE, CLEAN CATCH/MIDSTREAM    Procalcitonin   Result Value Ref Range    PROCALCITONIN 0.06 <0.10 ng/mL   Chem 8 Panel - Point of Care   Result Value Ref Range    Sodium  135 - 145 mmol/L    Potassium POC 3.3 (L) 3.4 - 5.1 mmol/L    Chloride POC 99 98 - 107 mmol/L    CALCIUM IONIZED-POC 1.08 (L) 1.15 - 1.29 mmol/L    CO2 Total 26 (H) 19 - 24 mmol/L    GLUCOSE POC 66 65 - 99 mg/dL    BUN POC 8 6 - 20 mg/dL    HEMATOCRIT POC 43.0 36.0 - 46.5 %    Hemoglobin POC 14.6 12.0 - 15.5 g/dL    ANION GAP POC 17 mmol/L    Creatinine POC 0.80 0.51 - 0.95 mg/dL    Estimated GFR  (POC) >90     Estimated GFR Non- (POC) 83    Lactic Acid Venous - Point of Care   Result Value Ref Range    Lactic Acid Venous 1.8 <2.0 mmol/L   Troponin I - Point of Care   Result Value Ref Range    Troponin I POC <0.10 <0.10 ng/mL   Microscopic Urine   Result Value Ref Range    Squamous EPI'S 1 to 5 0 - 5 /hpf    RBC 1 to 3 0 - 3 /hpf    WBC 1 to 5 0 - 5 /hpf    BACTERIA LARGE (A) NONE SEEN /hpf    Hyaline Casts NONE SEEN 0 - 5 /lpf   Influenza Rapid A/B   Result Value Ref Range    SOURCE NASOPHARYNGEAL SWAB     INFLUENZA A NEGATIVE NEGATIVE    INFLUENZA B ANTIGEN NEGATIVE NEGATIVE   Blood Gas Venous - Point of Care   Result Value Ref Range    PH Venous POC 7.48 (H) 7.35 - 7.45 Units    PCO2 Venous 39 38 - 51 mm Hg    PO2 Venous 28 (L) 35 - 42 mm Hg    HCO3 Venous 29 (H) 22 - 28 mmol/L    Base Excess Venous 5 (H) 0 - 2 mmol/L    O2 SAT Venous 57 (L) 60 - 80 %       EKG Results     EKG Interpretation  Rate: 118  Rhythm: sinus tachycardia   Abnormality: LAE   No significant change when compared to Oct 9, 2017. EKG interpreted by ED physician    Radiology Results     Imaging Results          CT Chest PE Imaging (In process)                XR Chest AP or PA (Final result)  Result time 11/04/17 21:52:34    Final result                 Impression:    FINDINGS/IMPRESSION:     Allowing for differences in technique, either no significant change versus  slight improvement in the aeration of the lungs. Again noted scattered moderately prominent reticulated scarring/fibrosis  throughout the lungs, compatible with patient's history of sarcoidosis and  interstitial lung disease. No new focal consolidation. No sizable effusion. Hypertrophic degenerative changes thoracic spine. Narrative:    EXAM: XR CHEST AP OR PA. HISTORY: Fever, shortness of breath. COMPARISON: CT and radiograph 08/20/2017.         TECHNIQUE: AP portable 90 degree upright view of the chest.                                  ED Medication Orders     Start Ordered     Status Ordering Provider    11/05/17 0008 11/05/17 0007  methylPREDNISolone (solu-MEDROL) PF injection 125 mg  ONCE      Last MAR action:  Given Keenan Ode    11/04/17 2245 11/04/17 2244  sodium chloride (NORMAL SALINE) 0.9 % bolus 1,000 mL  ONCE      Last MAR action:  [address] Graylon Million D    11/04/17 2245 11/04/17 2244  acetaminophen (TYLENOL) tablet 1,000 mg ONCE      Last MAR action:  Given Sherry Abt    11/04/17 2217 11/04/17 2216  cefTRIAXone (ROCEPHIN) syringe 1,000 mg  ONCE      Last MAR action:  Given Sherry Abt    11/04/17 2002 11/04/17 2001  sodium chloride (PF) 0.9 % injection 2 mL  (Capped IV)  ONCE      Last MAR action:  Given Sherry Abt    11/04/17 2002 11/04/17 2001  albuterol (VENTOLIN) nebulizer 15 mg  ONCE      Last MAR action:  Given Pama Maddie D    11/04/17 2002 11/04/17 2001  ipratropium (ATROVENT) 0.02 % nebulizer solution 0.5 mg  ONCE      Last MAR action:  Given Sherry Abt    11/04/17 2000 11/04/17 2001  sodium chloride (PF) 0.9 % injection 2 mL  (Capped IV)  PRN      Acknowledged Pama Maddie D          MDM  Vitals  Vitals:    11/04/17 2230 11/04/17 2235 11/04/17 2300 11/04/17 2315   BP: 91/47 93/48 97/59 100/62   Pulse: 110 112 115 117   Resp: (!) 32 (!) 40 (!) 40 (!) 40   Temp:       TempSrc:       SpO2: 97% 98% 97% 99%   Weight:       Height:           ED Course  7:59 PM  I discussed the intra-system transfer policy and process with the patient and the patient has not accepted transfer. 10:11 PM I rechecked the pt who is resting comfortably. I updated the pt on her's ED work up which do not indicate pneumonia. I informed her that I suspect an UTI. Pt reports she has a recent cough, and I discussed an influenza swab. I further discussed treatment with Rocephin and the associated risks given her allergy to Penicillin. We further discussed that there is an indication for further care beyond the Ed. The patient indicates understanding of these issues and agrees with the plan. 11:56 PM I spoke with Dr. Phillip Howell, Internal Medicine, regarding the patient's presentation of fever of 101.2, cough, SOB. I informed him that her WBC is elevated at 12.6 and I suspect her urine is the cause.  I informed him that her BP is per her normal. Dr. Phillip Howell recommended a CT chest for PE and we collaboratively agreed upon plan for further care pending the CT chest.       Patient care signed out to Dr. Lonnie Santana at the end of my shift. Sign-out included relevant details of history, physical, and work-up to date. Will follow up on CT results and plan of care. [facility]  Critical Care time spent on this patient outside of billable procedures:  None    Clinical Impression:  ED Diagnoses        Final diagnoses    Acute UTI     COPD exacerbation (CMS/[facility])               Pt to be admitted to Dr. Guadalupe De Leon in stable condition.        I have reviewed the information recorded by the scribe for accuracy and agree with its contents.    ____________________________________________________________________  Harpreet Robles acting as a scribe for Dr. Siddharth Rinaldi [facility] # [id number]  Scribe: David Delgado MD  11/05/17 0531 Likely AoCD 2/2 ESRD  Hgb at baseline    - no need for epo at this time  - daily CBC Elevated troponin to 313 on admission to 317  Chest pain-free, EKG on admission with sinus tach, LAD, no ST segment changes   Low suspicion for ACS at this time, trops likely elevated from ESRD +/- demand of sepsis   - trend trops to peak, c/s cardiology if significant delta   - monitor tele   - TTE eval for [facility]

## 2023-12-17 NOTE — ED PROVIDER NOTE - NSICDXPASTMEDICALHX_GEN_ALL_CORE_FT
PAST MEDICAL HISTORY:  Diabetes     ESRD on dialysis     Hypertension     Renal failure, chronic

## 2023-12-18 LAB
A1C WITH ESTIMATED AVERAGE GLUCOSE RESULT: 6.4 % — HIGH (ref 4–5.6)
A1C WITH ESTIMATED AVERAGE GLUCOSE RESULT: 6.4 % — HIGH (ref 4–5.6)
ALBUMIN SERPL ELPH-MCNC: 3.4 G/DL — SIGNIFICANT CHANGE UP (ref 3.3–5)
ALBUMIN SERPL ELPH-MCNC: 3.4 G/DL — SIGNIFICANT CHANGE UP (ref 3.3–5)
ALP SERPL-CCNC: 74 U/L — SIGNIFICANT CHANGE UP (ref 40–120)
ALP SERPL-CCNC: 74 U/L — SIGNIFICANT CHANGE UP (ref 40–120)
ALT FLD-CCNC: 18 U/L — SIGNIFICANT CHANGE UP (ref 10–45)
ALT FLD-CCNC: 18 U/L — SIGNIFICANT CHANGE UP (ref 10–45)
ANION GAP SERPL CALC-SCNC: 14 MMOL/L — SIGNIFICANT CHANGE UP (ref 5–17)
ANION GAP SERPL CALC-SCNC: 14 MMOL/L — SIGNIFICANT CHANGE UP (ref 5–17)
AST SERPL-CCNC: 22 U/L — SIGNIFICANT CHANGE UP (ref 10–40)
AST SERPL-CCNC: 22 U/L — SIGNIFICANT CHANGE UP (ref 10–40)
BASE EXCESS BLDA CALC-SCNC: 4 MMOL/L — HIGH (ref -2–3)
BASE EXCESS BLDA CALC-SCNC: 4 MMOL/L — HIGH (ref -2–3)
BASE EXCESS BLDV CALC-SCNC: 5.1 MMOL/L — HIGH (ref -2–3)
BASE EXCESS BLDV CALC-SCNC: 5.1 MMOL/L — HIGH (ref -2–3)
BILIRUB SERPL-MCNC: 0.2 MG/DL — SIGNIFICANT CHANGE UP (ref 0.2–1.2)
BILIRUB SERPL-MCNC: 0.2 MG/DL — SIGNIFICANT CHANGE UP (ref 0.2–1.2)
BUN SERPL-MCNC: 43 MG/DL — HIGH (ref 7–23)
BUN SERPL-MCNC: 43 MG/DL — HIGH (ref 7–23)
CA-I SERPL-SCNC: 1.19 MMOL/L — SIGNIFICANT CHANGE UP (ref 1.15–1.33)
CA-I SERPL-SCNC: 1.19 MMOL/L — SIGNIFICANT CHANGE UP (ref 1.15–1.33)
CALCIUM SERPL-MCNC: 9.7 MG/DL — SIGNIFICANT CHANGE UP (ref 8.4–10.5)
CALCIUM SERPL-MCNC: 9.7 MG/DL — SIGNIFICANT CHANGE UP (ref 8.4–10.5)
CHLORIDE BLDV-SCNC: 94 MMOL/L — LOW (ref 96–108)
CHLORIDE BLDV-SCNC: 94 MMOL/L — LOW (ref 96–108)
CHLORIDE SERPL-SCNC: 91 MMOL/L — LOW (ref 96–108)
CHLORIDE SERPL-SCNC: 91 MMOL/L — LOW (ref 96–108)
CO2 BLDA-SCNC: 33 MMOL/L — HIGH (ref 19–24)
CO2 BLDA-SCNC: 33 MMOL/L — HIGH (ref 19–24)
CO2 BLDV-SCNC: 34 MMOL/L — HIGH (ref 22–26)
CO2 BLDV-SCNC: 34 MMOL/L — HIGH (ref 22–26)
CO2 SERPL-SCNC: 28 MMOL/L — SIGNIFICANT CHANGE UP (ref 22–31)
CO2 SERPL-SCNC: 28 MMOL/L — SIGNIFICANT CHANGE UP (ref 22–31)
CREAT SERPL-MCNC: 6.69 MG/DL — HIGH (ref 0.5–1.3)
CREAT SERPL-MCNC: 6.69 MG/DL — HIGH (ref 0.5–1.3)
EGFR: 6 ML/MIN/1.73M2 — LOW
EGFR: 6 ML/MIN/1.73M2 — LOW
ESTIMATED AVERAGE GLUCOSE: 137 MG/DL — HIGH (ref 68–114)
ESTIMATED AVERAGE GLUCOSE: 137 MG/DL — HIGH (ref 68–114)
GAS PNL BLDV: 129 MMOL/L — LOW (ref 136–145)
GAS PNL BLDV: 129 MMOL/L — LOW (ref 136–145)
GAS PNL BLDV: SIGNIFICANT CHANGE UP
GAS PNL BLDV: SIGNIFICANT CHANGE UP
GLUCOSE BLDC GLUCOMTR-MCNC: 106 MG/DL — HIGH (ref 70–99)
GLUCOSE BLDC GLUCOMTR-MCNC: 106 MG/DL — HIGH (ref 70–99)
GLUCOSE BLDC GLUCOMTR-MCNC: 157 MG/DL — HIGH (ref 70–99)
GLUCOSE BLDC GLUCOMTR-MCNC: 157 MG/DL — HIGH (ref 70–99)
GLUCOSE BLDC GLUCOMTR-MCNC: 178 MG/DL — HIGH (ref 70–99)
GLUCOSE BLDC GLUCOMTR-MCNC: 178 MG/DL — HIGH (ref 70–99)
GLUCOSE BLDC GLUCOMTR-MCNC: 98 MG/DL — SIGNIFICANT CHANGE UP (ref 70–99)
GLUCOSE BLDC GLUCOMTR-MCNC: 98 MG/DL — SIGNIFICANT CHANGE UP (ref 70–99)
GLUCOSE BLDV-MCNC: 128 MG/DL — HIGH (ref 70–99)
GLUCOSE BLDV-MCNC: 128 MG/DL — HIGH (ref 70–99)
GLUCOSE SERPL-MCNC: 165 MG/DL — HIGH (ref 70–99)
GLUCOSE SERPL-MCNC: 165 MG/DL — HIGH (ref 70–99)
HCO3 BLDA-SCNC: 32 MMOL/L — HIGH (ref 21–28)
HCO3 BLDA-SCNC: 32 MMOL/L — HIGH (ref 21–28)
HCO3 BLDV-SCNC: 32 MMOL/L — HIGH (ref 22–29)
HCO3 BLDV-SCNC: 32 MMOL/L — HIGH (ref 22–29)
HCT VFR BLD CALC: 30.9 % — LOW (ref 34.5–45)
HCT VFR BLD CALC: 30.9 % — LOW (ref 34.5–45)
HCT VFR BLDA CALC: 29 % — LOW (ref 34.5–46.5)
HCT VFR BLDA CALC: 29 % — LOW (ref 34.5–46.5)
HGB BLD CALC-MCNC: 9.8 G/DL — LOW (ref 11.7–16.1)
HGB BLD CALC-MCNC: 9.8 G/DL — LOW (ref 11.7–16.1)
HGB BLD-MCNC: 9.7 G/DL — LOW (ref 11.5–15.5)
HGB BLD-MCNC: 9.7 G/DL — LOW (ref 11.5–15.5)
LACTATE BLDV-MCNC: 1.1 MMOL/L — SIGNIFICANT CHANGE UP (ref 0.5–2)
LACTATE BLDV-MCNC: 1.1 MMOL/L — SIGNIFICANT CHANGE UP (ref 0.5–2)
MAGNESIUM SERPL-MCNC: 2.2 MG/DL — SIGNIFICANT CHANGE UP (ref 1.6–2.6)
MAGNESIUM SERPL-MCNC: 2.2 MG/DL — SIGNIFICANT CHANGE UP (ref 1.6–2.6)
MCHC RBC-ENTMCNC: 31.4 GM/DL — LOW (ref 32–36)
MCHC RBC-ENTMCNC: 31.4 GM/DL — LOW (ref 32–36)
MCHC RBC-ENTMCNC: 33.3 PG — SIGNIFICANT CHANGE UP (ref 27–34)
MCHC RBC-ENTMCNC: 33.3 PG — SIGNIFICANT CHANGE UP (ref 27–34)
MCV RBC AUTO: 106.2 FL — HIGH (ref 80–100)
MCV RBC AUTO: 106.2 FL — HIGH (ref 80–100)
MRSA PCR RESULT.: SIGNIFICANT CHANGE UP
MRSA PCR RESULT.: SIGNIFICANT CHANGE UP
NRBC # BLD: 0 /100 WBCS — SIGNIFICANT CHANGE UP (ref 0–0)
NRBC # BLD: 0 /100 WBCS — SIGNIFICANT CHANGE UP (ref 0–0)
PCO2 BLDA: 60 MMHG — HIGH (ref 32–45)
PCO2 BLDA: 60 MMHG — HIGH (ref 32–45)
PCO2 BLDV: 61 MMHG — HIGH (ref 39–42)
PCO2 BLDV: 61 MMHG — HIGH (ref 39–42)
PH BLDA: 7.33 — LOW (ref 7.35–7.45)
PH BLDA: 7.33 — LOW (ref 7.35–7.45)
PH BLDV: 7.33 — SIGNIFICANT CHANGE UP (ref 7.32–7.43)
PH BLDV: 7.33 — SIGNIFICANT CHANGE UP (ref 7.32–7.43)
PHOSPHATE SERPL-MCNC: 5.7 MG/DL — HIGH (ref 2.5–4.5)
PHOSPHATE SERPL-MCNC: 5.7 MG/DL — HIGH (ref 2.5–4.5)
PLATELET # BLD AUTO: 194 K/UL — SIGNIFICANT CHANGE UP (ref 150–400)
PLATELET # BLD AUTO: 194 K/UL — SIGNIFICANT CHANGE UP (ref 150–400)
PO2 BLDA: 126 MMHG — HIGH (ref 83–108)
PO2 BLDA: 126 MMHG — HIGH (ref 83–108)
PO2 BLDV: 73 MMHG — HIGH (ref 25–45)
PO2 BLDV: 73 MMHG — HIGH (ref 25–45)
POTASSIUM BLDV-SCNC: 5.3 MMOL/L — HIGH (ref 3.5–5.1)
POTASSIUM BLDV-SCNC: 5.3 MMOL/L — HIGH (ref 3.5–5.1)
POTASSIUM SERPL-MCNC: 5.7 MMOL/L — HIGH (ref 3.5–5.3)
POTASSIUM SERPL-MCNC: 5.7 MMOL/L — HIGH (ref 3.5–5.3)
POTASSIUM SERPL-SCNC: 5.7 MMOL/L — HIGH (ref 3.5–5.3)
POTASSIUM SERPL-SCNC: 5.7 MMOL/L — HIGH (ref 3.5–5.3)
PROT SERPL-MCNC: 6.6 G/DL — SIGNIFICANT CHANGE UP (ref 6–8.3)
PROT SERPL-MCNC: 6.6 G/DL — SIGNIFICANT CHANGE UP (ref 6–8.3)
RBC # BLD: 2.91 M/UL — LOW (ref 3.8–5.2)
RBC # BLD: 2.91 M/UL — LOW (ref 3.8–5.2)
RBC # FLD: 13.6 % — SIGNIFICANT CHANGE UP (ref 10.3–14.5)
RBC # FLD: 13.6 % — SIGNIFICANT CHANGE UP (ref 10.3–14.5)
S AUREUS DNA NOSE QL NAA+PROBE: DETECTED
S AUREUS DNA NOSE QL NAA+PROBE: DETECTED
SAO2 % BLDA: 99.5 % — HIGH (ref 94–98)
SAO2 % BLDA: 99.5 % — HIGH (ref 94–98)
SAO2 % BLDV: 96.5 % — HIGH (ref 67–88)
SAO2 % BLDV: 96.5 % — HIGH (ref 67–88)
SODIUM SERPL-SCNC: 133 MMOL/L — LOW (ref 135–145)
SODIUM SERPL-SCNC: 133 MMOL/L — LOW (ref 135–145)
TROPONIN T, HIGH SENSITIVITY RESULT: 285 NG/L — HIGH (ref 0–51)
TROPONIN T, HIGH SENSITIVITY RESULT: 285 NG/L — HIGH (ref 0–51)
WBC # BLD: 12.09 K/UL — HIGH (ref 3.8–10.5)
WBC # BLD: 12.09 K/UL — HIGH (ref 3.8–10.5)
WBC # FLD AUTO: 12.09 K/UL — HIGH (ref 3.8–10.5)
WBC # FLD AUTO: 12.09 K/UL — HIGH (ref 3.8–10.5)

## 2023-12-18 PROCEDURE — 99222 1ST HOSP IP/OBS MODERATE 55: CPT | Mod: GC

## 2023-12-18 PROCEDURE — 99233 SBSQ HOSP IP/OBS HIGH 50: CPT

## 2023-12-18 PROCEDURE — 71250 CT THORAX DX C-: CPT | Mod: 26

## 2023-12-18 RX ORDER — MUPIROCIN 20 MG/G
1 OINTMENT TOPICAL
Refills: 0 | Status: DISCONTINUED | OUTPATIENT
Start: 2023-12-18 | End: 2023-12-22

## 2023-12-18 RX ORDER — INSULIN LISPRO 100/ML
VIAL (ML) SUBCUTANEOUS EVERY 6 HOURS
Refills: 0 | Status: DISCONTINUED | OUTPATIENT
Start: 2023-12-18 | End: 2023-12-20

## 2023-12-18 RX ORDER — SEVELAMER CARBONATE 2400 MG/1
800 POWDER, FOR SUSPENSION ORAL
Refills: 0 | Status: DISCONTINUED | OUTPATIENT
Start: 2023-12-18 | End: 2023-12-22

## 2023-12-18 RX ORDER — SODIUM ZIRCONIUM CYCLOSILICATE 10 G/10G
5 POWDER, FOR SUSPENSION ORAL ONCE
Refills: 0 | Status: COMPLETED | OUTPATIENT
Start: 2023-12-18 | End: 2023-12-18

## 2023-12-18 RX ADMIN — Medication 3 MILLILITER(S): at 00:14

## 2023-12-18 RX ADMIN — Medication 100 MILLIGRAM(S): at 22:36

## 2023-12-18 RX ADMIN — Medication 3 MILLILITER(S): at 23:16

## 2023-12-18 RX ADMIN — AZITHROMYCIN 255 MILLIGRAM(S): 500 TABLET, FILM COATED ORAL at 22:35

## 2023-12-18 RX ADMIN — Medication 3 MILLILITER(S): at 11:25

## 2023-12-18 RX ADMIN — INSULIN GLARGINE 12 UNIT(S): 100 INJECTION, SOLUTION SUBCUTANEOUS at 22:46

## 2023-12-18 RX ADMIN — SODIUM ZIRCONIUM CYCLOSILICATE 5 GRAM(S): 10 POWDER, FOR SUSPENSION ORAL at 08:20

## 2023-12-18 RX ADMIN — Medication 100 MILLIGRAM(S): at 11:26

## 2023-12-18 RX ADMIN — Medication 1: at 06:06

## 2023-12-18 RX ADMIN — HEPARIN SODIUM 5000 UNIT(S): 5000 INJECTION INTRAVENOUS; SUBCUTANEOUS at 06:05

## 2023-12-18 RX ADMIN — HEPARIN SODIUM 5000 UNIT(S): 5000 INJECTION INTRAVENOUS; SUBCUTANEOUS at 22:36

## 2023-12-18 RX ADMIN — CEFTRIAXONE 100 MILLIGRAM(S): 500 INJECTION, POWDER, FOR SOLUTION INTRAMUSCULAR; INTRAVENOUS at 22:35

## 2023-12-18 RX ADMIN — ATORVASTATIN CALCIUM 20 MILLIGRAM(S): 80 TABLET, FILM COATED ORAL at 22:36

## 2023-12-18 RX ADMIN — Medication 3 MILLILITER(S): at 06:05

## 2023-12-18 NOTE — PROGRESS NOTE ADULT - SUBJECTIVE AND OBJECTIVE BOX
PROGRESS NOTE:   Authored by Dr. Kimberlee Moscoso MD (PGY-1). Pager [facility] [phone] / [place] ( [id number]) or via TEAMS    Patient is a 83y old  Female who presents with a chief complaint of AHRF (17 Dec 2023 19:17)      SUBJECTIVE / OVERNIGHT EVENTS:  No acute events overnight.     ADDITIONAL REVIEW OF SYSTEMS:  Patient denies fevers, chills, chest pain, shortness of breath, nausea, abdominal pain, diarrhea, constipation, dysuria, leg swelling, headache, light headedness.    MEDICATIONS  (STANDING):  albuterol/ipratropium for Nebulization 3 milliLiter(s) Nebulizer every 6 hours  atorvastatin 20 milliGRAM(s) Oral at bedtime  azithromycin  IVPB 500 milliGRAM(s) IV Intermittent every 24 hours  cefTRIAXone   IVPB 1000 milliGRAM(s) IV Intermittent every 24 hours  dextrose 5%. 1000 milliLiter(s) (100 mL/Hr) IV Continuous <Continuous>  dextrose 50% Injectable 25 Gram(s) IV Push once  famotidine    Tablet 20 milliGRAM(s) Oral daily  glucagon  Injectable 1 milliGRAM(s) IntraMuscular once  heparin   Injectable 5000 Unit(s) SubCutaneous every 12 hours  insulin glargine Injectable (LANTUS) 12 Unit(s) SubCutaneous at bedtime  insulin lispro (ADMELOG) corrective regimen sliding scale   SubCutaneous every 6 hours  levothyroxine 75 MICROGram(s) Oral daily  sevelamer carbonate 800 milliGRAM(s) Oral <User Schedule>  sodium zirconium cyclosilicate 5 Gram(s) Oral once    MEDICATIONS  (PRN):      CAPILLARY BLOOD GLUCOSE      POCT Blood Glucose.: 157 mg/dL (18 Dec 2023 06:00)  POCT Blood Glucose.: 239 mg/dL (17 Dec 2023 22:03)    I&O's Summary      PHYSICAL EXAM:  Vital Signs Last 24 Hrs  T(C): 36.3 (18 Dec 2023 07:13), Max: 37.9 (17 Dec 2023 14:01)  T(F): 97.4 (18 Dec 2023 07:13), Max: 100.2 (17 Dec 2023 14:01)  HR: 74 (18 Dec 2023 07:13) (69 - 97)  BP: 103/77 (18 Dec 2023 07:13) (95/69 - 147/76)  BP(mean): 81 (18 Dec 2023 07:13) (59 - 81)  RR: 18 (18 Dec 2023 07:13) (16 - 24)  SpO2: 100% (18 Dec 2023 07:13) (92% - 100%)    Parameters below as of 18 Dec 2023 07:13  Patient On (Oxygen Delivery Method): bipap        CONSTITUTIONAL: NAD, well-developed  RESPIRATORY: Normal respiratory effort; lungs are clear to auscultation bilaterally  CARDIOVASCULAR: Regular rate and rhythm, normal S1 and S2, no murmur/rub/gallop; No lower extremity edema; Peripheral pulses are 2+ bilaterally  ABDOMEN: Nontender to palpation, normoactive bowel sounds, no rebound/guarding; No hepatosplenomegaly  MSK: no clubbing or cyanosis of digits; no joint swelling or tenderness to palpation  PSYCH: A+O to person, place, and time; affect appropriate    LABS:                        9.7    12.09 )-----------( 194      ( 18 Dec 2023 05:26 )             30.9     12-18    133<L>  |  91<L>  |  43<H>  ----------------------------<  165<H>  5.7<H>   |  28  |  6.69<H>    Ca    9.7      18 Dec 2023 05:26  Phos  5.7     12-18  Mg     2.2     12-18    TPro  6.6  /  Alb  3.4  /  TBili  0.2  /  DBili  x   /  AST  22  /  ALT  18  /  AlkPhos  74  12-18    PT/INR - ( 17 Dec 2023 14:33 )   PT: 13.1 sec;   INR: 1.20 ratio         PTT - ( 17 Dec 2023 14:33 )  PTT:29.2 sec      Urinalysis Basic - ( 18 Dec 2023 05:26 )    Color: x / Appearance: x / SG: x / pH: x  Gluc: 165 mg/dL / Ketone: x  / Bili: x / Urobili: x   Blood: x / Protein: x / Nitrite: x   Leuk Esterase: x / RBC: x / WBC x   Sq Epi: x / Non Sq Epi: x / Bacteria: x          Tele Reviewed:    RADIOLOGY & ADDITIONAL TESTS:  Results Reviewed:   Imaging Personally Reviewed:  Electrocardiogram Personally Reviewed:     PROGRESS NOTE:   Authored by Dr. Kimberlee Moscoso MD (PGY-1). Pager [facility] [phone] / [place] ( [id number]) or via TEAMS    Patient is a 83y old  Female who presents with a chief complaint of AHRF (17 Dec 2023 19:17)      SUBJECTIVE / OVERNIGHT EVENTS:  No acute events overnight.     ADDITIONAL REVIEW OF SYSTEMS:  Patient denies fevers, chills, chest pain, shortness of breath, nausea, abdominal pain, diarrhea, constipation, dysuria, leg swelling, headache, light headedness.    MEDICATIONS  (STANDING):  albuterol/ipratropium for Nebulization 3 milliLiter(s) Nebulizer every 6 hours  atorvastatin 20 milliGRAM(s) Oral at bedtime  azithromycin  IVPB 500 milliGRAM(s) IV Intermittent every 24 hours  cefTRIAXone   IVPB 1000 milliGRAM(s) IV Intermittent every 24 hours  dextrose 5%. 1000 milliLiter(s) (100 mL/Hr) IV Continuous <Continuous>  dextrose 50% Injectable 25 Gram(s) IV Push once  famotidine    Tablet 20 milliGRAM(s) Oral daily  glucagon  Injectable 1 milliGRAM(s) IntraMuscular once  heparin   Injectable 5000 Unit(s) SubCutaneous every 12 hours  insulin glargine Injectable (LANTUS) 12 Unit(s) SubCutaneous at bedtime  insulin lispro (ADMELOG) corrective regimen sliding scale   SubCutaneous every 6 hours  levothyroxine 75 MICROGram(s) Oral daily  sevelamer carbonate 800 milliGRAM(s) Oral <User Schedule>  sodium zirconium cyclosilicate 5 Gram(s) Oral once    MEDICATIONS  (PRN):      CAPILLARY BLOOD GLUCOSE      POCT Blood Glucose.: 157 mg/dL (18 Dec 2023 06:00)  POCT Blood Glucose.: 239 mg/dL (17 Dec 2023 22:03)    I&O's Summary      PHYSICAL EXAM:  Vital Signs Last 24 Hrs  T(C): 36.3 (18 Dec 2023 07:13), Max: 37.9 (17 Dec 2023 14:01)  T(F): 97.4 (18 Dec 2023 07:13), Max: 100.2 (17 Dec 2023 14:01)  HR: 74 (18 Dec 2023 07:13) (69 - 97)  BP: 103/77 (18 Dec 2023 07:13) (95/69 - 147/76)  BP(mean): 81 (18 Dec 2023 07:13) (59 - 81)  RR: 18 (18 Dec 2023 07:13) (16 - 24)  SpO2: 100% (18 Dec 2023 07:13) (92% - 100%)    Parameters below as of 18 Dec 2023 07:13  Patient On (Oxygen Delivery Method): bipap        CONSTITUTIONAL: NAD, well-developed  RESPIRATORY: Normal respiratory effort; lungs are clear to auscultation bilaterally  CARDIOVASCULAR: Regular rate and rhythm, normal S1 and S2, no murmur/rub/gallop; No lower extremity edema; Peripheral pulses are 2+ bilaterally  ABDOMEN: Nontender to palpation, normoactive bowel sounds, no rebound/guarding; No hepatosplenomegaly  MSK: no clubbing or cyanosis of digits; no joint swelling or tenderness to palpation  PSYCH: A+O to person, place, and time; affect appropriate    LABS:                        9.7    12.09 )-----------( 194      ( 18 Dec 2023 05:26 )             30.9     12-18    133<L>  |  91<L>  |  43<H>  ----------------------------<  165<H>  5.7<H>   |  28  |  6.69<H>    Ca    9.7      18 Dec 2023 05:26  Phos  5.7     12-18  Mg     2.2     12-18    TPro  6.6  /  Alb  3.4  /  TBili  0.2  /  DBili  x   /  AST  22  /  ALT  18  /  AlkPhos  74  12-18    PT/INR - ( 17 Dec 2023 14:33 )   PT: 13.1 sec;   INR: 1.20 ratio         PTT - ( 17 Dec 2023 14:33 )  PTT:29.2 sec      Urinalysis Basic - ( 18 Dec 2023 05:26 )    Color: x / Appearance: x / SG: x / pH: x  Gluc: 165 mg/dL / Ketone: x  / Bili: x / Urobili: x   Blood: x / Protein: x / Nitrite: x   Leuk Esterase: x / RBC: x / WBC x   Sq Epi: x / Non Sq Epi: x / Bacteria: x          Tele Reviewed:    RADIOLOGY & ADDITIONAL TESTS:  Results Reviewed:   Imaging Personally Reviewed:  Electrocardiogram Personally Reviewed:     PROGRESS NOTE:   Authored by Dr. Kimberlee Moscoso MD (PGY-1). Pager [facility] [phone] / [place] ( [id number]) or via TEAMS    Patient is a 83y old  Female who presents with a chief complaint of AHRF (17 Dec 2023 19:17)      SUBJECTIVE / OVERNIGHT EVENTS:  No acute events overnight. Pt weaned from BIPAP to HFNC this AM. Tolerating well, states mild shortness of breath however no CP, abd pain. Sister at bedside, states sx started few days ago with dry cough now productive.       MEDICATIONS  (STANDING):  albuterol/ipratropium for Nebulization 3 milliLiter(s) Nebulizer every 6 hours  atorvastatin 20 milliGRAM(s) Oral at bedtime  azithromycin  IVPB 500 milliGRAM(s) IV Intermittent every 24 hours  cefTRIAXone   IVPB 1000 milliGRAM(s) IV Intermittent every 24 hours  dextrose 5%. 1000 milliLiter(s) (100 mL/Hr) IV Continuous <Continuous>  dextrose 50% Injectable 25 Gram(s) IV Push once  famotidine    Tablet 20 milliGRAM(s) Oral daily  glucagon  Injectable 1 milliGRAM(s) IntraMuscular once  heparin   Injectable 5000 Unit(s) SubCutaneous every 12 hours  insulin glargine Injectable (LANTUS) 12 Unit(s) SubCutaneous at bedtime  insulin lispro (ADMELOG) corrective regimen sliding scale   SubCutaneous every 6 hours  levothyroxine 75 MICROGram(s) Oral daily  sevelamer carbonate 800 milliGRAM(s) Oral <User Schedule>  sodium zirconium cyclosilicate 5 Gram(s) Oral once    MEDICATIONS  (PRN):      CAPILLARY BLOOD GLUCOSE      POCT Blood Glucose.: 157 mg/dL (18 Dec 2023 06:00)  POCT Blood Glucose.: 239 mg/dL (17 Dec 2023 22:03)    I&O's Summary      PHYSICAL EXAM:  Vital Signs Last 24 Hrs  T(C): 36.3 (18 Dec 2023 07:13), Max: 37.9 (17 Dec 2023 14:01)  T(F): 97.4 (18 Dec 2023 07:13), Max: 100.2 (17 Dec 2023 14:01)  HR: 74 (18 Dec 2023 07:13) (69 - 97)  BP: 103/77 (18 Dec 2023 07:13) (95/69 - 147/76)  BP(mean): 81 (18 Dec 2023 07:13) (59 - 81)  RR: 18 (18 Dec 2023 07:13) (16 - 24)  SpO2: 100% (18 Dec 2023 07:13) (92% - 100%)    Parameters below as of 18 Dec 2023 07:13  Patient On (Oxygen Delivery Method): bipap        CONSTITUTIONAL: NAD, well-developed on HFNC   RESPIRATORY: Normal respiratory effort;  coarse breath sounds anteriorly   CARDIOVASCULAR: Regular rate and rhythm, normal S1 and S2, no murmur/rub/gallop; No lower extremity edema; Peripheral pulses are 2+ bilaterally  ABDOMEN: Nontender to palpation, normoactive bowel sounds, no rebound/guarding; No hepatosplenomegaly  MSK: 1+ pitting edema b/l lower extremities   PSYCH: A+O to person, place, and time; affect appropriate    LABS:                        9.7    12.09 )-----------( 194      ( 18 Dec 2023 05:26 )             30.9     12-18    133<L>  |  91<L>  |  43<H>  ----------------------------<  165<H>  5.7<H>   |  28  |  6.69<H>    Ca    9.7      18 Dec 2023 05:26  Phos  5.7     12-18  Mg     2.2     12-18    TPro  6.6  /  Alb  3.4  /  TBili  0.2  /  DBili  x   /  AST  22  /  ALT  18  /  AlkPhos  74  12-18    PT/INR - ( 17 Dec 2023 14:33 )   PT: 13.1 sec;   INR: 1.20 ratio         PTT - ( 17 Dec 2023 14:33 )  PTT:29.2 sec      Urinalysis Basic - ( 18 Dec 2023 05:26 )    Color: x / Appearance: x / SG: x / pH: x  Gluc: 165 mg/dL / Ketone: x  / Bili: x / Urobili: x   Blood: x / Protein: x / Nitrite: x   Leuk Esterase: x / RBC: x / WBC x   Sq Epi: x / Non Sq Epi: x / Bacteria: x          Tele Reviewed:    RADIOLOGY & ADDITIONAL TESTS:  Results Reviewed:   Imaging Personally Reviewed:  Electrocardiogram Personally Reviewed:     PROGRESS NOTE:   Authored by Dr. Kimberlee Moscoso MD (PGY-1). Pager [facility] [phone] / [place] ( [id number]) or via TEAMS    Patient is a 83y old  Female who presents with a chief complaint of AHRF (17 Dec 2023 19:17)      SUBJECTIVE / OVERNIGHT EVENTS:  No acute events overnight. Pt weaned from BIPAP to HFNC this AM. Tolerating well, states mild shortness of breath however no CP, abd pain. Sister at bedside, states sx started few days ago with dry cough now productive.       MEDICATIONS  (STANDING):  albuterol/ipratropium for Nebulization 3 milliLiter(s) Nebulizer every 6 hours  atorvastatin 20 milliGRAM(s) Oral at bedtime  azithromycin  IVPB 500 milliGRAM(s) IV Intermittent every 24 hours  cefTRIAXone   IVPB 1000 milliGRAM(s) IV Intermittent every 24 hours  dextrose 5%. 1000 milliLiter(s) (100 mL/Hr) IV Continuous <Continuous>  dextrose 50% Injectable 25 Gram(s) IV Push once  famotidine    Tablet 20 milliGRAM(s) Oral daily  glucagon  Injectable 1 milliGRAM(s) IntraMuscular once  heparin   Injectable 5000 Unit(s) SubCutaneous every 12 hours  insulin glargine Injectable (LANTUS) 12 Unit(s) SubCutaneous at bedtime  insulin lispro (ADMELOG) corrective regimen sliding scale   SubCutaneous every 6 hours  levothyroxine 75 MICROGram(s) Oral daily  sevelamer carbonate 800 milliGRAM(s) Oral <User Schedule>  sodium zirconium cyclosilicate 5 Gram(s) Oral once    MEDICATIONS  (PRN):      CAPILLARY BLOOD GLUCOSE      POCT Blood Glucose.: 157 mg/dL (18 Dec 2023 06:00)  POCT Blood Glucose.: 239 mg/dL (17 Dec 2023 22:03)    I&O's Summary      PHYSICAL EXAM:  Vital Signs Last 24 Hrs  T(C): 36.3 (18 Dec 2023 07:13), Max: 37.9 (17 Dec 2023 14:01)  T(F): 97.4 (18 Dec 2023 07:13), Max: 100.2 (17 Dec 2023 14:01)  HR: 74 (18 Dec 2023 07:13) (69 - 97)  BP: 103/77 (18 Dec 2023 07:13) (95/69 - 147/76)  BP(mean): 81 (18 Dec 2023 07:13) (59 - 81)  RR: 18 (18 Dec 2023 07:13) (16 - 24)  SpO2: 100% (18 Dec 2023 07:13) (92% - 100%)    Parameters below as of 18 Dec 2023 07:13  Patient On (Oxygen Delivery Method): bipap        CONSTITUTIONAL: NAD, well-developed on HFNC   RESPIRATORY: Normal respiratory effort;  coarse breath sounds anteriorly   CARDIOVASCULAR: Regular rate and rhythm, normal S1 and S2, no murmur/rub/gallop; No lower extremity edema; Peripheral pulses are 2+ bilaterally  ABDOMEN: Nontender to palpation, normoactive bowel sounds, no rebound/guarding; No hepatosplenomegaly  MSK: 1+ pitting edema b/l lower extremities   PSYCH: A+O to person, place, and time; affect appropriate    LABS:                        9.7    12.09 )-----------( 194      ( 18 Dec 2023 05:26 )             30.9     12-18    133<L>  |  91<L>  |  43<H>  ----------------------------<  165<H>  5.7<H>   |  28  |  6.69<H>    Ca    9.7      18 Dec 2023 05:26  Phos  5.7     12-18  Mg     2.2     12-18    TPro  6.6  /  Alb  3.4  /  TBili  0.2  /  DBili  x   /  AST  22  /  ALT  18  /  AlkPhos  74  12-18    PT/INR - ( 17 Dec 2023 14:33 )   PT: 13.1 sec;   INR: 1.20 ratio         PTT - ( 17 Dec 2023 14:33 )  PTT:29.2 sec      Urinalysis Basic - ( 18 Dec 2023 05:26 )    Color: x / Appearance: x / SG: x / pH: x  Gluc: 165 mg/dL / Ketone: x  / Bili: x / Urobili: x   Blood: x / Protein: x / Nitrite: x   Leuk Esterase: x / RBC: x / WBC x   Sq Epi: x / Non Sq Epi: x / Bacteria: x          Tele Reviewed:    RADIOLOGY & ADDITIONAL TESTS:  Results Reviewed:   Imaging Personally Reviewed:  Electrocardiogram Personally Reviewed:

## 2023-12-18 NOTE — PROGRESS NOTE ADULT - PROBLEM SELECTOR PLAN 1
SIRS met w/ tachypnea, leukocytosis  iso RSV+, procal+ w/ CXR c/f bacterial PNA     - CAP tx CTX/azithro while f/u Cx and MRSA/legionella/strep swab/urine  - VS q4h, trend WBC/fever curve   - tx of AHRF as below

## 2023-12-18 NOTE — PHYSICAL THERAPY INITIAL EVALUATION ADULT - ADDITIONAL COMMENTS
Pt lives in a PH with her sisters, +3 steps to enter with handrail. All needs met on main floor. PTA pt was ambulating (I) with R/W and was mostly (I) with ADLs. Owns shower chair commode W/C for longer distances and R/W. Sisters present to assist upon D/C at all times

## 2023-12-18 NOTE — PROGRESS NOTE ADULT - PROBLEM SELECTOR PLAN 4
Elevated troponin to 313 on admission to 317  Chest pain-free, EKG on admission with sinus tach, LAD, no ST segment changes   Low suspicion for ACS at this time, trops likely elevated from ESRD +/- demand of sepsis   - trend trops to peak, c/s cardiology if significant delta   - monitor tele   - TTE eval for WMAs

## 2023-12-18 NOTE — ED ADULT NURSE REASSESSMENT NOTE - NS ED NURSE REASSESS COMMENT FT1
Patient placed on high flow nasal cannula by respiratory therapy. Patient tolerating high flow nasal cannula well. Medicine MD at bedside assessing patient.

## 2023-12-18 NOTE — PHYSICAL THERAPY INITIAL EVALUATION ADULT - GAIT DEVIATIONS NOTED, PT EVAL
decreased basim/increased time in double stance/decreased step length/decreased stride length/decreased weight-shifting ability

## 2023-12-18 NOTE — ED ADULT NURSE REASSESSMENT NOTE - NS ED NURSE REASSESS COMMENT FT1
RT contacted at #[phone] to obtain AM abg. As per RT, RT to come to bedside to collect abg. RT contacted at #[phone] to obtain AM abg. As per RT, RT to come to bedside to collect abg.

## 2023-12-18 NOTE — PHYSICAL THERAPY INITIAL EVALUATION ADULT - PERTINENT HX OF CURRENT PROBLEM, REHAB EVAL
Pt is a 83F admitted to [facility] on 12/17/23 hx R breast CA (s/p mastectomy), ESRD on HD Mon/Tues/Thurs/Fri, HLD, T2DM, hypothyroidism, presenting with productive cough and SOB for about 1 week. Pt accompanied by family who is assisting in history. Family reports pt had a cough that was initially dry and eventually became productive last few days. She has mild MCLEOD at baseline, but it has worsened during this time. She had fevers at home with Tmax 101 this AM. She was seen by her PCP earlier this month and had CXR which showed no consolidation. Was given doxycycline on Friday with minimal improvement.  Of note recently returned from [place], [place]. Hospital course: On arrival to ED, HDS, Tmax 100.2, SpO2 100% on 8L NRB. She subsequently had worsening WOB, so was transitioned to BiPAP. Initial workup remarkable for ABG 7.33/58/139/1.9 on BiPAP. +RSV. CXR with R perihilar opaciity. Given ceftriaxone, azithromycin, 40 mg solumedrol, and duoneb x 2. Admitted to medicine for further workup. +admitted for AHRF requiring BiPAP, likely secondary to RSV with possible superimposed PNA vs. volume overload. SIRS met w/ tachypnea, leukocytosis, iso RSV+, procal+ w/ CXR c/f bacterial PNA. Likely 2/2  +RSV with possible superimposed PNA with thick secretions, small component of volume overload, Elevated troponin to 313 on admission to [facility], Chest pain-free, EKG on admission with sinus tach, LAD, no ST segment changes , Low suspicion for ACS at this time, trops likely elevated from ESRD +/- demand of sepsis, DVT ppx: SQH, MICU c/s, not requiring ICU and no urgent recs, CXR: Right perihilar hazy opacity, similar to prior imaging. bipap: Current settings; 10/5, 40% Pt is a 83F admitted to [facility] on 12/17/23 hx R breast CA (s/p mastectomy), ESRD on HD Mon/Tues/Thurs/Fri, HLD, T2DM, hypothyroidism, presenting with productive cough and SOB for about 1 week. Pt accompanied by family who is assisting in history. Family reports pt had a cough that was initially dry and eventually became productive last few days. She has mild MCLEOD at baseline, but it has worsened during this time. She had fevers at home with Tmax 101 this AM. She was seen by her PCP earlier this month and had CXR which showed no consolidation. Was given doxycycline on Friday with minimal improvement.  Of note recently returned from [place], [place]. Hospital course: On arrival to ED, HDS, Tmax 100.2, SpO2 100% on 8L NRB. She subsequently had worsening WOB, so was transitioned to BiPAP. Initial workup remarkable for ABG 7.33/58/139/1.9 on BiPAP. +RSV. CXR with R perihilar opaciity. Given ceftriaxone, azithromycin, 40 mg solumedrol, and duoneb x 2. Admitted to medicine for further workup. +admitted for AHRF requiring BiPAP, likely secondary to RSV with possible superimposed PNA vs. volume overload. SIRS met w/ tachypnea, leukocytosis, iso RSV+, procal+ w/ CXR c/f bacterial PNA. Likely 2/2  +RSV with possible superimposed PNA with thick secretions, small component of volume overload, Elevated troponin to 313 on admission to [facility], Chest pain-free, EKG on admission with sinus tach, LAD, no ST segment changes , Low suspicion for ACS at this time, trops likely elevated from ESRD +/- demand of sepsis, DVT ppx: SQH, MICU c/s, not requiring ICU and no urgent recs, CXR: Right perihilar hazy opacity, similar to prior imaging. bipap: Current settings; 10/5, 40%

## 2023-12-18 NOTE — ED ADULT NURSE REASSESSMENT NOTE - NS ED NURSE REASSESS COMMENT FT1
resting in bed with BiPAP in use; no acute distress; family at bedside; unable to obtain urine spec at this time.

## 2023-12-18 NOTE — PROGRESS NOTE ADULT - PROBLEM SELECTOR PLAN 2
Likely 2/2  +RSV with possible superimposed PNA with thick secretions, small component of volume overload  Requiring new BiPAP 10/5 40% FiO2 in ED  BNP elevated however more likely falsely elevated iso ESRD, less likely ADHF    - c/w ceftriaxone (12/17-12/21) and azithromycin (12/17-12/19), narrow per Cx, MRSA swab, Ulegionella/strep   - duonebs q6h  - hypertonic saline nebs for mobilization of secretions   - plan for iHD 12/18 with 2-3L UF  - f/u TTE  - current gas on BiPAP, will rpt gas in AM, consider wean BiPAP at that time Likely 2/2  +RSV with possible superimposed PNA with thick secretions, small component of volume overload  Requiring new BiPAP 10/5 40% FiO2 in ED  - transitioned to HFNC 12/18 AM  BNP elevated however more likely falsely elevated iso ESRD, less likely ADHF    - c/w ceftriaxone (12/17-12/21) and azithromycin (12/17-12/19), narrow per Cx, MRSA swab, Ulegionella/strep   - duonebs q6h  - hypertonic saline nebs for mobilization of secretions   - plan for iHD 12/18 with 2-3L UF  - f/u TTE

## 2023-12-18 NOTE — PROGRESS NOTE ADULT - ASSESSMENT
83F ESRD on HD Mon/Tues/Thurs/Fri, HLD, T2DM, hypothyroidism, presenting with productive cough and SOB for about 1 week, admitted for AHRF requiring BiPAP, likely secondary to RSV with possible superimposed PNA vs. volume overload.

## 2023-12-19 ENCOUNTER — APPOINTMENT (OUTPATIENT)
Dept: RADIOLOGY | Facility: CLINIC | Age: 83
End: 2023-12-19

## 2023-12-19 LAB
ALBUMIN SERPL ELPH-MCNC: 3.6 G/DL — SIGNIFICANT CHANGE UP (ref 3.3–5)
ALBUMIN SERPL ELPH-MCNC: 3.6 G/DL — SIGNIFICANT CHANGE UP (ref 3.3–5)
ALP SERPL-CCNC: 81 U/L — SIGNIFICANT CHANGE UP (ref 40–120)
ALP SERPL-CCNC: 81 U/L — SIGNIFICANT CHANGE UP (ref 40–120)
ALT FLD-CCNC: 21 U/L — SIGNIFICANT CHANGE UP (ref 10–45)
ALT FLD-CCNC: 21 U/L — SIGNIFICANT CHANGE UP (ref 10–45)
ANION GAP SERPL CALC-SCNC: 11 MMOL/L — SIGNIFICANT CHANGE UP (ref 5–17)
ANION GAP SERPL CALC-SCNC: 11 MMOL/L — SIGNIFICANT CHANGE UP (ref 5–17)
AST SERPL-CCNC: 26 U/L — SIGNIFICANT CHANGE UP (ref 10–40)
AST SERPL-CCNC: 26 U/L — SIGNIFICANT CHANGE UP (ref 10–40)
BASE EXCESS BLDV CALC-SCNC: 5.4 MMOL/L — HIGH (ref -2–3)
BASE EXCESS BLDV CALC-SCNC: 5.4 MMOL/L — HIGH (ref -2–3)
BILIRUB SERPL-MCNC: 0.2 MG/DL — SIGNIFICANT CHANGE UP (ref 0.2–1.2)
BILIRUB SERPL-MCNC: 0.2 MG/DL — SIGNIFICANT CHANGE UP (ref 0.2–1.2)
BUN SERPL-MCNC: 25 MG/DL — HIGH (ref 7–23)
BUN SERPL-MCNC: 25 MG/DL — HIGH (ref 7–23)
CA-I SERPL-SCNC: 1.19 MMOL/L — SIGNIFICANT CHANGE UP (ref 1.15–1.33)
CA-I SERPL-SCNC: 1.19 MMOL/L — SIGNIFICANT CHANGE UP (ref 1.15–1.33)
CALCIUM SERPL-MCNC: 9.3 MG/DL — SIGNIFICANT CHANGE UP (ref 8.4–10.5)
CALCIUM SERPL-MCNC: 9.3 MG/DL — SIGNIFICANT CHANGE UP (ref 8.4–10.5)
CHLORIDE BLDV-SCNC: 93 MMOL/L — LOW (ref 96–108)
CHLORIDE BLDV-SCNC: 93 MMOL/L — LOW (ref 96–108)
CHLORIDE SERPL-SCNC: 91 MMOL/L — LOW (ref 96–108)
CHLORIDE SERPL-SCNC: 91 MMOL/L — LOW (ref 96–108)
CO2 BLDV-SCNC: 36 MMOL/L — HIGH (ref 22–26)
CO2 BLDV-SCNC: 36 MMOL/L — HIGH (ref 22–26)
CO2 SERPL-SCNC: 31 MMOL/L — SIGNIFICANT CHANGE UP (ref 22–31)
CO2 SERPL-SCNC: 31 MMOL/L — SIGNIFICANT CHANGE UP (ref 22–31)
CREAT SERPL-MCNC: 3.91 MG/DL — HIGH (ref 0.5–1.3)
CREAT SERPL-MCNC: 3.91 MG/DL — HIGH (ref 0.5–1.3)
EGFR: 11 ML/MIN/1.73M2 — LOW
EGFR: 11 ML/MIN/1.73M2 — LOW
GAS PNL BLDA: SIGNIFICANT CHANGE UP
GAS PNL BLDA: SIGNIFICANT CHANGE UP
GAS PNL BLDV: 131 MMOL/L — LOW (ref 136–145)
GAS PNL BLDV: 131 MMOL/L — LOW (ref 136–145)
GAS PNL BLDV: SIGNIFICANT CHANGE UP
GLUCOSE BLDC GLUCOMTR-MCNC: 106 MG/DL — HIGH (ref 70–99)
GLUCOSE BLDC GLUCOMTR-MCNC: 106 MG/DL — HIGH (ref 70–99)
GLUCOSE BLDC GLUCOMTR-MCNC: 110 MG/DL — HIGH (ref 70–99)
GLUCOSE BLDC GLUCOMTR-MCNC: 110 MG/DL — HIGH (ref 70–99)
GLUCOSE BLDC GLUCOMTR-MCNC: 176 MG/DL — HIGH (ref 70–99)
GLUCOSE BLDC GLUCOMTR-MCNC: 176 MG/DL — HIGH (ref 70–99)
GLUCOSE BLDC GLUCOMTR-MCNC: 211 MG/DL — HIGH (ref 70–99)
GLUCOSE BLDC GLUCOMTR-MCNC: 211 MG/DL — HIGH (ref 70–99)
GLUCOSE BLDC GLUCOMTR-MCNC: 82 MG/DL — SIGNIFICANT CHANGE UP (ref 70–99)
GLUCOSE BLDC GLUCOMTR-MCNC: 82 MG/DL — SIGNIFICANT CHANGE UP (ref 70–99)
GLUCOSE BLDV-MCNC: 81 MG/DL — SIGNIFICANT CHANGE UP (ref 70–99)
GLUCOSE BLDV-MCNC: 81 MG/DL — SIGNIFICANT CHANGE UP (ref 70–99)
GLUCOSE SERPL-MCNC: 84 MG/DL — SIGNIFICANT CHANGE UP (ref 70–99)
GLUCOSE SERPL-MCNC: 84 MG/DL — SIGNIFICANT CHANGE UP (ref 70–99)
HCO3 BLDV-SCNC: 34 MMOL/L — HIGH (ref 22–29)
HCO3 BLDV-SCNC: 34 MMOL/L — HIGH (ref 22–29)
HCT VFR BLD CALC: 31.8 % — LOW (ref 34.5–45)
HCT VFR BLD CALC: 31.8 % — LOW (ref 34.5–45)
HCT VFR BLDA CALC: 32 % — LOW (ref 34.5–46.5)
HCT VFR BLDA CALC: 32 % — LOW (ref 34.5–46.5)
HGB BLD CALC-MCNC: 10.8 G/DL — LOW (ref 11.7–16.1)
HGB BLD CALC-MCNC: 10.8 G/DL — LOW (ref 11.7–16.1)
HGB BLD-MCNC: 10.4 G/DL — LOW (ref 11.5–15.5)
HGB BLD-MCNC: 10.4 G/DL — LOW (ref 11.5–15.5)
LACTATE BLDV-MCNC: 1.5 MMOL/L — SIGNIFICANT CHANGE UP (ref 0.5–2)
LACTATE BLDV-MCNC: 1.5 MMOL/L — SIGNIFICANT CHANGE UP (ref 0.5–2)
MAGNESIUM SERPL-MCNC: 2.1 MG/DL — SIGNIFICANT CHANGE UP (ref 1.6–2.6)
MAGNESIUM SERPL-MCNC: 2.1 MG/DL — SIGNIFICANT CHANGE UP (ref 1.6–2.6)
MCHC RBC-ENTMCNC: 32.7 GM/DL — SIGNIFICANT CHANGE UP (ref 32–36)
MCHC RBC-ENTMCNC: 32.7 GM/DL — SIGNIFICANT CHANGE UP (ref 32–36)
MCHC RBC-ENTMCNC: 34 PG — SIGNIFICANT CHANGE UP (ref 27–34)
MCHC RBC-ENTMCNC: 34 PG — SIGNIFICANT CHANGE UP (ref 27–34)
MCV RBC AUTO: 103.9 FL — HIGH (ref 80–100)
MCV RBC AUTO: 103.9 FL — HIGH (ref 80–100)
NRBC # BLD: 0 /100 WBCS — SIGNIFICANT CHANGE UP (ref 0–0)
NRBC # BLD: 0 /100 WBCS — SIGNIFICANT CHANGE UP (ref 0–0)
PCO2 BLDV: 70 MMHG — HIGH (ref 39–42)
PCO2 BLDV: 70 MMHG — HIGH (ref 39–42)
PH BLDV: 7.29 — LOW (ref 7.32–7.43)
PH BLDV: 7.29 — LOW (ref 7.32–7.43)
PHOSPHATE SERPL-MCNC: 3.8 MG/DL — SIGNIFICANT CHANGE UP (ref 2.5–4.5)
PHOSPHATE SERPL-MCNC: 3.8 MG/DL — SIGNIFICANT CHANGE UP (ref 2.5–4.5)
PLATELET # BLD AUTO: 214 K/UL — SIGNIFICANT CHANGE UP (ref 150–400)
PLATELET # BLD AUTO: 214 K/UL — SIGNIFICANT CHANGE UP (ref 150–400)
PO2 BLDV: 52 MMHG — HIGH (ref 25–45)
PO2 BLDV: 52 MMHG — HIGH (ref 25–45)
POTASSIUM BLDV-SCNC: 4.1 MMOL/L — SIGNIFICANT CHANGE UP (ref 3.5–5.1)
POTASSIUM BLDV-SCNC: 4.1 MMOL/L — SIGNIFICANT CHANGE UP (ref 3.5–5.1)
POTASSIUM SERPL-MCNC: 4.2 MMOL/L — SIGNIFICANT CHANGE UP (ref 3.5–5.3)
POTASSIUM SERPL-MCNC: 4.2 MMOL/L — SIGNIFICANT CHANGE UP (ref 3.5–5.3)
POTASSIUM SERPL-SCNC: 4.2 MMOL/L — SIGNIFICANT CHANGE UP (ref 3.5–5.3)
POTASSIUM SERPL-SCNC: 4.2 MMOL/L — SIGNIFICANT CHANGE UP (ref 3.5–5.3)
PROT SERPL-MCNC: 6.9 G/DL — SIGNIFICANT CHANGE UP (ref 6–8.3)
PROT SERPL-MCNC: 6.9 G/DL — SIGNIFICANT CHANGE UP (ref 6–8.3)
RBC # BLD: 3.06 M/UL — LOW (ref 3.8–5.2)
RBC # BLD: 3.06 M/UL — LOW (ref 3.8–5.2)
RBC # FLD: 13.4 % — SIGNIFICANT CHANGE UP (ref 10.3–14.5)
RBC # FLD: 13.4 % — SIGNIFICANT CHANGE UP (ref 10.3–14.5)
SAO2 % BLDV: 83.2 % — SIGNIFICANT CHANGE UP (ref 67–88)
SAO2 % BLDV: 83.2 % — SIGNIFICANT CHANGE UP (ref 67–88)
SODIUM SERPL-SCNC: 133 MMOL/L — LOW (ref 135–145)
SODIUM SERPL-SCNC: 133 MMOL/L — LOW (ref 135–145)
TROPONIN T, HIGH SENSITIVITY RESULT: 37 NG/L — SIGNIFICANT CHANGE UP (ref 0–51)
TROPONIN T, HIGH SENSITIVITY RESULT: 37 NG/L — SIGNIFICANT CHANGE UP (ref 0–51)
WBC # BLD: 9.52 K/UL — SIGNIFICANT CHANGE UP (ref 3.8–10.5)
WBC # BLD: 9.52 K/UL — SIGNIFICANT CHANGE UP (ref 3.8–10.5)
WBC # FLD AUTO: 9.52 K/UL — SIGNIFICANT CHANGE UP (ref 3.8–10.5)
WBC # FLD AUTO: 9.52 K/UL — SIGNIFICANT CHANGE UP (ref 3.8–10.5)

## 2023-12-19 PROCEDURE — 93306 TTE W/DOPPLER COMPLETE: CPT | Mod: 26

## 2023-12-19 RX ORDER — INFLUENZA VIRUS VACCINE 15; 15; 15; 15 UG/.5ML; UG/.5ML; UG/.5ML; UG/.5ML
0.7 SUSPENSION INTRAMUSCULAR ONCE
Refills: 0 | Status: DISCONTINUED | OUTPATIENT
Start: 2023-12-19 | End: 2023-12-22

## 2023-12-19 RX ADMIN — MUPIROCIN 1 APPLICATION(S): 20 OINTMENT TOPICAL at 18:26

## 2023-12-19 RX ADMIN — Medication 100 MILLIGRAM(S): at 06:08

## 2023-12-19 RX ADMIN — Medication 100 MILLIGRAM(S): at 02:47

## 2023-12-19 RX ADMIN — FAMOTIDINE 20 MILLIGRAM(S): 10 INJECTION INTRAVENOUS at 12:32

## 2023-12-19 RX ADMIN — Medication 3 MILLILITER(S): at 23:09

## 2023-12-19 RX ADMIN — AZITHROMYCIN 255 MILLIGRAM(S): 500 TABLET, FILM COATED ORAL at 23:08

## 2023-12-19 RX ADMIN — MUPIROCIN 1 APPLICATION(S): 20 OINTMENT TOPICAL at 06:07

## 2023-12-19 RX ADMIN — Medication 3 MILLILITER(S): at 06:08

## 2023-12-19 RX ADMIN — HEPARIN SODIUM 5000 UNIT(S): 5000 INJECTION INTRAVENOUS; SUBCUTANEOUS at 06:07

## 2023-12-19 RX ADMIN — Medication 100 MILLIGRAM(S): at 15:18

## 2023-12-19 RX ADMIN — Medication 3 MILLILITER(S): at 18:23

## 2023-12-19 RX ADMIN — Medication 3 MILLILITER(S): at 12:33

## 2023-12-19 RX ADMIN — Medication 75 MICROGRAM(S): at 06:08

## 2023-12-19 RX ADMIN — ATORVASTATIN CALCIUM 20 MILLIGRAM(S): 80 TABLET, FILM COATED ORAL at 23:08

## 2023-12-19 RX ADMIN — Medication 100 MILLIGRAM(S): at 23:07

## 2023-12-19 RX ADMIN — CEFTRIAXONE 100 MILLIGRAM(S): 500 INJECTION, POWDER, FOR SOLUTION INTRAMUSCULAR; INTRAVENOUS at 23:08

## 2023-12-19 RX ADMIN — Medication 2: at 00:38

## 2023-12-19 RX ADMIN — INSULIN GLARGINE 12 UNIT(S): 100 INJECTION, SOLUTION SUBCUTANEOUS at 23:09

## 2023-12-19 RX ADMIN — HEPARIN SODIUM 5000 UNIT(S): 5000 INJECTION INTRAVENOUS; SUBCUTANEOUS at 18:23

## 2023-12-19 RX ADMIN — SEVELAMER CARBONATE 800 MILLIGRAM(S): 2400 POWDER, FOR SUSPENSION ORAL at 12:32

## 2023-12-19 NOTE — PROGRESS NOTE ADULT - PROBLEM SELECTOR PLAN 1
SIRS met w/ tachypnea, leukocytosis  iso RSV+, procal+ w/ CXR c/f bacterial PNA     - CAP tx CTX/azithro while f/u Cx and MRSA/legionella/strep swab/urine  - VS q4h, trend WBC/fever curve   - tx of AHRF as below SIRS met w/ tachypnea, leukocytosis  iso RSV+, procal+ w/ CXR c/f bacterial PNA     - CAP tx CTX/azithro empiric course 5 days ceftriaxone 12/17- 21  - azithromycin 12/18- 12/20   - VS q4h, trend WBC/fever curve   - tx of AHRF as below

## 2023-12-19 NOTE — PROGRESS NOTE ADULT - SUBJECTIVE AND OBJECTIVE BOX
PROGRESS NOTE:   Authored by Dr. Kimberlee Moscoso MD (PGY-1). Pager [facility] [phone] / [place] ( [id number]) or via TEAMS    Patient is a 83y old  Female who presents with a chief complaint of AHRF (18 Dec 2023 07:45)      SUBJECTIVE / OVERNIGHT EVENTS:  No acute events overnight.     ADDITIONAL REVIEW OF SYSTEMS:  Patient denies fevers, chills, chest pain, shortness of breath, nausea, abdominal pain, diarrhea, constipation, dysuria, leg swelling, headache, light headedness.    MEDICATIONS  (STANDING):  albuterol/ipratropium for Nebulization 3 milliLiter(s) Nebulizer every 6 hours  atorvastatin 20 milliGRAM(s) Oral at bedtime  azithromycin  IVPB 500 milliGRAM(s) IV Intermittent every 24 hours  benzonatate 100 milliGRAM(s) Oral three times a day  cefTRIAXone   IVPB 1000 milliGRAM(s) IV Intermittent every 24 hours  dextrose 5%. 1000 milliLiter(s) (100 mL/Hr) IV Continuous <Continuous>  dextrose 50% Injectable 25 Gram(s) IV Push once  famotidine    Tablet 20 milliGRAM(s) Oral daily  glucagon  Injectable 1 milliGRAM(s) IntraMuscular once  heparin   Injectable 5000 Unit(s) SubCutaneous every 12 hours  influenza  Vaccine (HIGH DOSE) 0.7 milliLiter(s) IntraMuscular once  insulin glargine Injectable (LANTUS) 12 Unit(s) SubCutaneous at bedtime  insulin lispro (ADMELOG) corrective regimen sliding scale   SubCutaneous every 6 hours  levothyroxine 75 MICROGram(s) Oral daily  mupirocin 2% Nasal 1 Application(s) Both Nostrils two times a day  sevelamer carbonate 800 milliGRAM(s) Oral <User Schedule>    MEDICATIONS  (PRN):  guaiFENesin Oral Liquid (Sugar-Free) 100 milliGRAM(s) Oral every 6 hours PRN Cough      CAPILLARY BLOOD GLUCOSE      POCT Blood Glucose.: 82 mg/dL (19 Dec 2023 06:23)  POCT Blood Glucose.: 211 mg/dL (19 Dec 2023 00:14)  POCT Blood Glucose.: 178 mg/dL (18 Dec 2023 22:40)  POCT Blood Glucose.: 98 mg/dL (18 Dec 2023 18:48)  POCT Blood Glucose.: 106 mg/dL (18 Dec 2023 11:23)    I&O's Summary    18 Dec 2023 07:01  -  19 Dec 2023 07:00  --------------------------------------------------------  IN: 0 mL / OUT: 2000 mL / NET: -2000 mL        PHYSICAL EXAM:  Vital Signs Last 24 Hrs  T(C): 36.9 (19 Dec 2023 06:29), Max: 37.2 (18 Dec 2023 21:05)  T(F): 98.4 (19 Dec 2023 06:29), Max: 99 (18 Dec 2023 21:05)  HR: 90 (19 Dec 2023 06:29) (74 - 95)  BP: 133/65 (19 Dec 2023 06:29) (102/65 - 142/60)  BP(mean): 65 (18 Dec 2023 10:22) (65 - 81)  RR: 18 (19 Dec 2023 06:29) (17 - 20)  SpO2: 98% (19 Dec 2023 06:29) (98% - 100%)    Parameters below as of 19 Dec 2023 06:29  Patient On (Oxygen Delivery Method): nasal cannula  O2 Flow (L/min): 3      CONSTITUTIONAL: NAD, well-developed  RESPIRATORY: Normal respiratory effort; lungs are clear to auscultation bilaterally  CARDIOVASCULAR: Regular rate and rhythm, normal S1 and S2, no murmur/rub/gallop; No lower extremity edema; Peripheral pulses are 2+ bilaterally  ABDOMEN: Nontender to palpation, normoactive bowel sounds, no rebound/guarding; No hepatosplenomegaly  MSK: no clubbing or cyanosis of digits; no joint swelling or tenderness to palpation  PSYCH: A+O to person, place, and time; affect appropriate    LABS:                        9.7    12.09 )-----------( 194      ( 18 Dec 2023 05:26 )             30.9     12-18    133<L>  |  91<L>  |  43<H>  ----------------------------<  165<H>  5.7<H>   |  28  |  6.69<H>    Ca    9.7      18 Dec 2023 05:26  Phos  5.7     12-18  Mg     2.2     12-18    TPro  6.6  /  Alb  3.4  /  TBili  0.2  /  DBili  x   /  AST  22  /  ALT  18  /  AlkPhos  74  12-18    PT/INR - ( 17 Dec 2023 14:33 )   PT: 13.1 sec;   INR: 1.20 ratio         PTT - ( 17 Dec 2023 14:33 )  PTT:29.2 sec      Urinalysis Basic - ( 18 Dec 2023 05:26 )    Color: x / Appearance: x / SG: x / pH: x  Gluc: 165 mg/dL / Ketone: x  / Bili: x / Urobili: x   Blood: x / Protein: x / Nitrite: x   Leuk Esterase: x / RBC: x / WBC x   Sq Epi: x / Non Sq Epi: x / Bacteria: x        Culture - Blood (collected 17 Dec 2023 14:23)  Source: .Blood Blood-Peripheral  Preliminary Report (18 Dec 2023 20:02):    No growth at 24 hours    Culture - Blood (collected 17 Dec 2023 14:20)  Source: .Blood Blood-Peripheral  Preliminary Report (18 Dec 2023 20:02):    No growth at 24 hours        Tele Reviewed:    RADIOLOGY & ADDITIONAL TESTS:  Results Reviewed:   Imaging Personally Reviewed:  Electrocardiogram Personally Reviewed:     PROGRESS NOTE:   Authored by Dr. Kimberlee Moscoso MD (PGY-1). Pager [facility] [phone] / [place] ( [id number]) or via TEAMS    Patient is a 83y old  Female who presents with a chief complaint of AHRF (18 Dec 2023 07:45)      SUBJECTIVE / OVERNIGHT EVENTS:  No acute events overnight.     ADDITIONAL REVIEW OF SYSTEMS:  Patient denies fevers, chills, chest pain, shortness of breath, nausea, abdominal pain, diarrhea, constipation, dysuria, leg swelling, headache, light headedness.    MEDICATIONS  (STANDING):  albuterol/ipratropium for Nebulization 3 milliLiter(s) Nebulizer every 6 hours  atorvastatin 20 milliGRAM(s) Oral at bedtime  azithromycin  IVPB 500 milliGRAM(s) IV Intermittent every 24 hours  benzonatate 100 milliGRAM(s) Oral three times a day  cefTRIAXone   IVPB 1000 milliGRAM(s) IV Intermittent every 24 hours  dextrose 5%. 1000 milliLiter(s) (100 mL/Hr) IV Continuous <Continuous>  dextrose 50% Injectable 25 Gram(s) IV Push once  famotidine    Tablet 20 milliGRAM(s) Oral daily  glucagon  Injectable 1 milliGRAM(s) IntraMuscular once  heparin   Injectable 5000 Unit(s) SubCutaneous every 12 hours  influenza  Vaccine (HIGH DOSE) 0.7 milliLiter(s) IntraMuscular once  insulin glargine Injectable (LANTUS) 12 Unit(s) SubCutaneous at bedtime  insulin lispro (ADMELOG) corrective regimen sliding scale   SubCutaneous every 6 hours  levothyroxine 75 MICROGram(s) Oral daily  mupirocin 2% Nasal 1 Application(s) Both Nostrils two times a day  sevelamer carbonate 800 milliGRAM(s) Oral <User Schedule>    MEDICATIONS  (PRN):  guaiFENesin Oral Liquid (Sugar-Free) 100 milliGRAM(s) Oral every 6 hours PRN Cough      CAPILLARY BLOOD GLUCOSE      POCT Blood Glucose.: 82 mg/dL (19 Dec 2023 06:23)  POCT Blood Glucose.: 211 mg/dL (19 Dec 2023 00:14)  POCT Blood Glucose.: 178 mg/dL (18 Dec 2023 22:40)  POCT Blood Glucose.: 98 mg/dL (18 Dec 2023 18:48)  POCT Blood Glucose.: 106 mg/dL (18 Dec 2023 11:23)    I&O's Summary    18 Dec 2023 07:01  -  19 Dec 2023 07:00  --------------------------------------------------------  IN: 0 mL / OUT: 2000 mL / NET: -2000 mL        PHYSICAL EXAM:  Vital Signs Last 24 Hrs  T(C): 36.9 (19 Dec 2023 06:29), Max: 37.2 (18 Dec 2023 21:05)  T(F): 98.4 (19 Dec 2023 06:29), Max: 99 (18 Dec 2023 21:05)  HR: 90 (19 Dec 2023 06:29) (74 - 95)  BP: 133/65 (19 Dec 2023 06:29) (102/65 - 142/60)  BP(mean): 65 (18 Dec 2023 10:22) (65 - 81)  RR: 18 (19 Dec 2023 06:29) (17 - 20)  SpO2: 98% (19 Dec 2023 06:29) (98% - 100%)    Parameters below as of 19 Dec 2023 06:29  Patient On (Oxygen Delivery Method): nasal cannula  O2 Flow (L/min): 3      CONSTITUTIONAL: NAD, well-developed  RESPIRATORY: Normal respiratory effort; lungs are clear to auscultation bilaterally  CARDIOVASCULAR: Regular rate and rhythm, normal S1 and S2, no murmur/rub/gallop; No lower extremity edema; Peripheral pulses are 2+ bilaterally  ABDOMEN: Nontender to palpation, normoactive bowel sounds, no rebound/guarding; No hepatosplenomegaly  MSK: no clubbing or cyanosis of digits; no joint swelling or tenderness to palpation  PSYCH: A+O to person, place, and time; affect appropriate    LABS:                        9.7    12.09 )-----------( 194      ( 18 Dec 2023 05:26 )             30.9     12-18    133<L>  |  91<L>  |  43<H>  ----------------------------<  165<H>  5.7<H>   |  28  |  6.69<H>    Ca    9.7      18 Dec 2023 05:26  Phos  5.7     12-18  Mg     2.2     12-18    TPro  6.6  /  Alb  3.4  /  TBili  0.2  /  DBili  x   /  AST  22  /  ALT  18  /  AlkPhos  74  12-18    PT/INR - ( 17 Dec 2023 14:33 )   PT: 13.1 sec;   INR: 1.20 ratio         PTT - ( 17 Dec 2023 14:33 )  PTT:29.2 sec      Urinalysis Basic - ( 18 Dec 2023 05:26 )    Color: x / Appearance: x / SG: x / pH: x  Gluc: 165 mg/dL / Ketone: x  / Bili: x / Urobili: x   Blood: x / Protein: x / Nitrite: x   Leuk Esterase: x / RBC: x / WBC x   Sq Epi: x / Non Sq Epi: x / Bacteria: x        Culture - Blood (collected 17 Dec 2023 14:23)  Source: .Blood Blood-Peripheral  Preliminary Report (18 Dec 2023 20:02):    No growth at 24 hours    Culture - Blood (collected 17 Dec 2023 14:20)  Source: .Blood Blood-Peripheral  Preliminary Report (18 Dec 2023 20:02):    No growth at 24 hours        Tele Reviewed:    RADIOLOGY & ADDITIONAL TESTS:  Results Reviewed:   Imaging Personally Reviewed:  Electrocardiogram Personally Reviewed:     PROGRESS NOTE:   Authored by Dr. Kimberlee Moscoso MD (PGY-1). Pager [facility] [phone] / [place] ( [id number]) or via TEAMS    Patient is a 83y old  Female who presents with a chief complaint of AHRF (18 Dec 2023 07:45)      SUBJECTIVE / OVERNIGHT EVENTS:  No acute events overnight. Pt states wasn't able to get good restful sleep. Improvement in SOB after dialysis. On NC. Denies CP, abd pain.       MEDICATIONS  (STANDING):  albuterol/ipratropium for Nebulization 3 milliLiter(s) Nebulizer every 6 hours  atorvastatin 20 milliGRAM(s) Oral at bedtime  azithromycin  IVPB 500 milliGRAM(s) IV Intermittent every 24 hours  benzonatate 100 milliGRAM(s) Oral three times a day  cefTRIAXone   IVPB 1000 milliGRAM(s) IV Intermittent every 24 hours  dextrose 5%. 1000 milliLiter(s) (100 mL/Hr) IV Continuous <Continuous>  dextrose 50% Injectable 25 Gram(s) IV Push once  famotidine    Tablet 20 milliGRAM(s) Oral daily  glucagon  Injectable 1 milliGRAM(s) IntraMuscular once  heparin   Injectable 5000 Unit(s) SubCutaneous every 12 hours  influenza  Vaccine (HIGH DOSE) 0.7 milliLiter(s) IntraMuscular once  insulin glargine Injectable (LANTUS) 12 Unit(s) SubCutaneous at bedtime  insulin lispro (ADMELOG) corrective regimen sliding scale   SubCutaneous every 6 hours  levothyroxine 75 MICROGram(s) Oral daily  mupirocin 2% Nasal 1 Application(s) Both Nostrils two times a day  sevelamer carbonate 800 milliGRAM(s) Oral <User Schedule>    MEDICATIONS  (PRN):  guaiFENesin Oral Liquid (Sugar-Free) 100 milliGRAM(s) Oral every 6 hours PRN Cough      CAPILLARY BLOOD GLUCOSE      POCT Blood Glucose.: 82 mg/dL (19 Dec 2023 06:23)  POCT Blood Glucose.: 211 mg/dL (19 Dec 2023 00:14)  POCT Blood Glucose.: 178 mg/dL (18 Dec 2023 22:40)  POCT Blood Glucose.: 98 mg/dL (18 Dec 2023 18:48)  POCT Blood Glucose.: 106 mg/dL (18 Dec 2023 11:23)    I&O's Summary    18 Dec 2023 07:01  -  19 Dec 2023 07:00  --------------------------------------------------------  IN: 0 mL / OUT: 2000 mL / NET: -2000 mL        PHYSICAL EXAM:  Vital Signs Last 24 Hrs  T(C): 36.9 (19 Dec 2023 06:29), Max: 37.2 (18 Dec 2023 21:05)  T(F): 98.4 (19 Dec 2023 06:29), Max: 99 (18 Dec 2023 21:05)  HR: 90 (19 Dec 2023 06:29) (74 - 95)  BP: 133/65 (19 Dec 2023 06:29) (102/65 - 142/60)  BP(mean): 65 (18 Dec 2023 10:22) (65 - 81)  RR: 18 (19 Dec 2023 06:29) (17 - 20)  SpO2: 98% (19 Dec 2023 06:29) (98% - 100%)    Parameters below as of 19 Dec 2023 06:29  Patient On (Oxygen Delivery Method): nasal cannula  O2 Flow (L/min): 3      CONSTITUTIONAL: NAD, well-developed obese female on NC   RESPIRATORY: Normal respiratory effort; scattered wheezing no crackles or rales   CARDIOVASCULAR: Regular rate and rhythm, normal S1 and S2, no murmur/rub/gallop; No lower extremity edema; Peripheral pulses are 2+ bilaterally  ABDOMEN: Nontender to palpation, normoactive bowel sounds, no rebound/guarding; No hepatosplenomegaly  MSK: no clubbing or cyanosis of digits; no joint swelling or tenderness to palpation  PSYCH: A+O to person, place, and time; affect appropriate    LABS:                        9.7    12.09 )-----------( 194      ( 18 Dec 2023 05:26 )             30.9     12-18    133<L>  |  91<L>  |  43<H>  ----------------------------<  165<H>  5.7<H>   |  28  |  6.69<H>    Ca    9.7      18 Dec 2023 05:26  Phos  5.7     12-18  Mg     2.2     12-18    TPro  6.6  /  Alb  3.4  /  TBili  0.2  /  DBili  x   /  AST  22  /  ALT  18  /  AlkPhos  74  12-18    PT/INR - ( 17 Dec 2023 14:33 )   PT: 13.1 sec;   INR: 1.20 ratio         PTT - ( 17 Dec 2023 14:33 )  PTT:29.2 sec      Urinalysis Basic - ( 18 Dec 2023 05:26 )    Color: x / Appearance: x / SG: x / pH: x  Gluc: 165 mg/dL / Ketone: x  / Bili: x / Urobili: x   Blood: x / Protein: x / Nitrite: x   Leuk Esterase: x / RBC: x / WBC x   Sq Epi: x / Non Sq Epi: x / Bacteria: x        Culture - Blood (collected 17 Dec 2023 14:23)  Source: .Blood Blood-Peripheral  Preliminary Report (18 Dec 2023 20:02):    No growth at 24 hours    Culture - Blood (collected 17 Dec 2023 14:20)  Source: .Blood Blood-Peripheral  Preliminary Report (18 Dec 2023 20:02):    No growth at 24 hours        Tele Reviewed:    RADIOLOGY & ADDITIONAL TESTS:  Results Reviewed:   Imaging Personally Reviewed:  Electrocardiogram Personally Reviewed:     PROGRESS NOTE:   Authored by Dr. Kimberlee Moscoso MD (PGY-1). Pager [facility] [phone] / [place] ( [id number]) or via TEAMS    Patient is a 83y old  Female who presents with a chief complaint of AHRF (18 Dec 2023 07:45)      SUBJECTIVE / OVERNIGHT EVENTS:  No acute events overnight. Pt states wasn't able to get good restful sleep. Improvement in SOB after dialysis. On NC. Denies CP, abd pain.       MEDICATIONS  (STANDING):  albuterol/ipratropium for Nebulization 3 milliLiter(s) Nebulizer every 6 hours  atorvastatin 20 milliGRAM(s) Oral at bedtime  azithromycin  IVPB 500 milliGRAM(s) IV Intermittent every 24 hours  benzonatate 100 milliGRAM(s) Oral three times a day  cefTRIAXone   IVPB 1000 milliGRAM(s) IV Intermittent every 24 hours  dextrose 5%. 1000 milliLiter(s) (100 mL/Hr) IV Continuous <Continuous>  dextrose 50% Injectable 25 Gram(s) IV Push once  famotidine    Tablet 20 milliGRAM(s) Oral daily  glucagon  Injectable 1 milliGRAM(s) IntraMuscular once  heparin   Injectable 5000 Unit(s) SubCutaneous every 12 hours  influenza  Vaccine (HIGH DOSE) 0.7 milliLiter(s) IntraMuscular once  insulin glargine Injectable (LANTUS) 12 Unit(s) SubCutaneous at bedtime  insulin lispro (ADMELOG) corrective regimen sliding scale   SubCutaneous every 6 hours  levothyroxine 75 MICROGram(s) Oral daily  mupirocin 2% Nasal 1 Application(s) Both Nostrils two times a day  sevelamer carbonate 800 milliGRAM(s) Oral <User Schedule>    MEDICATIONS  (PRN):  guaiFENesin Oral Liquid (Sugar-Free) 100 milliGRAM(s) Oral every 6 hours PRN Cough      CAPILLARY BLOOD GLUCOSE      POCT Blood Glucose.: 82 mg/dL (19 Dec 2023 06:23)  POCT Blood Glucose.: 211 mg/dL (19 Dec 2023 00:14)  POCT Blood Glucose.: 178 mg/dL (18 Dec 2023 22:40)  POCT Blood Glucose.: 98 mg/dL (18 Dec 2023 18:48)  POCT Blood Glucose.: 106 mg/dL (18 Dec 2023 11:23)    I&O's Summary    18 Dec 2023 07:01  -  19 Dec 2023 07:00  --------------------------------------------------------  IN: 0 mL / OUT: 2000 mL / NET: -2000 mL        PHYSICAL EXAM:  Vital Signs Last 24 Hrs  T(C): 36.9 (19 Dec 2023 06:29), Max: 37.2 (18 Dec 2023 21:05)  T(F): 98.4 (19 Dec 2023 06:29), Max: 99 (18 Dec 2023 21:05)  HR: 90 (19 Dec 2023 06:29) (74 - 95)  BP: 133/65 (19 Dec 2023 06:29) (102/65 - 142/60)  BP(mean): 65 (18 Dec 2023 10:22) (65 - 81)  RR: 18 (19 Dec 2023 06:29) (17 - 20)  SpO2: 98% (19 Dec 2023 06:29) (98% - 100%)    Parameters below as of 19 Dec 2023 06:29  Patient On (Oxygen Delivery Method): nasal cannula  O2 Flow (L/min): 3      CONSTITUTIONAL: NAD, well-developed obese female on NC   RESPIRATORY: Normal respiratory effort; scattered wheezing no crackles or rales   CARDIOVASCULAR: Regular rate and rhythm, normal S1 and S2, no murmur/rub/gallop; No lower extremity edema; Peripheral pulses are 2+ bilaterally  ABDOMEN: Nontender to palpation, normoactive bowel sounds, no rebound/guarding; No hepatosplenomegaly  MSK: no clubbing or cyanosis of digits; no joint swelling or tenderness to palpation  PSYCH: A+O to person, place, and time; affect appropriate    LABS:                        9.7    12.09 )-----------( 194      ( 18 Dec 2023 05:26 )             30.9     12-18    133<L>  |  91<L>  |  43<H>  ----------------------------<  165<H>  5.7<H>   |  28  |  6.69<H>    Ca    9.7      18 Dec 2023 05:26  Phos  5.7     12-18  Mg     2.2     12-18    TPro  6.6  /  Alb  3.4  /  TBili  0.2  /  DBili  x   /  AST  22  /  ALT  18  /  AlkPhos  74  12-18    PT/INR - ( 17 Dec 2023 14:33 )   PT: 13.1 sec;   INR: 1.20 ratio         PTT - ( 17 Dec 2023 14:33 )  PTT:29.2 sec      Urinalysis Basic - ( 18 Dec 2023 05:26 )    Color: x / Appearance: x / SG: x / pH: x  Gluc: 165 mg/dL / Ketone: x  / Bili: x / Urobili: x   Blood: x / Protein: x / Nitrite: x   Leuk Esterase: x / RBC: x / WBC x   Sq Epi: x / Non Sq Epi: x / Bacteria: x        Culture - Blood (collected 17 Dec 2023 14:23)  Source: .Blood Blood-Peripheral  Preliminary Report (18 Dec 2023 20:02):    No growth at 24 hours    Culture - Blood (collected 17 Dec 2023 14:20)  Source: .Blood Blood-Peripheral  Preliminary Report (18 Dec 2023 20:02):    No growth at 24 hours        Tele Reviewed:    RADIOLOGY & ADDITIONAL TESTS:  Results Reviewed:   Imaging Personally Reviewed:  Electrocardiogram Personally Reviewed:

## 2023-12-19 NOTE — PATIENT PROFILE ADULT - FALL HARM RISK - HARM RISK INTERVENTIONS
Assistance with ambulation/Assistance OOB with selected safe patient handling equipment/Communicate Risk of Fall with Harm to all staff/Discuss with provider need for PT consult/Monitor gait and stability/Provide patient with walking aids - walker, cane, crutches/Reinforce activity limits and safety measures with patient and family/Tailored Fall Risk Interventions/Visual Cue: Yellow wristband and red socks/Bed in lowest position, wheels locked, appropriate side rails in place/Call bell, personal items and telephone in reach/Instruct patient to call for assistance before getting out of bed or chair/Non-slip footwear when patient is out of bed/[place] to call system/Physically safe environment - no spills, clutter or unnecessary equipment/Purposeful Proactive Rounding/Room/bathroom lighting operational, light cord in reach Assistance with ambulation/Assistance OOB with selected safe patient handling equipment/Communicate Risk of Fall with Harm to all staff/Discuss with provider need for PT consult/Monitor gait and stability/Provide patient with walking aids - walker, cane, crutches/Reinforce activity limits and safety measures with patient and family/Tailored Fall Risk Interventions/Visual Cue: Yellow wristband and red socks/Bed in lowest position, wheels locked, appropriate side rails in place/Call bell, personal items and telephone in reach/Instruct patient to call for assistance before getting out of bed or chair/Non-slip footwear when patient is out of bed/[place] to call system/Physically safe environment - no spills, clutter or unnecessary equipment/Purposeful Proactive Rounding/Room/bathroom lighting operational, light cord in reach

## 2023-12-19 NOTE — PROGRESS NOTE ADULT - PROBLEM SELECTOR PLAN 2
Likely 2/2  +RSV with possible superimposed PNA with thick secretions, small component of volume overload  Requiring new BiPAP 10/5 40% FiO2 in ED  - transitioned to HFNC 12/18 AM  BNP elevated however more likely falsely elevated iso ESRD, less likely ADHF    - c/w ceftriaxone (12/17-12/21) and azithromycin (12/17-12/19), narrow per Cx, MRSA swab, Ulegionella/strep   - duonebs q6h  - hypertonic saline nebs for mobilization of secretions   - plan for iHD 12/18 with 2-3L UF  - f/u TTE Likely 2/2  +RSV with possible superimposed PNA with thick secretions, small component of volume overload  Requiring new BiPAP 10/5 40% FiO2 in ED  - transitioned to HFNC 12/18 AM  - on NC 12/19, VBG showing likely chronic co2 retention pt may benefit from BIPAP/ CPAP nightly   BNP elevated however more likely falsely elevated iso ESRD, less likely ADHF    - c/w ceftriaxone (12/17-12/21) and azithromycin (12/18-12/20), narrow per Cx, MRSA swab, Ulegionella/strep   - duonebs q6h  - hypertonic saline nebs for mobilization of secretions   - plan for iHD 12/18 with 2-3L UF  - f/u TTE

## 2023-12-19 NOTE — PROGRESS NOTE ADULT - PROBLEM SELECTOR PLAN 4
Elevated troponin to 313 on admission to 317  Chest pain-free, EKG on admission with sinus tach, LAD, no ST segment changes   Low suspicion for ACS at this time, trops likely elevated from ESRD +/- demand of sepsis   - trend trops to peak, c/s cardiology if significant delta   - monitor tele   - TTE eval for WMAs Elevated troponin to 313 on admission to 317, decreasing to 37   Chest pain-free, EKG on admission with sinus tach, LAD, no ST segment changes   Low suspicion for ACS at this time, trops likely elevated from ESRD +/- demand of sepsis    - monitor tele   - TTE eval for WMAs

## 2023-12-19 NOTE — PHARMACOTHERAPY INTERVENTION NOTE - COMMENTS
Modified penicillin allergy history to state that patient tolerated ceftriaxone during this admission.    Lilli Srinivasan, PharmD, [facility]  Clinical Pharmacy Specialist, Infectious Diseases  Tele-Antimicrobial Stewardship Program (Tele-ASP)  Tele-ASP Phone: [phone]   Modified penicillin allergy history to state that patient tolerated ceftriaxone during this admission.    Lilli Srinivasan, PharmD, [facility]  Clinical Pharmacy Specialist, Infectious Diseases  Tele-Antimicrobial Stewardship Program (Tele-ASP)  Tele-ASP Phone: [phone]

## 2023-12-20 ENCOUNTER — TRANSCRIPTION ENCOUNTER (OUTPATIENT)
Age: 83
End: 2023-12-20

## 2023-12-20 LAB
ALBUMIN SERPL ELPH-MCNC: 3.5 G/DL — SIGNIFICANT CHANGE UP (ref 3.3–5)
ALBUMIN SERPL ELPH-MCNC: 3.5 G/DL — SIGNIFICANT CHANGE UP (ref 3.3–5)
ALP SERPL-CCNC: 83 U/L — SIGNIFICANT CHANGE UP (ref 40–120)
ALP SERPL-CCNC: 83 U/L — SIGNIFICANT CHANGE UP (ref 40–120)
ALT FLD-CCNC: 22 U/L — SIGNIFICANT CHANGE UP (ref 10–45)
ALT FLD-CCNC: 22 U/L — SIGNIFICANT CHANGE UP (ref 10–45)
ANION GAP SERPL CALC-SCNC: 13 MMOL/L — SIGNIFICANT CHANGE UP (ref 5–17)
ANION GAP SERPL CALC-SCNC: 13 MMOL/L — SIGNIFICANT CHANGE UP (ref 5–17)
AST SERPL-CCNC: 22 U/L — SIGNIFICANT CHANGE UP (ref 10–40)
AST SERPL-CCNC: 22 U/L — SIGNIFICANT CHANGE UP (ref 10–40)
BASE EXCESS BLDV CALC-SCNC: 4.5 MMOL/L — HIGH (ref -2–3)
BASE EXCESS BLDV CALC-SCNC: 4.5 MMOL/L — HIGH (ref -2–3)
BASE EXCESS BLDV CALC-SCNC: 4.9 MMOL/L — HIGH (ref -2–3)
BASE EXCESS BLDV CALC-SCNC: 4.9 MMOL/L — HIGH (ref -2–3)
BILIRUB SERPL-MCNC: 0.2 MG/DL — SIGNIFICANT CHANGE UP (ref 0.2–1.2)
BILIRUB SERPL-MCNC: 0.2 MG/DL — SIGNIFICANT CHANGE UP (ref 0.2–1.2)
BUN SERPL-MCNC: 36 MG/DL — HIGH (ref 7–23)
BUN SERPL-MCNC: 36 MG/DL — HIGH (ref 7–23)
CA-I SERPL-SCNC: 1.18 MMOL/L — SIGNIFICANT CHANGE UP (ref 1.15–1.33)
CA-I SERPL-SCNC: 1.18 MMOL/L — SIGNIFICANT CHANGE UP (ref 1.15–1.33)
CA-I SERPL-SCNC: 1.2 MMOL/L — SIGNIFICANT CHANGE UP (ref 1.15–1.33)
CA-I SERPL-SCNC: 1.2 MMOL/L — SIGNIFICANT CHANGE UP (ref 1.15–1.33)
CALCIUM SERPL-MCNC: 9.6 MG/DL — SIGNIFICANT CHANGE UP (ref 8.4–10.5)
CALCIUM SERPL-MCNC: 9.6 MG/DL — SIGNIFICANT CHANGE UP (ref 8.4–10.5)
CHLORIDE BLDV-SCNC: 94 MMOL/L — LOW (ref 96–108)
CHLORIDE BLDV-SCNC: 94 MMOL/L — LOW (ref 96–108)
CHLORIDE BLDV-SCNC: 98 MMOL/L — SIGNIFICANT CHANGE UP (ref 96–108)
CHLORIDE BLDV-SCNC: 98 MMOL/L — SIGNIFICANT CHANGE UP (ref 96–108)
CHLORIDE SERPL-SCNC: 92 MMOL/L — LOW (ref 96–108)
CHLORIDE SERPL-SCNC: 92 MMOL/L — LOW (ref 96–108)
CO2 BLDV-SCNC: 34 MMOL/L — HIGH (ref 22–26)
CO2 BLDV-SCNC: 34 MMOL/L — HIGH (ref 22–26)
CO2 BLDV-SCNC: 36 MMOL/L — HIGH (ref 22–26)
CO2 BLDV-SCNC: 36 MMOL/L — HIGH (ref 22–26)
CO2 SERPL-SCNC: 29 MMOL/L — SIGNIFICANT CHANGE UP (ref 22–31)
CO2 SERPL-SCNC: 29 MMOL/L — SIGNIFICANT CHANGE UP (ref 22–31)
CREAT SERPL-MCNC: 5.35 MG/DL — HIGH (ref 0.5–1.3)
CREAT SERPL-MCNC: 5.35 MG/DL — HIGH (ref 0.5–1.3)
EGFR: 8 ML/MIN/1.73M2 — LOW
EGFR: 8 ML/MIN/1.73M2 — LOW
FOLATE SERPL-MCNC: >20 NG/ML — SIGNIFICANT CHANGE UP
FOLATE SERPL-MCNC: >20 NG/ML — SIGNIFICANT CHANGE UP
GAS PNL BLDV: 129 MMOL/L — LOW (ref 136–145)
GAS PNL BLDV: 129 MMOL/L — LOW (ref 136–145)
GAS PNL BLDV: 135 MMOL/L — LOW (ref 136–145)
GAS PNL BLDV: 135 MMOL/L — LOW (ref 136–145)
GAS PNL BLDV: SIGNIFICANT CHANGE UP
GLUCOSE BLDC GLUCOMTR-MCNC: 114 MG/DL — HIGH (ref 70–99)
GLUCOSE BLDC GLUCOMTR-MCNC: 114 MG/DL — HIGH (ref 70–99)
GLUCOSE BLDC GLUCOMTR-MCNC: 129 MG/DL — HIGH (ref 70–99)
GLUCOSE BLDC GLUCOMTR-MCNC: 129 MG/DL — HIGH (ref 70–99)
GLUCOSE BLDC GLUCOMTR-MCNC: 141 MG/DL — HIGH (ref 70–99)
GLUCOSE BLDC GLUCOMTR-MCNC: 141 MG/DL — HIGH (ref 70–99)
GLUCOSE BLDC GLUCOMTR-MCNC: 142 MG/DL — HIGH (ref 70–99)
GLUCOSE BLDC GLUCOMTR-MCNC: 142 MG/DL — HIGH (ref 70–99)
GLUCOSE BLDC GLUCOMTR-MCNC: 241 MG/DL — HIGH (ref 70–99)
GLUCOSE BLDC GLUCOMTR-MCNC: 241 MG/DL — HIGH (ref 70–99)
GLUCOSE BLDC GLUCOMTR-MCNC: 253 MG/DL — HIGH (ref 70–99)
GLUCOSE BLDC GLUCOMTR-MCNC: 253 MG/DL — HIGH (ref 70–99)
GLUCOSE BLDV-MCNC: 148 MG/DL — HIGH (ref 70–99)
GLUCOSE BLDV-MCNC: 148 MG/DL — HIGH (ref 70–99)
GLUCOSE BLDV-MCNC: 253 MG/DL — HIGH (ref 70–99)
GLUCOSE BLDV-MCNC: 253 MG/DL — HIGH (ref 70–99)
GLUCOSE SERPL-MCNC: 147 MG/DL — HIGH (ref 70–99)
GLUCOSE SERPL-MCNC: 147 MG/DL — HIGH (ref 70–99)
HCO3 BLDV-SCNC: 32 MMOL/L — HIGH (ref 22–29)
HCO3 BLDV-SCNC: 32 MMOL/L — HIGH (ref 22–29)
HCO3 BLDV-SCNC: 34 MMOL/L — HIGH (ref 22–29)
HCO3 BLDV-SCNC: 34 MMOL/L — HIGH (ref 22–29)
HCT VFR BLD CALC: 33.3 % — LOW (ref 34.5–45)
HCT VFR BLD CALC: 33.3 % — LOW (ref 34.5–45)
HCT VFR BLDA CALC: 33 % — LOW (ref 34.5–46.5)
HCT VFR BLDA CALC: 33 % — LOW (ref 34.5–46.5)
HCT VFR BLDA CALC: 36 % — SIGNIFICANT CHANGE UP (ref 34.5–46.5)
HCT VFR BLDA CALC: 36 % — SIGNIFICANT CHANGE UP (ref 34.5–46.5)
HGB BLD CALC-MCNC: 11 G/DL — LOW (ref 11.7–16.1)
HGB BLD CALC-MCNC: 11 G/DL — LOW (ref 11.7–16.1)
HGB BLD CALC-MCNC: 11.9 G/DL — SIGNIFICANT CHANGE UP (ref 11.7–16.1)
HGB BLD CALC-MCNC: 11.9 G/DL — SIGNIFICANT CHANGE UP (ref 11.7–16.1)
HGB BLD-MCNC: 10.7 G/DL — LOW (ref 11.5–15.5)
HGB BLD-MCNC: 10.7 G/DL — LOW (ref 11.5–15.5)
LACTATE BLDV-MCNC: 1.5 MMOL/L — SIGNIFICANT CHANGE UP (ref 0.5–2)
LACTATE BLDV-MCNC: 1.5 MMOL/L — SIGNIFICANT CHANGE UP (ref 0.5–2)
LACTATE BLDV-MCNC: 2.5 MMOL/L — HIGH (ref 0.5–2)
LACTATE BLDV-MCNC: 2.5 MMOL/L — HIGH (ref 0.5–2)
MAGNESIUM SERPL-MCNC: 2.2 MG/DL — SIGNIFICANT CHANGE UP (ref 1.6–2.6)
MAGNESIUM SERPL-MCNC: 2.2 MG/DL — SIGNIFICANT CHANGE UP (ref 1.6–2.6)
MCHC RBC-ENTMCNC: 32.1 GM/DL — SIGNIFICANT CHANGE UP (ref 32–36)
MCHC RBC-ENTMCNC: 32.1 GM/DL — SIGNIFICANT CHANGE UP (ref 32–36)
MCHC RBC-ENTMCNC: 33.3 PG — SIGNIFICANT CHANGE UP (ref 27–34)
MCHC RBC-ENTMCNC: 33.3 PG — SIGNIFICANT CHANGE UP (ref 27–34)
MCV RBC AUTO: 103.7 FL — HIGH (ref 80–100)
MCV RBC AUTO: 103.7 FL — HIGH (ref 80–100)
NRBC # BLD: 0 /100 WBCS — SIGNIFICANT CHANGE UP (ref 0–0)
NRBC # BLD: 0 /100 WBCS — SIGNIFICANT CHANGE UP (ref 0–0)
PCO2 BLDV: 61 MMHG — HIGH (ref 39–42)
PCO2 BLDV: 61 MMHG — HIGH (ref 39–42)
PCO2 BLDV: 75 MMHG — HIGH (ref 39–42)
PCO2 BLDV: 75 MMHG — HIGH (ref 39–42)
PH BLDV: 7.26 — LOW (ref 7.32–7.43)
PH BLDV: 7.26 — LOW (ref 7.32–7.43)
PH BLDV: 7.33 — SIGNIFICANT CHANGE UP (ref 7.32–7.43)
PH BLDV: 7.33 — SIGNIFICANT CHANGE UP (ref 7.32–7.43)
PHOSPHATE SERPL-MCNC: 4.8 MG/DL — HIGH (ref 2.5–4.5)
PHOSPHATE SERPL-MCNC: 4.8 MG/DL — HIGH (ref 2.5–4.5)
PLATELET # BLD AUTO: 222 K/UL — SIGNIFICANT CHANGE UP (ref 150–400)
PLATELET # BLD AUTO: 222 K/UL — SIGNIFICANT CHANGE UP (ref 150–400)
PO2 BLDV: 164 MMHG — HIGH (ref 25–45)
PO2 BLDV: 164 MMHG — HIGH (ref 25–45)
PO2 BLDV: 33 MMHG — SIGNIFICANT CHANGE UP (ref 25–45)
PO2 BLDV: 33 MMHG — SIGNIFICANT CHANGE UP (ref 25–45)
POTASSIUM BLDV-SCNC: 3.9 MMOL/L — SIGNIFICANT CHANGE UP (ref 3.5–5.1)
POTASSIUM BLDV-SCNC: 3.9 MMOL/L — SIGNIFICANT CHANGE UP (ref 3.5–5.1)
POTASSIUM BLDV-SCNC: 4.2 MMOL/L — SIGNIFICANT CHANGE UP (ref 3.5–5.1)
POTASSIUM BLDV-SCNC: 4.2 MMOL/L — SIGNIFICANT CHANGE UP (ref 3.5–5.1)
POTASSIUM SERPL-MCNC: 4.1 MMOL/L — SIGNIFICANT CHANGE UP (ref 3.5–5.3)
POTASSIUM SERPL-MCNC: 4.1 MMOL/L — SIGNIFICANT CHANGE UP (ref 3.5–5.3)
POTASSIUM SERPL-SCNC: 4.1 MMOL/L — SIGNIFICANT CHANGE UP (ref 3.5–5.3)
POTASSIUM SERPL-SCNC: 4.1 MMOL/L — SIGNIFICANT CHANGE UP (ref 3.5–5.3)
PROT SERPL-MCNC: 6.7 G/DL — SIGNIFICANT CHANGE UP (ref 6–8.3)
PROT SERPL-MCNC: 6.7 G/DL — SIGNIFICANT CHANGE UP (ref 6–8.3)
RBC # BLD: 3.21 M/UL — LOW (ref 3.8–5.2)
RBC # BLD: 3.21 M/UL — LOW (ref 3.8–5.2)
RBC # FLD: 13.4 % — SIGNIFICANT CHANGE UP (ref 10.3–14.5)
RBC # FLD: 13.4 % — SIGNIFICANT CHANGE UP (ref 10.3–14.5)
SAO2 % BLDV: 100 % — HIGH (ref 67–88)
SAO2 % BLDV: 100 % — HIGH (ref 67–88)
SAO2 % BLDV: 55.1 % — LOW (ref 67–88)
SAO2 % BLDV: 55.1 % — LOW (ref 67–88)
SODIUM SERPL-SCNC: 134 MMOL/L — LOW (ref 135–145)
SODIUM SERPL-SCNC: 134 MMOL/L — LOW (ref 135–145)
VIT B12 SERPL-MCNC: 1748 PG/ML — HIGH (ref 232–1245)
VIT B12 SERPL-MCNC: 1748 PG/ML — HIGH (ref 232–1245)
WBC # BLD: 8.04 K/UL — SIGNIFICANT CHANGE UP (ref 3.8–10.5)
WBC # BLD: 8.04 K/UL — SIGNIFICANT CHANGE UP (ref 3.8–10.5)
WBC # FLD AUTO: 8.04 K/UL — SIGNIFICANT CHANGE UP (ref 3.8–10.5)
WBC # FLD AUTO: 8.04 K/UL — SIGNIFICANT CHANGE UP (ref 3.8–10.5)

## 2023-12-20 PROCEDURE — 99233 SBSQ HOSP IP/OBS HIGH 50: CPT

## 2023-12-20 PROCEDURE — 99232 SBSQ HOSP IP/OBS MODERATE 35: CPT | Mod: GC

## 2023-12-20 RX ORDER — INSULIN LISPRO 100/ML
VIAL (ML) SUBCUTANEOUS
Refills: 0 | Status: DISCONTINUED | OUTPATIENT
Start: 2023-12-20 | End: 2023-12-21

## 2023-12-20 RX ORDER — CHLORHEXIDINE GLUCONATE 213 G/1000ML
1 SOLUTION TOPICAL
Refills: 0 | Status: DISCONTINUED | OUTPATIENT
Start: 2023-12-20 | End: 2023-12-22

## 2023-12-20 RX ORDER — INSULIN LISPRO 100/ML
VIAL (ML) SUBCUTANEOUS AT BEDTIME
Refills: 0 | Status: DISCONTINUED | OUTPATIENT
Start: 2023-12-20 | End: 2023-12-21

## 2023-12-20 RX ADMIN — MUPIROCIN 1 APPLICATION(S): 20 OINTMENT TOPICAL at 17:33

## 2023-12-20 RX ADMIN — Medication 100 MILLIGRAM(S): at 22:42

## 2023-12-20 RX ADMIN — Medication 3: at 17:33

## 2023-12-20 RX ADMIN — HEPARIN SODIUM 5000 UNIT(S): 5000 INJECTION INTRAVENOUS; SUBCUTANEOUS at 17:33

## 2023-12-20 RX ADMIN — Medication 3 MILLILITER(S): at 22:44

## 2023-12-20 RX ADMIN — Medication 100 MILLIGRAM(S): at 06:17

## 2023-12-20 RX ADMIN — INSULIN GLARGINE 12 UNIT(S): 100 INJECTION, SOLUTION SUBCUTANEOUS at 22:43

## 2023-12-20 RX ADMIN — Medication 3 MILLILITER(S): at 06:18

## 2023-12-20 RX ADMIN — AZITHROMYCIN 255 MILLIGRAM(S): 500 TABLET, FILM COATED ORAL at 22:42

## 2023-12-20 RX ADMIN — CEFTRIAXONE 100 MILLIGRAM(S): 500 INJECTION, POWDER, FOR SOLUTION INTRAMUSCULAR; INTRAVENOUS at 22:42

## 2023-12-20 RX ADMIN — Medication 40 MILLIGRAM(S): at 13:52

## 2023-12-20 RX ADMIN — Medication 3 MILLILITER(S): at 13:53

## 2023-12-20 RX ADMIN — Medication 75 MICROGRAM(S): at 06:18

## 2023-12-20 RX ADMIN — FAMOTIDINE 20 MILLIGRAM(S): 10 INJECTION INTRAVENOUS at 13:52

## 2023-12-20 RX ADMIN — Medication 100 MILLIGRAM(S): at 13:53

## 2023-12-20 RX ADMIN — SEVELAMER CARBONATE 800 MILLIGRAM(S): 2400 POWDER, FOR SUSPENSION ORAL at 08:49

## 2023-12-20 RX ADMIN — MUPIROCIN 1 APPLICATION(S): 20 OINTMENT TOPICAL at 06:18

## 2023-12-20 RX ADMIN — CHLORHEXIDINE GLUCONATE 1 APPLICATION(S): 213 SOLUTION TOPICAL at 13:54

## 2023-12-20 RX ADMIN — HEPARIN SODIUM 5000 UNIT(S): 5000 INJECTION INTRAVENOUS; SUBCUTANEOUS at 06:18

## 2023-12-20 RX ADMIN — ATORVASTATIN CALCIUM 20 MILLIGRAM(S): 80 TABLET, FILM COATED ORAL at 22:42

## 2023-12-20 RX ADMIN — Medication 3 MILLILITER(S): at 17:33

## 2023-12-20 NOTE — PROGRESS NOTE ADULT - PROBLEM SELECTOR PLAN 1
Patient follows with Dr. Solis, at Home HD program [place]. Sister cannulates the patient, HD done Mon, Tue,Th, Fri. Last outopatient HD was 12/15, usually remove 1-2L, TW is 100.5kg. On Fri, post HD weight 100.2kg.  Had iHD 12/18 with 2L UF. Patient does not appear to have SOB from volume overload- CXR reviewed, likely from RSV. Now on nasal cannula and breathing better.    HD today 12/20 with 1.5-2L UF as tolerated.  Hgb at goal. Electrolytes ok.      Sonja Gama,   Nephrology Fellow  Feel free to contact me directly on TEAMS with any questions.  (After 5pm or on weekends, please call the on-call fellow).

## 2023-12-20 NOTE — DISCHARGE NOTE PROVIDER - NPI NUMBER (FOR SYSADMIN USE ONLY) :
[[id number]],[[id number]] [[id number]],[[id number]] [[id number]] [[id number]] [[id number]] [[id number]]

## 2023-12-20 NOTE — DISCHARGE NOTE PROVIDER - CARE PROVIDER_API CALL
Myesha Do  Pulmonary Disease  [address] [facility], [address] [address]  Phone: [phone]  Fax: [phone]  Follow Up Time: 1 week    Johnna Mcnamara  Pulmonary Disease  [address] [place] [address], [address] [address]  Phone: [phone]  Fax: [phone]  Follow Up Time: 1 week   Myesha Do  Pulmonary Disease  [address] [facility], [address] [address]  Phone: [phone]  Fax: [phone]  Follow Up Time: 1 week    Johnna Mcnamara  Pulmonary Disease  [address] [place] [address], [address] [address]  Phone: [phone]  Fax: [phone]  Follow Up Time: 1 week   Myesha Do  Pulmonary Disease  [address] [facility], [facility] [address]  [place] [address]  Phone: [phone]  Fax: [phone]  Follow Up Time: 1 week   Myesha Do  Pulmonary Disease  [address] [facility], [facility] [address]  [place] [address]  Phone: [phone]  Fax: [phone]  Follow Up Time: 1 week   Lisker, Gita Naomi  Internal Medicine  [address] [facility], [facility] [address]  [place] [address]  Phone: [phone]  Fax: [phone]  Follow Up Time: 1-3 days   Lisker, Gita Naomi  Internal Medicine  [address] [facility], [facility] [address]  [place] [address]  Phone: [phone]  Fax: [phone]  Follow Up Time: 1-3 days

## 2023-12-20 NOTE — DISCHARGE NOTE PROVIDER - CARE PROVIDERS DIRECT ADDRESSES
,[email or web address] ,[email or web address] ,[email or web address] ,[email or web address]

## 2023-12-20 NOTE — PROGRESS NOTE ADULT - PROBLEM SELECTOR PLAN 2
Likely 2/2  +RSV with possible superimposed PNA with thick secretions, small component of volume overload  Requiring new BiPAP 10/5 40% FiO2 in ED  - transitioned to HFNC 12/18 AM  - on NC 12/19, VBG showing likely chronic co2 retention pt may benefit from BIPAP/ CPAP nightly   BNP elevated however more likely falsely elevated iso ESRD, less likely ADHF    - c/w ceftriaxone (12/17-12/21) and azithromycin (12/18-12/20), narrow per Cx, MRSA swab, Ulegionella/strep   - duonebs q6h  - hypertonic saline nebs for mobilization of secretions   - plan for iHD 12/18 with 2-3L UF  - f/u TTE Likely 2/2  +RSV with possible superimposed PNA with thick secretions, small component of volume overload  Requiring new BiPAP 10/5 40% FiO2 in ED  - transitioned to HFNC 12/18 AM  - on NC 12/19, VBG showing likely chronic co2 retention pt may benefit from BIPAP/ CPAP nightly   - continue to encourage nightly CPAP use, can plan for outpatient sleep study/ PFTs   BNP elevated however more likely falsely elevated iso ESRD, less likely ADHF    - c/w ceftriaxone (12/17-12/21) and azithromycin (12/18-12/20)  - duonebs q6h  - hypertonic saline nebs for mobilization of secretions   - plan for iHD 12/18 with 2-3L UF  - f/u TTE

## 2023-12-20 NOTE — PROGRESS NOTE ADULT - SUBJECTIVE AND OBJECTIVE BOX
[facility] DIVISION OF KIDNEY DISEASES AND HYPERTENSION   FOLLOW UP NOTE    --------------------------------------------------------------------------------    SUBJECTIVE / ROS / INTERVAL EVENTS:  - Patient seen and examined at bedside  - breathings has improved, now on NC  - Tolerated HD with 2L UF on 12/18, HD today      PAST HISTORY  --------------------------------------------------------------------------------  No significant changes to PMH, PSH, FHx, SHx, unless otherwise noted    ALLERGIES & MEDICATIONS  --------------------------------------------------------------------------------  Allergies    penicillin (Unknown)  vancomycin (Rash)    Intolerances      Standing Inpatient Medications  albuterol/ipratropium for Nebulization 3 milliLiter(s) Nebulizer every 6 hours  atorvastatin 20 milliGRAM(s) Oral at bedtime  azithromycin  IVPB 500 milliGRAM(s) IV Intermittent every 24 hours  benzonatate 100 milliGRAM(s) Oral three times a day  cefTRIAXone   IVPB 1000 milliGRAM(s) IV Intermittent every 24 hours  dextrose 5%. 1000 milliLiter(s) IV Continuous <Continuous>  dextrose 50% Injectable 25 Gram(s) IV Push once  famotidine    Tablet 20 milliGRAM(s) Oral daily  glucagon  Injectable 1 milliGRAM(s) IntraMuscular once  heparin   Injectable 5000 Unit(s) SubCutaneous every 12 hours  influenza  Vaccine (HIGH DOSE) 0.7 milliLiter(s) IntraMuscular once  insulin glargine Injectable (LANTUS) 12 Unit(s) SubCutaneous at bedtime  insulin lispro (ADMELOG) corrective regimen sliding scale   SubCutaneous three times a day before meals  insulin lispro (ADMELOG) corrective regimen sliding scale   SubCutaneous at bedtime  levothyroxine 75 MICROGram(s) Oral daily  mupirocin 2% Nasal 1 Application(s) Both Nostrils two times a day  sevelamer carbonate 800 milliGRAM(s) Oral <User Schedule>    PRN Inpatient Medications  guaiFENesin Oral Liquid (Sugar-Free) 100 milliGRAM(s) Oral every 6 hours PRN      VITALS  --------------------------------------------------------------------------------  T(C): 36.7 (12-20-23 @ 06:25), Max: 36.9 (12-19-23 @ 11:23)  HR: 82 (12-20-23 @ 06:25) (76 - 85)  BP: 125/73 (12-20-23 @ 06:25) (119/66 - 153/58)  RR: 18 (12-20-23 @ 06:25) (18 - 18)  SpO2: 99% (12-20-23 @ 06:25) (96% - 100%)  Wt(kg): --      PHYSICAL EXAM:  General: no acute distress  Neuro: no focal deficits  HEENT: NC/AT, anicteric, no JVD  Pulmonary: mild scattered wheezing  Cardiovascular/Chest: +S1S2, RRR  GI/Abdomen: soft, NT/ND, +bowel sounds  Extremities: No edema  Skin: Warm and dry  Vascular access: AVF    LABS/STUDIES  --------------------------------------------------------------------------------              10.7   8.04  >-----------<  222      [12-20-23 @ 06:59]              33.3     134  |  92  |  36  ----------------------------<  147      [12-20-23 @ 06:59]  4.1   |  29  |  5.35        Ca     9.6     [12-20-23 @ 06:59]      Mg     2.2     [12-20-23 @ 06:59]      Phos  4.8     [12-20-23 @ 06:59]    TPro  6.7  /  Alb  3.5  /  TBili  0.2  /  DBili  x   /  AST  22  /  ALT  22  /  AlkPhos  83  [12-20-23 @ 06:59]          Creatinine Trend:  SCr 5.35 [12-20 @ 06:59]  SCr 3.91 [12-19 @ 06:58]  SCr 6.69 [12-18 @ 05:26]  SCr 5.69 [12-17 @ 14:33]    Urinalysis - [12-20-23 @ 06:59]      Color  / Appearance  / SG  / pH       Gluc 147 / Ketone   / Bili  / Urobili        Blood  / Protein  / Leuk Est  / Nitrite       RBC  / WBC  / Hyaline  / Gran  / Sq Epi  / Non Sq Epi  / Bacteria    [facility] DIVISION OF KIDNEY DISEASES AND HYPERTENSION   FOLLOW UP NOTE    --------------------------------------------------------------------------------    SUBJECTIVE / ROS / INTERVAL EVENTS:  - Patient seen and examined at bedside  - breathings has improved, now on NC  - Tolerated HD with 2L UF on 12/18, HD today      PAST HISTORY  --------------------------------------------------------------------------------  No significant changes to PMH, PSH, FHx, SHx, unless otherwise noted    ALLERGIES & MEDICATIONS  --------------------------------------------------------------------------------  Allergies    penicillin (Unknown)  vancomycin (Rash)    Intolerances      Standing Inpatient Medications  albuterol/ipratropium for Nebulization 3 milliLiter(s) Nebulizer every 6 hours  atorvastatin 20 milliGRAM(s) Oral at bedtime  azithromycin  IVPB 500 milliGRAM(s) IV Intermittent every 24 hours  benzonatate 100 milliGRAM(s) Oral three times a day  cefTRIAXone   IVPB 1000 milliGRAM(s) IV Intermittent every 24 hours  dextrose 5%. 1000 milliLiter(s) IV Continuous <Continuous>  dextrose 50% Injectable 25 Gram(s) IV Push once  famotidine    Tablet 20 milliGRAM(s) Oral daily  glucagon  Injectable 1 milliGRAM(s) IntraMuscular once  heparin   Injectable 5000 Unit(s) SubCutaneous every 12 hours  influenza  Vaccine (HIGH DOSE) 0.7 milliLiter(s) IntraMuscular once  insulin glargine Injectable (LANTUS) 12 Unit(s) SubCutaneous at bedtime  insulin lispro (ADMELOG) corrective regimen sliding scale   SubCutaneous three times a day before meals  insulin lispro (ADMELOG) corrective regimen sliding scale   SubCutaneous at bedtime  levothyroxine 75 MICROGram(s) Oral daily  mupirocin 2% Nasal 1 Application(s) Both Nostrils two times a day  sevelamer carbonate 800 milliGRAM(s) Oral <User Schedule>    PRN Inpatient Medications  guaiFENesin Oral Liquid (Sugar-Free) 100 milliGRAM(s) Oral every 6 hours PRN      VITALS  --------------------------------------------------------------------------------  T(C): 36.7 (12-20-23 @ 06:25), Max: 36.9 (12-19-23 @ 11:23)  HR: 82 (12-20-23 @ 06:25) (76 - 85)  BP: 125/73 (12-20-23 @ 06:25) (119/66 - 153/58)  RR: 18 (12-20-23 @ 06:25) (18 - 18)  SpO2: 99% (12-20-23 @ 06:25) (96% - 100%)  Wt(kg): --      PHYSICAL EXAM:  General: no acute distress  Neuro: no focal deficits  HEENT: NC/AT, anicteric, no JVD  Pulmonary: mild scattered wheezing  Cardiovascular/Chest: +S1S2, RRR  GI/Abdomen: soft, NT/ND, +bowel sounds  Extremities: No edema  Skin: Warm and dry  Vascular access: AVF    LABS/STUDIES  --------------------------------------------------------------------------------              10.7   8.04  >-----------<  222      [12-20-23 @ 06:59]              33.3     134  |  92  |  36  ----------------------------<  147      [12-20-23 @ 06:59]  4.1   |  29  |  5.35        Ca     9.6     [12-20-23 @ 06:59]      Mg     2.2     [12-20-23 @ 06:59]      Phos  4.8     [12-20-23 @ 06:59]    TPro  6.7  /  Alb  3.5  /  TBili  0.2  /  DBili  x   /  AST  22  /  ALT  22  /  AlkPhos  83  [12-20-23 @ 06:59]          Creatinine Trend:  SCr 5.35 [12-20 @ 06:59]  SCr 3.91 [12-19 @ 06:58]  SCr 6.69 [12-18 @ 05:26]  SCr 5.69 [12-17 @ 14:33]    Urinalysis - [12-20-23 @ 06:59]      Color  / Appearance  / SG  / pH       Gluc 147 / Ketone   / Bili  / Urobili        Blood  / Protein  / Leuk Est  / Nitrite       RBC  / WBC  / Hyaline  / Gran  / Sq Epi  / Non Sq Epi  / Bacteria

## 2023-12-20 NOTE — DISCHARGE NOTE PROVIDER - PROVIDER TOKENS
PROVIDER:[TOKEN:[[id number]],FOLLOWUP:[1 week]],PROVIDER:[TOKEN:[[id number]],FOLLOWUP:[1 week]] PROVIDER:[TOKEN:[[id number]],FOLLOWUP:[1 week]] PROVIDER:[TOKEN:[[id number]],FOLLOWUP:[1-3 days]]

## 2023-12-20 NOTE — PROGRESS NOTE ADULT - PROBLEM SELECTOR PLAN 1
SIRS met w/ tachypnea, leukocytosis  iso RSV+, procal+ w/ CXR c/f bacterial PNA     - CAP tx CTX/azithro empiric course 5 days ceftriaxone 12/17- 21  - azithromycin 12/18- 12/20   - VS q4h, trend WBC/fever curve   - tx of AHRF as below

## 2023-12-20 NOTE — DISCHARGE NOTE PROVIDER - NSDCCPTREATMENT_GEN_ALL_CORE_FT
PRINCIPAL PROCEDURE  Procedure: XR chest AP  Findings and Treatment:   INTERPRETATION:  CLINICAL INDICATION: Cough. Evaluate for pneumonia.  EXAM: Frontal radiograph of the chest.  COMPARISON: Chest radiograph from12/14/2022.  FINDINGS:  Surgical clips along the right chest wall.  Right perihilar hazy opacity, similar to prior imaging.  There is no pleural effusion or pneumothorax.  The heart is not well evaluated in this position. Aortic calcification.  The visualized osseous structures demonstrate no acute pathology.  IMPRESSION:  Right perihilar hazy opacity, similar to prior imaging.  --- End of Report ---        SECONDARY PROCEDURE  Procedure: CT chest wo con  Findings and Treatment: INTERPRETATION:  INDICATION: Hypoxia,RSV positive  TECHNIQUE: Helical acquisition images of the chest without intravenous   contrast. Maximum intensity projection images were generated.  COMPARISON: None.  FINDINGS:  LUNGS/AIRWAYS/PLEURA: Nonobstructing tracheal secretions. No   endobronchial lesion. Small clustered and branching centrilobular nodules   in the upper lobes and superior segments of both lower lobes. No pleural   abnormality.  LYMPH NODES/MEDIASTINUM: No lymphadenopathy. Calcified right paratracheal   lymph node, likely prior granulomatous disease.  HEART/VASCULATURE: Normal heart size. No pericardial effusion. Normal   caliber aorta. Heavily calcified coronary arteries.  UPPER ABDOMEN: Atrophic kidneys. Small left renal cyst. Small   calcification in the peripheral aspect of the right kidney.  BONES/SOFT TISSUES: Right mastectomy. Degenerative sclerosis inthe spine.  IMPRESSION:  Small airways process involving both lungs compatible with infectious   bronchiolitis.

## 2023-12-20 NOTE — DISCHARGE NOTE PROVIDER - NSDCMRMEDTOKEN_GEN_ALL_CORE_FT
atorvastatin 20 mg oral tablet: 1 tab(s) orally once a day  famotidine 20 mg oral tablet: 1 tab(s) orally once a day  Lantus 100 units/mL subcutaneous solution: 16 unit(s) subcutaneous once a day with lunch  Renvela 800 mg oral tablet: 1 tab(s) orally once a day  Synthroid 75 mcg (0.075 mg) oral tablet: 1 tab(s) orally once a day   atorvastatin 20 mg oral tablet: 1 tab(s) orally once a day  famotidine 20 mg oral tablet: 1 tab(s) orally once a day  ipratropium-albuterol 0.5 mg-2.5 mg/3 mL inhalation solution: 3 milliliter(s) by nebulizer every 6 hours  Lantus 100 units/mL subcutaneous solution: 16 unit(s) subcutaneous once a day with lunch  predniSONE 10 mg oral tablet: 1 tab(s) orally once a day take 4 of the 10mg tablets from 12/23- 12/25  take 3 of the 10mg tablets from 12/26-12/27  take 2 of the 10mg tablets from 12/28 until the time of your appointment with Dr. Hi Sears 800 mg oral tablet: 1 tab(s) orally once a day  Synthroid 75 mcg (0.075 mg) oral tablet: 1 tab(s) orally once a day   atorvastatin 20 mg oral tablet: 1 tab(s) orally once a day  famotidine 20 mg oral tablet: 1 tab(s) orally once a day  ipratropium-albuterol 0.5 mg-2.5 mg/3 mL inhalation solution: 3 milliliter(s) by nebulizer every 6 hours  Lantus 100 units/mL subcutaneous solution: 16 unit(s) subcutaneous once a day with lunch  Nebulizer Machine: ICD 10: J40 Bronchitis  predniSONE 10 mg oral tablet: 1 tab(s) orally once a day take 4 of the 10mg tablets from 12/23- 12/25  take 3 of the 10mg tablets from 12/26-12/27  take 2 of the 10mg tablets from 12/28 until the time of your appointment with Dr. Hi Sears 800 mg oral tablet: 1 tab(s) orally once a day  Synthroid 75 mcg (0.075 mg) oral tablet: 1 tab(s) orally once a day

## 2023-12-20 NOTE — PROGRESS NOTE ADULT - SUBJECTIVE AND OBJECTIVE BOX
CHIEF COMPLAINT:     Interval Events:    REVIEW OF SYSTEMS:  Constitutional:   Eyes:  ENT:  CV:  Resp:  GI:  :  MSK:  Integumentary:  Neurological:  Psychiatric:  Endocrine:  Hematologic/Lymphatic:  Allergic/Immunologic:  [ ] All other systems negative  [ ] Unable to assess ROS because ________    OBJECTIVE:  ICU Vital Signs Last 24 Hrs  T(C): 36.3 (20 Dec 2023 13:00), Max: 36.8 (20 Dec 2023 00:34)  T(F): 97.4 (20 Dec 2023 13:00), Max: 98.3 (20 Dec 2023 00:34)  HR: 63 (20 Dec 2023 13:00) (63 - 85)  BP: 171/68 (20 Dec 2023 13:00) (123/66 - 171/68)  BP(mean): --  ABP: --  ABP(mean): --  RR: 16 (20 Dec 2023 13:00) (16 - 18)  SpO2: 100% (20 Dec 2023 13:00) (96% - 100%)    O2 Parameters below as of 20 Dec 2023 13:00  Patient On (Oxygen Delivery Method): nasal cannula  O2 Flow (L/min): 2            12-20 @ 07:01  -  12-20 @ 17:08  --------------------------------------------------------  IN: 0 mL / OUT: 1300 mL / NET: -1300 mL      CAPILLARY BLOOD GLUCOSE      POCT Blood Glucose.: 253 mg/dL (20 Dec 2023 16:55)      PHYSICAL EXAM:  General:   HEENT:   Lymph Nodes:  Neck:   Respiratory:   Cardiovascular:   Abdomen:   Extremities:   Skin:   Neurological:  Psychiatry:    HOSPITAL MEDICATIONS:  MEDICATIONS  (STANDING):  albuterol/ipratropium for Nebulization 3 milliLiter(s) Nebulizer every 6 hours  atorvastatin 20 milliGRAM(s) Oral at bedtime  azithromycin  IVPB 500 milliGRAM(s) IV Intermittent every 24 hours  benzonatate 100 milliGRAM(s) Oral three times a day  cefTRIAXone   IVPB 1000 milliGRAM(s) IV Intermittent every 24 hours  chlorhexidine 2% Cloths 1 Application(s) Topical <User Schedule>  dextrose 5%. 1000 milliLiter(s) (100 mL/Hr) IV Continuous <Continuous>  dextrose 50% Injectable 25 Gram(s) IV Push once  famotidine    Tablet 20 milliGRAM(s) Oral daily  glucagon  Injectable 1 milliGRAM(s) IntraMuscular once  heparin   Injectable 5000 Unit(s) SubCutaneous every 12 hours  influenza  Vaccine (HIGH DOSE) 0.7 milliLiter(s) IntraMuscular once  insulin glargine Injectable (LANTUS) 12 Unit(s) SubCutaneous at bedtime  insulin lispro (ADMELOG) corrective regimen sliding scale   SubCutaneous three times a day before meals  insulin lispro (ADMELOG) corrective regimen sliding scale   SubCutaneous at bedtime  levothyroxine 75 MICROGram(s) Oral daily  mupirocin 2% Nasal 1 Application(s) Both Nostrils two times a day  predniSONE   Tablet 40 milliGRAM(s) Oral daily  sevelamer carbonate 800 milliGRAM(s) Oral <User Schedule>    MEDICATIONS  (PRN):  guaiFENesin Oral Liquid (Sugar-Free) 100 milliGRAM(s) Oral every 6 hours PRN Cough      LABS:                        10.7   8.04  )-----------( 222      ( 20 Dec 2023 06:59 )             33.3     12-20    134<L>  |  92<L>  |  36<H>  ----------------------------<  147<H>  4.1   |  29  |  5.35<H>    Ca    9.6      20 Dec 2023 06:59  Phos  4.8     12-20  Mg     2.2     12-20    TPro  6.7  /  Alb  3.5  /  TBili  0.2  /  DBili  x   /  AST  22  /  ALT  22  /  AlkPhos  83  12-20      Urinalysis Basic - ( 20 Dec 2023 06:59 )    Color: x / Appearance: x / SG: x / pH: x  Gluc: 147 mg/dL / Ketone: x  / Bili: x / Urobili: x   Blood: x / Protein: x / Nitrite: x   Leuk Esterase: x / RBC: x / WBC x   Sq Epi: x / Non Sq Epi: x / Bacteria: x      Arterial Blood Gas:  12-19 @ 14:17  7.29/71/48/34/83.5/6.4  ABG lactate: --    Venous Blood Gas:  12-20 @ 16:54  7.33/61/164/32/100.0  VBG Lactate: 2.5  Venous Blood Gas:  12-20 @ 07:08  7.26/75/33/34/55.1  VBG Lactate: 1.5  Venous Blood Gas:  12-19 @ 07:09  7.29/70/52/34/83.2  VBG Lactate: 1.5    < from: TTE W or WO Ultrasound Enhancing Agent (12.19.23 @ 14:23) >  TRANSTHORACIC ECHOCARDIOGRAM REPORT  ________________________________________________________________________________                                      _______       Pt. Name:       MARISOL MORRELL Study Date:    12/19/2023  MRN:            [id number]     YOB: 1940  Accession #:    [id number]      Age:           83 years  Account#:       [id number]   Gender:        F  Heart Rate:     87 bpm         Height:        64.00 in (162.56 cm)  Rhythm:         sinus rhythm   Weight:   221.00 lb (100.25 kg)  Blood Pressure: 133/65 mmHg    BSA/BMI:       2.04 m² / 37.93 kg/m²  ________________________________________________________________________________________  Referring Physician:    [id number] Johann Mederos  Interpreting Physician: Jacquelyn David  Primary Sonographer:    Dami Lassiter RDCS    CPT:                ECHO TTE WITH CON COMP W DOPP - .m;DEFINITY ECHO                      CONTRAST PER ML - .m;DEFINITY ECHO CONTRAST PER ML                      WASTED - .m  Indication(s):      Heart failure, unspecified - I50.9  Procedure:          Transthoracic echocardiogram with 2-D, M-mode and complete                      spectral and color flow Doppler.  Ordering Location:  [facility]  Admission Status:   Inpatient  Contrast Injection: Verbal consent was obtained for injection of Ultrasonic                      Enhancing Agent following a discussion of risks and                      benefits.                      Endocardial visualization enhanced with 2 ml of Definity                      Ultrasound enhancing agent (Lot#:[id number] Exp.Date:1DEC24                      Discarded Dose:8ml).  UEA Reaction:       Patient had no adverse reaction after injection of                      Ultrasound Enhancing Agent.  Study Information:  Image quality for this study is fair.    _______________________________________________________________________________________     CONCLUSIONS:      1. Left ventricular cavity is normal. Left ventricular systolic function is low normal with an ejection fraction of 51 % by Villagomez's method of disks.   2. The left ventricular diastolic function is indeterminate.   3. Normal right ventricular cavity size, wall thickness, and systolic function.   4. The left atrium is moderately dilated.   5. The right atrium is mildly dilated in size.   6. There is mild mitral regurgitation. The mitral regurgitant jet is eccentrically directed.   7. There is trace tricuspid regurgitation. There is insufficient tricuspid regurgitation detected tocalculate pulmonary artery systolic pressure.   8. No pericardial effusion seen.   9. No prior echocardiogram is available for comparison.    ________________________________________________________________________________________  FINDINGS:     LeftVentricle:  After obtaining consent, Definity ultrasound enhancing agent was given for enhanced left ventricular opacification and improved delineation of the left ventricular endocardial borders. The left ventricular cavity is normal. Left ventricular systolic function is low normal with a calculated ejection fraction of 51 % by the Villagomez's biplane method of disks. The left ventricular diastolic function is indeterminate. No left ventricular hypertrophy. There is normal LV mass and normal geometry.     Right Ventricle:  After obtaining consent, Definity intravenous contrast was given for enhanced right heart opacification and improved delineation of the endocardial borders. The right ventricular cavity is normal in size, normal wall thickness and normal systolic function. Tricuspid annular plane systolic excursion (TAPSE) is 1.9 cm (normal >=1.7 cm). Tricuspid annular tissue Doppler S' is 13.3 cm/s (normal >10 cm/s).     Left Atrium:  The left atrium is moderately dilated with an indexed volume of 44.10 ml/m².     Right Atrium:  The right atrium is mildly dilated in size with an indexed volume of 27.68 ml/m².     Interatrial Septum:  The interatrial septum appears intact.     Aortic Valve:  The aortic valve is tricuspid with normalleaflet excursion. There is no aortic valve stenosis. There is trace aortic regurgitation.     Mitral Valve:  Structurally normal mitral valve with normal leaflet excursion. There is no mitral valve stenosis. There is mild mitral regurgitation. The mitral regurgitant jet is eccentrically directed.     Tricuspid Valve:  Structurally normal tricuspid valve with normal leaflet excursion. There is trace tricuspid regurgitation. There is insufficient tricuspid regurgitation detected to calculate pulmonary artery systolic pressure.     Pulmonic Valve:  Structurally normal pulmonic valve with normal leaflet excursion. There is trace pulmonic regurgitation.     Aorta:  The aortic annulus and aortic root appear normal in size.     Pericardium:  No pericardial effusion seen.     Systemic Veins:  The inferior vena cava is dilated measuring 2.46 cm in diameter, (dilated >2.1cm) with normal inspiratory collapse (normal >50%) consistent with mildly elevated right atrial pressure (~8, range 5-10mmHg).  ____________________________________________________________________  QUANTITATIVE DATA:  Left Ventricle Measurements: (Indexed to BSA)     IVSd (2D):   0.8 cm  LVPWd (2D):  0.8 cm  LVIDd (2D):  4.8 cm  LVIDs (2D):  3.6 cm  LV Mass:     134 g  65.8 g/m²  LV Vol d, MOD A2C: 131.0 ml 64.19 ml/m²  LV Vol d, MOD A4C: 154.0 ml 75.46 ml/m²  LV Vol d, MOD BP:  142.4 ml 69.79 ml/m²  LV Vol s, MOD A2C: 60.3 ml  29.55 ml/m²  LV Vol s, MOD A4C: 73.9 ml  36.21 ml/m²  LV Vol s, MOD BP:  69.3 ml  33.95 ml/m²  LVOT SV MOD BP:    73.1 ml  LV EF% MOD BP:     51 %     MV E Vmax:    0.88 m/s  MV A Vmax:    0.98 m/s  MV E/A:       0.89  e' lateral:   6.53 cm/s  e' medial:    6.31 cm/s  E/e' lateral: 13.48  E/e' medial:  13.95  E/e' Average: 13.71    Aorta Measurements: (normal range) (Indexed to BSA)     Sinuses of Valsalva: 3.30 cm (2.7 - 3.3 cm)  Ao Asc prox:         3.40 cm  Ao Arch:             2.3 cm       Left Atrium Measurements: (Indexed to BSA)  LA Diam 2D:        3.80 cm  LA Vol s, MOD A4C: 71.90 ml.  LA Vol s, MOD A2C: 108.00 ml.  LA Vol s, MOD BP:  90.00 ml   44.10 ml/m²    Right Ventricle Measurements: Right Atrial Measurements:     TAPSE:           1.9 cm       RA Vol:       56.50 ml  RV Base (RVID1): 3.4 cm       RA Vol Index: 27.68 ml/m²  RV Mid (RVID2):  2.8 cm       LVOT / RVOT/ Qp/Qs Data: (Indexed to BSA)  LVOT Diameter: 2.10 cm  LVOT Vmax:     1.05 m/s  LVOT VTI:      19.40 cm  LVOT SV:       67.2 ml  32.93 ml/m²    Mitral Valve Measurements:     MV E Vmax: 0.9 m/s  MV A Vmax: 1.0m/s  MV E/A:    0.9       Tricuspid Valve Measurements:     RA Pressure: 8 mmHg    ________________________________________________________________________________________  Electronically signed on 12/19/2023 at 5:56:31 PM by Jacquelyn David       < from: CT Chest No Cont (12.18.23 @ 12:16) >    ACC: [id number] EXAM:  CT CHEST   ORDERED BY: DYLAN CAMPBELL     PROCEDURE DATE:  12/18/2023          INTERPRETATION:  INDICATION: Hypoxia,RSV positive    TECHNIQUE: Helical acquisition images of the chest without intravenous   contrast. Maximum intensity projection images were generated.    COMPARISON: None.    FINDINGS:    LUNGS/AIRWAYS/PLEURA: Nonobstructing tracheal secretions. No   endobronchial lesion. Small clustered and branching centrilobular nodules   in the upper lobes and superior segments of both lower lobes. No pleural   abnormality.    LYMPH NODES/MEDIASTINUM: No lymphadenopathy. Calcified right paratracheal   lymph node, likely prior granulomatous disease.    HEART/VASCULATURE: Normal heart size. No pericardial effusion. Normal   caliber aorta. Heavily calcified coronary arteries.    UPPER ABDOMEN: Atrophic kidneys. Small left renal cyst. Small   calcification in the peripheral aspect of the right kidney.    BONES/SOFT TISSUES: Right mastectomy. Degenerative sclerosis inthe spine.      IMPRESSION:    Small airways process involving both lungs compatible with infectious   bronchiolitis.    --- End of Report ---    < end of copied text >    ***Final ***    < end of copied text >    MICROBIOLOGY:     RADIOLOGY:  [ ] Reviewed and interpreted by me    PULMONARY FUNCTION TESTS:    EKG: CHIEF COMPLAINT:     Interval Events:    REVIEW OF SYSTEMS:  Constitutional:   Eyes:  ENT:  CV:  Resp:  GI:  :  MSK:  Integumentary:  Neurological:  Psychiatric:  Endocrine:  Hematologic/Lymphatic:  Allergic/Immunologic:  [ ] All other systems negative  [ ] Unable to assess ROS because ________    OBJECTIVE:  ICU Vital Signs Last 24 Hrs  T(C): 36.3 (20 Dec 2023 13:00), Max: 36.8 (20 Dec 2023 00:34)  T(F): 97.4 (20 Dec 2023 13:00), Max: 98.3 (20 Dec 2023 00:34)  HR: 63 (20 Dec 2023 13:00) (63 - 85)  BP: 171/68 (20 Dec 2023 13:00) (123/66 - 171/68)  BP(mean): --  ABP: --  ABP(mean): --  RR: 16 (20 Dec 2023 13:00) (16 - 18)  SpO2: 100% (20 Dec 2023 13:00) (96% - 100%)    O2 Parameters below as of 20 Dec 2023 13:00  Patient On (Oxygen Delivery Method): nasal cannula  O2 Flow (L/min): 2            12-20 @ 07:01  -  12-20 @ 17:08  --------------------------------------------------------  IN: 0 mL / OUT: 1300 mL / NET: -1300 mL      CAPILLARY BLOOD GLUCOSE      POCT Blood Glucose.: 253 mg/dL (20 Dec 2023 16:55)      PHYSICAL EXAM:  General:   HEENT:   Lymph Nodes:  Neck:   Respiratory:   Cardiovascular:   Abdomen:   Extremities:   Skin:   Neurological:  Psychiatry:    HOSPITAL MEDICATIONS:  MEDICATIONS  (STANDING):  albuterol/ipratropium for Nebulization 3 milliLiter(s) Nebulizer every 6 hours  atorvastatin 20 milliGRAM(s) Oral at bedtime  azithromycin  IVPB 500 milliGRAM(s) IV Intermittent every 24 hours  benzonatate 100 milliGRAM(s) Oral three times a day  cefTRIAXone   IVPB 1000 milliGRAM(s) IV Intermittent every 24 hours  chlorhexidine 2% Cloths 1 Application(s) Topical <User Schedule>  dextrose 5%. 1000 milliLiter(s) (100 mL/Hr) IV Continuous <Continuous>  dextrose 50% Injectable 25 Gram(s) IV Push once  famotidine    Tablet 20 milliGRAM(s) Oral daily  glucagon  Injectable 1 milliGRAM(s) IntraMuscular once  heparin   Injectable 5000 Unit(s) SubCutaneous every 12 hours  influenza  Vaccine (HIGH DOSE) 0.7 milliLiter(s) IntraMuscular once  insulin glargine Injectable (LANTUS) 12 Unit(s) SubCutaneous at bedtime  insulin lispro (ADMELOG) corrective regimen sliding scale   SubCutaneous three times a day before meals  insulin lispro (ADMELOG) corrective regimen sliding scale   SubCutaneous at bedtime  levothyroxine 75 MICROGram(s) Oral daily  mupirocin 2% Nasal 1 Application(s) Both Nostrils two times a day  predniSONE   Tablet 40 milliGRAM(s) Oral daily  sevelamer carbonate 800 milliGRAM(s) Oral <User Schedule>    MEDICATIONS  (PRN):  guaiFENesin Oral Liquid (Sugar-Free) 100 milliGRAM(s) Oral every 6 hours PRN Cough      LABS:                        10.7   8.04  )-----------( 222      ( 20 Dec 2023 06:59 )             33.3     12-20    134<L>  |  92<L>  |  36<H>  ----------------------------<  147<H>  4.1   |  29  |  5.35<H>    Ca    9.6      20 Dec 2023 06:59  Phos  4.8     12-20  Mg     2.2     12-20    TPro  6.7  /  Alb  3.5  /  TBili  0.2  /  DBili  x   /  AST  22  /  ALT  22  /  AlkPhos  83  12-20      Urinalysis Basic - ( 20 Dec 2023 06:59 )    Color: x / Appearance: x / SG: x / pH: x  Gluc: 147 mg/dL / Ketone: x  / Bili: x / Urobili: x   Blood: x / Protein: x / Nitrite: x   Leuk Esterase: x / RBC: x / WBC x   Sq Epi: x / Non Sq Epi: x / Bacteria: x      Arterial Blood Gas:  12-19 @ 14:17  7.29/71/48/34/83.5/6.4  ABG lactate: --    Venous Blood Gas:  12-20 @ 16:54  7.33/61/164/32/100.0  VBG Lactate: 2.5  Venous Blood Gas:  12-20 @ 07:08  7.26/75/33/34/55.1  VBG Lactate: 1.5  Venous Blood Gas:  12-19 @ 07:09  7.29/70/52/34/83.2  VBG Lactate: 1.5    < from: TTE W or WO Ultrasound Enhancing Agent (12.19.23 @ 14:23) >  TRANSTHORACIC ECHOCARDIOGRAM REPORT  ________________________________________________________________________________                                      _______       Pt. Name:       MARISOL MORRELL Study Date:    12/19/2023  MRN:            [id number]     YOB: 1940  Accession #:    [id number]      Age:           83 years  Account#:       [id number]   Gender:        F  Heart Rate:     87 bpm         Height:        64.00 in (162.56 cm)  Rhythm:         sinus rhythm   Weight:   221.00 lb (100.25 kg)  Blood Pressure: 133/65 mmHg    BSA/BMI:       2.04 m² / 37.93 kg/m²  ________________________________________________________________________________________  Referring Physician:    [id number] Johann Mederos  Interpreting Physician: Jacquelyn David  Primary Sonographer:    Dami Lassiter RDCS    CPT:                ECHO TTE WITH CON COMP W DOPP - .m;DEFINITY ECHO                      CONTRAST PER ML - .m;DEFINITY ECHO CONTRAST PER ML                      WASTED - .m  Indication(s):      Heart failure, unspecified - I50.9  Procedure:          Transthoracic echocardiogram with 2-D, M-mode and complete                      spectral and color flow Doppler.  Ordering Location:  [facility] ([place])  Admission Status:   Inpatient  Contrast Injection: Verbal consent was obtained for injection of Ultrasonic                      Enhancing Agent following a discussion of risks and                      benefits.                      Endocardial visualization enhanced with 2 ml of Definity                      Ultrasound enhancing agent (Lot#:[id number] Exp.Date:1DEC24                      Discarded Dose:8ml).  UEA Reaction:       Patient had no adverse reaction after injection of                      Ultrasound Enhancing Agent.  Study Information:  Image quality for this study is fair.    _______________________________________________________________________________________     CONCLUSIONS:      1. Left ventricular cavity is normal. Left ventricular systolic function is low normal with an ejection fraction of 51 % by Villagomez's method of disks.   2. The left ventricular diastolic function is indeterminate.   3. Normal right ventricular cavity size, wall thickness, and systolic function.   4. The left atrium is moderately dilated.   5. The right atrium is mildly dilated in size.   6. There is mild mitral regurgitation. The mitral regurgitant jet is eccentrically directed.   7. There is trace tricuspid regurgitation. There is insufficient tricuspid regurgitation detected tocalculate pulmonary artery systolic pressure.   8. No pericardial effusion seen.   9. No prior echocardiogram is available for comparison.    ________________________________________________________________________________________  FINDINGS:     LeftVentricle:  After obtaining consent, Definity ultrasound enhancing agent was given for enhanced left ventricular opacification and improved delineation of the left ventricular endocardial borders. The left ventricular cavity is normal. Left ventricular systolic function is low normal with a calculated ejection fraction of 51 % by the Villagomez's biplane method of disks. The left ventricular diastolic function is indeterminate. No left ventricular hypertrophy. There is normal LV mass and normal geometry.     Right Ventricle:  After obtaining consent, Definity intravenous contrast was given for enhanced right heart opacification and improved delineation of the endocardial borders. The right ventricular cavity is normal in size, normal wall thickness and normal systolic function. Tricuspid annular plane systolic excursion (TAPSE) is 1.9 cm (normal >=1.7 cm). Tricuspid annular tissue Doppler S' is 13.3 cm/s (normal >10 cm/s).     Left Atrium:  The left atrium is moderately dilated with an indexed volume of 44.10 ml/m².     Right Atrium:  The right atrium is mildly dilated in size with an indexed volume of 27.68 ml/m².     Interatrial Septum:  The interatrial septum appears intact.     Aortic Valve:  The aortic valve is tricuspid with normalleaflet excursion. There is no aortic valve stenosis. There is trace aortic regurgitation.     Mitral Valve:  Structurally normal mitral valve with normal leaflet excursion. There is no mitral valve stenosis. There is mild mitral regurgitation. The mitral regurgitant jet is eccentrically directed.     Tricuspid Valve:  Structurally normal tricuspid valve with normal leaflet excursion. There is trace tricuspid regurgitation. There is insufficient tricuspid regurgitation detected to calculate pulmonary artery systolic pressure.     Pulmonic Valve:  Structurally normal pulmonic valve with normal leaflet excursion. There is trace pulmonic regurgitation.     Aorta:  The aortic annulus and aortic root appear normal in size.     Pericardium:  No pericardial effusion seen.     Systemic Veins:  The inferior vena cava is dilated measuring 2.46 cm in diameter, (dilated >2.1cm) with normal inspiratory collapse (normal >50%) consistent with mildly elevated right atrial pressure (~8, range 5-10mmHg).  ____________________________________________________________________  QUANTITATIVE DATA:  Left Ventricle Measurements: (Indexed to BSA)     IVSd (2D):   0.8 cm  LVPWd (2D):  0.8 cm  LVIDd (2D):  4.8 cm  LVIDs (2D):  3.6 cm  LV Mass:     134 g  65.8 g/m²  LV Vol d, MOD A2C: 131.0 ml 64.19 ml/m²  LV Vol d, MOD A4C: 154.0 ml 75.46 ml/m²  LV Vol d, MOD BP:  142.4 ml 69.79 ml/m²  LV Vol s, MOD A2C: 60.3 ml  29.55 ml/m²  LV Vol s, MOD A4C: 73.9 ml  36.21 ml/m²  LV Vol s, MOD BP:  69.3 ml  33.95 ml/m²  LVOT SV MOD BP:    73.1 ml  LV EF% MOD BP:     51 %     MV E Vmax:    0.88 m/s  MV A Vmax:    0.98 m/s  MV E/A:       0.89  e' lateral:   6.53 cm/s  e' medial:    6.31 cm/s  E/e' lateral: 13.48  E/e' medial:  13.95  E/e' Average: 13.71    Aorta Measurements: (normal range) (Indexed to BSA)     Sinuses of Valsalva: 3.30 cm (2.7 - 3.3 cm)  Ao Asc prox:         3.40 cm  Ao Arch:             2.3 cm       Left Atrium Measurements: (Indexed to BSA)  LA Diam 2D:        3.80 cm  LA Vol s, MOD A4C: 71.90 ml.  LA Vol s, MOD A2C: 108.00 ml.  LA Vol s, MOD BP:  90.00 ml   44.10 ml/m²    Right Ventricle Measurements: Right Atrial Measurements:     TAPSE:           1.9 cm       RA Vol:       56.50 ml  RV Base (RVID1): 3.4 cm       RA Vol Index: 27.68 ml/m²  RV Mid (RVID2):  2.8 cm       LVOT / RVOT/ Qp/Qs Data: (Indexed to BSA)  LVOT Diameter: 2.10 cm  LVOT Vmax:     1.05 m/s  LVOT VTI:      19.40 cm  LVOT SV:       67.2 ml  32.93 ml/m²    Mitral Valve Measurements:     MV E Vmax: 0.9 m/s  MV A Vmax: 1.0m/s  MV E/A:    0.9       Tricuspid Valve Measurements:     RA Pressure: 8 mmHg    ________________________________________________________________________________________  Electronically signed on 12/19/2023 at 5:56:31 PM by Jacquelyn David       < from: CT Chest No Cont (12.18.23 @ 12:16) >    ACC: [id number] EXAM:  CT CHEST   ORDERED BY: DYLAN CAMPBELL     PROCEDURE DATE:  12/18/2023          INTERPRETATION:  INDICATION: Hypoxia,RSV positive    TECHNIQUE: Helical acquisition images of the chest without intravenous   contrast. Maximum intensity projection images were generated.    COMPARISON: None.    FINDINGS:    LUNGS/AIRWAYS/PLEURA: Nonobstructing tracheal secretions. No   endobronchial lesion. Small clustered and branching centrilobular nodules   in the upper lobes and superior segments of both lower lobes. No pleural   abnormality.    LYMPH NODES/MEDIASTINUM: No lymphadenopathy. Calcified right paratracheal   lymph node, likely prior granulomatous disease.    HEART/VASCULATURE: Normal heart size. No pericardial effusion. Normal   caliber aorta. Heavily calcified coronary arteries.    UPPER ABDOMEN: Atrophic kidneys. Small left renal cyst. Small   calcification in the peripheral aspect of the right kidney.    BONES/SOFT TISSUES: Right mastectomy. Degenerative sclerosis inthe spine.      IMPRESSION:    Small airways process involving both lungs compatible with infectious   bronchiolitis.    --- End of Report ---    < end of copied text >    ***Final ***    < end of copied text >    MICROBIOLOGY:     RADIOLOGY:  [ ] Reviewed and interpreted by me    PULMONARY FUNCTION TESTS:    EKG:   Patient is a 83y old  Female who presents with a chief complaint of dysonea , cough - - - - -h/o  renal disease on HD            CONSTITUTIONAL: NAD, obese female on NC   RESPIRATORY: Normal respiratory effort; lungs b/l wheezing    CARDIOVASCULAR: Regular rate and rhythm, normal S1 and S2, no murmur/rub/gallop; No lower extremity edema; Peripheral pulses are 2+ bilaterally  ABDOMEN: Nontender to palpation, normoactive bowel sounds, no rebound/guarding; No hepatosplenomegaly  MSK: no clubbing or cyanosis of digits; no joint swelling or tenderness to palpation, 1+ pitting edema b/l   PSYCH: A+O to person, place, and time; affect appropriate    [ ] Unable to assess ROS because ________    OBJECTIVE:  ICU Vital Signs Last 24 Hrs  T(C): 36.3 (20 Dec 2023 13:00), Max: 36.8 (20 Dec 2023 00:34)  T(F): 97.4 (20 Dec 2023 13:00), Max: 98.3 (20 Dec 2023 00:34)  HR: 63 (20 Dec 2023 13:00) (63 - 85)  BP: 171/68 (20 Dec 2023 13:00) (123/66 - 171/68)  BP(mean): --  ABP: --  ABP(mean): --  RR: 16 (20 Dec 2023 13:00) (16 - 18)  SpO2: 100% (20 Dec 2023 13:00) (96% - 100%)    O2 Parameters below as of 20 Dec 2023 13:00  Patient On (Oxygen Delivery Method): nasal cannula  O2 Flow (L/min): 2            12-20 @ 07:01  -  12-20 @ 17:08  --------------------------------------------------------  IN: 0 mL / OUT: 1300 mL / NET: -1300 mL      CAPILLARY BLOOD GLUCOSE      POCT Blood Glucose.: 253 mg/dL (20 Dec 2023 16:55)        HOSPITAL MEDICATIONS:  MEDICATIONS  (STANDING):  albuterol/ipratropium for Nebulization 3 milliLiter(s) Nebulizer every 6 hours  atorvastatin 20 milliGRAM(s) Oral at bedtime  azithromycin  IVPB 500 milliGRAM(s) IV Intermittent every 24 hours  benzonatate 100 milliGRAM(s) Oral three times a day  cefTRIAXone   IVPB 1000 milliGRAM(s) IV Intermittent every 24 hours  chlorhexidine 2% Cloths 1 Application(s) Topical <User Schedule>  dextrose 5%. 1000 milliLiter(s) (100 mL/Hr) IV Continuous <Continuous>  dextrose 50% Injectable 25 Gram(s) IV Push once  famotidine    Tablet 20 milliGRAM(s) Oral daily  glucagon  Injectable 1 milliGRAM(s) IntraMuscular once  heparin   Injectable 5000 Unit(s) SubCutaneous every 12 hours  influenza  Vaccine (HIGH DOSE) 0.7 milliLiter(s) IntraMuscular once  insulin glargine Injectable (LANTUS) 12 Unit(s) SubCutaneous at bedtime  insulin lispro (ADMELOG) corrective regimen sliding scale   SubCutaneous three times a day before meals  insulin lispro (ADMELOG) corrective regimen sliding scale   SubCutaneous at bedtime  levothyroxine 75 MICROGram(s) Oral daily  mupirocin 2% Nasal 1 Application(s) Both Nostrils two times a day  predniSONE   Tablet 40 milliGRAM(s) Oral daily  sevelamer carbonate 800 milliGRAM(s) Oral <User Schedule>    MEDICATIONS  (PRN):  guaiFENesin Oral Liquid (Sugar-Free) 100 milliGRAM(s) Oral every 6 hours PRN Cough      LABS:                        10.7   8.04  )-----------( 222      ( 20 Dec 2023 06:59 )             33.3     12-20    134<L>  |  92<L>  |  36<H>  ----------------------------<  147<H>  4.1   |  29  |  5.35<H>    Ca    9.6      20 Dec 2023 06:59  Phos  4.8     12-20  Mg     2.2     12-20    TPro  6.7  /  Alb  3.5  /  TBili  0.2  /  DBili  x   /  AST  22  /  ALT  22  /  AlkPhos  83  12-20      Urinalysis Basic - ( 20 Dec 2023 06:59 )    Color: x / Appearance: x / SG: x / pH: x  Gluc: 147 mg/dL / Ketone: x  / Bili: x / Urobili: x   Blood: x / Protein: x / Nitrite: x   Leuk Esterase: x / RBC: x / WBC x   Sq Epi: x / Non Sq Epi: x / Bacteria: x      Arterial Blood Gas:  12-19 @ 14:17  7.29/71/48/34/83.5/6.4  ABG lactate: --    Venous Blood Gas:  12-20 @ 16:54  7.33/61/164/32/100.0  VBG Lactate: 2.5  Venous Blood Gas:  12-20 @ 07:08  7.26/75/33/34/55.1  VBG Lactate: 1.5  Venous Blood Gas:  12-19 @ 07:09  7.29/70/52/34/83.2  VBG Lactate: 1.5    < from: TTE W or WO Ultrasound Enhancing Agent (12.19.23 @ 14:23) >  TRANSTHORACIC ECHOCARDIOGRAM REPORT  ________________________________________________________________________________                                      _______       Pt. Name:       MARISOL MORRELL Study Date:    12/19/2023  MRN:            [id number]     YOB: 1940  Accession #:    [id number]      Age:           83 years  Account#:       [id number]   Gender:        F  Heart Rate:     87 bpm         Height:        64.00 in (162.56 cm)  Rhythm:         sinus rhythm   Weight:   221.00 lb (100.25 kg)  Blood Pressure: 133/65 mmHg    BSA/BMI:       2.04 m² / 37.93 kg/m²  ________________________________________________________________________________________  Referring Physician:    [id number] Johann Mederos  Interpreting Physician: Jacquelyn David  Primary Sonographer:    Dami Lassiter RDCS    CPT:                ECHO TTE WITH CON COMP W DOPP - .m;DEFINITY ECHO                      CONTRAST PER ML - .m;DEFINITY ECHO CONTRAST PER ML                      WASTED - .m  Indication(s):      Heart failure, unspecified - I50.9  Procedure:          Transthoracic echocardiogram with 2-D, M-mode and complete                      spectral and color flow Doppler.  Ordering Location:  [facility]  Admission Status:   Inpatient  Contrast Injection: Verbal consent was obtained for injection of Ultrasonic                      Enhancing Agent following a discussion of risks and                      benefits.                      Endocardial visualization enhanced with 2 ml of Definity                      Ultrasound enhancing agent (Lot#:[id number] Exp.Date:1DEC24                      Discarded Dose:8ml).  UEA Reaction:       Patient had no adverse reaction after injection of                      Ultrasound Enhancing Agent.  Study Information:  Image quality for this study is fair.    _______________________________________________________________________________________     CONCLUSIONS:      1. Left ventricular cavity is normal. Left ventricular systolic function is low normal with an ejection fraction of 51 % by Villagomez's method of disks.   2. The left ventricular diastolic function is indeterminate.   3. Normal right ventricular cavity size, wall thickness, and systolic function.   4. The left atrium is moderately dilated.   5. The right atrium is mildly dilated in size.   6. There is mild mitral regurgitation. The mitral regurgitant jet is eccentrically directed.   7. There is trace tricuspid regurgitation. There is insufficient tricuspid regurgitation detected tocalculate pulmonary artery systolic pressure.   8. No pericardial effusion seen.   9. No prior echocardiogram is available for comparison.    ________________________________________________________________________________________  FINDINGS:     LeftVentricle:  After obtaining consent, Definity ultrasound enhancing agent was given for enhanced left ventricular opacification and improved delineation of the left ventricular endocardial borders. The left ventricular cavity is normal. Left ventricular systolic function is low normal with a calculated ejection fraction of 51 % by the Villagomez's biplane method of disks. The left ventricular diastolic function is indeterminate. No left ventricular hypertrophy. There is normal LV mass and normal geometry.     Right Ventricle:  After obtaining consent, Definity intravenous contrast was given for enhanced right heart opacification and improved delineation of the endocardial borders. The right ventricular cavity is normal in size, normal wall thickness and normal systolic function. Tricuspid annular plane systolic excursion (TAPSE) is 1.9 cm (normal >=1.7 cm). Tricuspid annular tissue Doppler S' is 13.3 cm/s (normal >10 cm/s).     Left Atrium:  The left atrium is moderately dilated with an indexed volume of 44.10 ml/m².     Right Atrium:  The right atrium is mildly dilated in size with an indexed volume of 27.68 ml/m².     Interatrial Septum:  The interatrial septum appears intact.     Aortic Valve:  The aortic valve is tricuspid with normalleaflet excursion. There is no aortic valve stenosis. There is trace aortic regurgitation.     Mitral Valve:  Structurally normal mitral valve with normal leaflet excursion. There is no mitral valve stenosis. There is mild mitral regurgitation. The mitral regurgitant jet is eccentrically directed.     Tricuspid Valve:  Structurally normal tricuspid valve with normal leaflet excursion. There is trace tricuspid regurgitation. There is insufficient tricuspid regurgitation detected to calculate pulmonary artery systolic pressure.     Pulmonic Valve:  Structurally normal pulmonic valve with normal leaflet excursion. There is trace pulmonic regurgitation.     Aorta:  The aortic annulus and aortic root appear normal in size.     Pericardium:  No pericardial effusion seen.     Systemic Veins:  The inferior vena cava is dilated measuring 2.46 cm in diameter, (dilated >2.1cm) with normal inspiratory collapse (normal >50%) consistent with mildly elevated right atrial pressure (~8, range 5-10mmHg).  ____________________________________________________________________  QUANTITATIVE DATA:  Left Ventricle Measurements: (Indexed to BSA)     IVSd (2D):   0.8 cm  LVPWd (2D):  0.8 cm  LVIDd (2D):  4.8 cm  LVIDs (2D):  3.6 cm  LV Mass:     134 g  65.8 g/m²  LV Vol d, MOD A2C: 131.0 ml 64.19 ml/m²  LV Vol d, MOD A4C: 154.0 ml 75.46 ml/m²  LV Vol d, MOD BP:  142.4 ml 69.79 ml/m²  LV Vol s, MOD A2C: 60.3 ml  29.55 ml/m²  LV Vol s, MOD A4C: 73.9 ml  36.21 ml/m²  LV Vol s, MOD BP:  69.3 ml  33.95 ml/m²  LVOT SV MOD BP:    73.1 ml  LV EF% MOD BP:     51 %     MV E Vmax:    0.88 m/s  MV A Vmax:    0.98 m/s  MV E/A:       0.89  e' lateral:   6.53 cm/s  e' medial:    6.31 cm/s  E/e' lateral: 13.48  E/e' medial:  13.95  E/e' Average: 13.71    Aorta Measurements: (normal range) (Indexed to BSA)     Sinuses of Valsalva: 3.30 cm (2.7 - 3.3 cm)  Ao Asc prox:         3.40 cm  Ao Arch:             2.3 cm       Left Atrium Measurements: (Indexed to BSA)  LA Diam 2D:        3.80 cm  LA Vol s, MOD A4C: 71.90 ml.  LA Vol s, MOD A2C: 108.00 ml.  LA Vol s, MOD BP:  90.00 ml   44.10 ml/m²    Right Ventricle Measurements: Right Atrial Measurements:     TAPSE:           1.9 cm       RA Vol:       56.50 ml  RV Base (RVID1): 3.4 cm       RA Vol Index: 27.68 ml/m²  RV Mid (RVID2):  2.8 cm       LVOT / RVOT/ Qp/Qs Data: (Indexed to BSA)  LVOT Diameter: 2.10 cm  LVOT Vmax:     1.05 m/s  LVOT VTI:      19.40 cm  LVOT SV:       67.2 ml  32.93 ml/m²    Mitral Valve Measurements:     MV E Vmax: 0.9 m/s  MV A Vmax: 1.0m/s  MV E/A:    0.9       Tricuspid Valve Measurements:     RA Pressure: 8 mmHg    ________________________________________________________________________________________  Electronically signed on 12/19/2023 at 5:56:31 PM by Jacquelyn David       < from: CT Chest No Cont (12.18.23 @ 12:16) >    ACC: [id number] EXAM:  CT CHEST   ORDERED BY: DYLAN CAMPBELL     PROCEDURE DATE:  12/18/2023          INTERPRETATION:  INDICATION: Hypoxia,RSV positive    TECHNIQUE: Helical acquisition images of the chest without intravenous   contrast. Maximum intensity projection images were generated.    COMPARISON: None.    FINDINGS:    LUNGS/AIRWAYS/PLEURA: Nonobstructing tracheal secretions. No   endobronchial lesion. Small clustered and branching centrilobular nodules   in the upper lobes and superior segments of both lower lobes. No pleural   abnormality.    LYMPH NODES/MEDIASTINUM: No lymphadenopathy. Calcified right paratracheal   lymph node, likely prior granulomatous disease.    HEART/VASCULATURE: Normal heart size. No pericardial effusion. Normal   caliber aorta. Heavily calcified coronary arteries.    UPPER ABDOMEN: Atrophic kidneys. Small left renal cyst. Small   calcification in the peripheral aspect of the right kidney.    BONES/SOFT TISSUES: Right mastectomy. Degenerative sclerosis inthe spine.      IMPRESSION:    Small airways process involving both lungs compatible with infectious   bronchiolitis.    --- End of Report ---    < end of copied text >    ***Final ***    < end of copied text >    MICROBIOLOGY:     RADIOLOGY:  [ ] Reviewed and interpreted by me    PULMONARY FUNCTION TESTS:    EKG:   Patient is a 83y old  Female who presents with a chief complaint of dysonea , cough - - - - -h/o  renal disease on HD            CONSTITUTIONAL: NAD, obese female on NC   RESPIRATORY: Normal respiratory effort; lungs b/l wheezing    CARDIOVASCULAR: Regular rate and rhythm, normal S1 and S2, no murmur/rub/gallop; No lower extremity edema; Peripheral pulses are 2+ bilaterally  ABDOMEN: Nontender to palpation, normoactive bowel sounds, no rebound/guarding; No hepatosplenomegaly  MSK: no clubbing or cyanosis of digits; no joint swelling or tenderness to palpation, 1+ pitting edema b/l   PSYCH: A+O to person, place, and time; affect appropriate    [ ] Unable to assess ROS because ________    OBJECTIVE:  ICU Vital Signs Last 24 Hrs  T(C): 36.3 (20 Dec 2023 13:00), Max: 36.8 (20 Dec 2023 00:34)  T(F): 97.4 (20 Dec 2023 13:00), Max: 98.3 (20 Dec 2023 00:34)  HR: 63 (20 Dec 2023 13:00) (63 - 85)  BP: 171/68 (20 Dec 2023 13:00) (123/66 - 171/68)  BP(mean): --  ABP: --  ABP(mean): --  RR: 16 (20 Dec 2023 13:00) (16 - 18)  SpO2: 100% (20 Dec 2023 13:00) (96% - 100%)    O2 Parameters below as of 20 Dec 2023 13:00  Patient On (Oxygen Delivery Method): nasal cannula  O2 Flow (L/min): 2            12-20 @ 07:01  -  12-20 @ 17:08  --------------------------------------------------------  IN: 0 mL / OUT: 1300 mL / NET: -1300 mL      CAPILLARY BLOOD GLUCOSE      POCT Blood Glucose.: 253 mg/dL (20 Dec 2023 16:55)        HOSPITAL MEDICATIONS:  MEDICATIONS  (STANDING):  albuterol/ipratropium for Nebulization 3 milliLiter(s) Nebulizer every 6 hours  atorvastatin 20 milliGRAM(s) Oral at bedtime  azithromycin  IVPB 500 milliGRAM(s) IV Intermittent every 24 hours  benzonatate 100 milliGRAM(s) Oral three times a day  cefTRIAXone   IVPB 1000 milliGRAM(s) IV Intermittent every 24 hours  chlorhexidine 2% Cloths 1 Application(s) Topical <User Schedule>  dextrose 5%. 1000 milliLiter(s) (100 mL/Hr) IV Continuous <Continuous>  dextrose 50% Injectable 25 Gram(s) IV Push once  famotidine    Tablet 20 milliGRAM(s) Oral daily  glucagon  Injectable 1 milliGRAM(s) IntraMuscular once  heparin   Injectable 5000 Unit(s) SubCutaneous every 12 hours  influenza  Vaccine (HIGH DOSE) 0.7 milliLiter(s) IntraMuscular once  insulin glargine Injectable (LANTUS) 12 Unit(s) SubCutaneous at bedtime  insulin lispro (ADMELOG) corrective regimen sliding scale   SubCutaneous three times a day before meals  insulin lispro (ADMELOG) corrective regimen sliding scale   SubCutaneous at bedtime  levothyroxine 75 MICROGram(s) Oral daily  mupirocin 2% Nasal 1 Application(s) Both Nostrils two times a day  predniSONE   Tablet 40 milliGRAM(s) Oral daily  sevelamer carbonate 800 milliGRAM(s) Oral <User Schedule>    MEDICATIONS  (PRN):  guaiFENesin Oral Liquid (Sugar-Free) 100 milliGRAM(s) Oral every 6 hours PRN Cough      LABS:                        10.7   8.04  )-----------( 222      ( 20 Dec 2023 06:59 )             33.3     12-20    134<L>  |  92<L>  |  36<H>  ----------------------------<  147<H>  4.1   |  29  |  5.35<H>    Ca    9.6      20 Dec 2023 06:59  Phos  4.8     12-20  Mg     2.2     12-20    TPro  6.7  /  Alb  3.5  /  TBili  0.2  /  DBili  x   /  AST  22  /  ALT  22  /  AlkPhos  83  12-20      Urinalysis Basic - ( 20 Dec 2023 06:59 )    Color: x / Appearance: x / SG: x / pH: x  Gluc: 147 mg/dL / Ketone: x  / Bili: x / Urobili: x   Blood: x / Protein: x / Nitrite: x   Leuk Esterase: x / RBC: x / WBC x   Sq Epi: x / Non Sq Epi: x / Bacteria: x      Arterial Blood Gas:  12-19 @ 14:17  7.29/71/48/34/83.5/6.4  ABG lactate: --    Venous Blood Gas:  12-20 @ 16:54  7.33/61/164/32/100.0  VBG Lactate: 2.5  Venous Blood Gas:  12-20 @ 07:08  7.26/75/33/34/55.1  VBG Lactate: 1.5  Venous Blood Gas:  12-19 @ 07:09  7.29/70/52/34/83.2  VBG Lactate: 1.5    < from: TTE W or WO Ultrasound Enhancing Agent (12.19.23 @ 14:23) >  TRANSTHORACIC ECHOCARDIOGRAM REPORT  ________________________________________________________________________________                                      _______       Pt. Name:       MARISOL MORRELL Study Date:    12/19/2023  MRN:            [id number]     YOB: 1940  Accession #:    [id number]      Age:           83 years  Account#:       [id number]   Gender:        F  Heart Rate:     87 bpm         Height:        64.00 in (162.56 cm)  Rhythm:         sinus rhythm   Weight:   221.00 lb (100.25 kg)  Blood Pressure: 133/65 mmHg    BSA/BMI:       2.04 m² / 37.93 kg/m²  ________________________________________________________________________________________  Referring Physician:    [id number] Johann Mederos  Interpreting Physician: Jacquelyn David  Primary Sonographer:    Dami Lassiter RDCS    CPT:                ECHO TTE WITH CON COMP W DOPP - .m;DEFINITY ECHO                      CONTRAST PER ML - .m;DEFINITY ECHO CONTRAST PER ML                      WASTED - .m  Indication(s):      Heart failure, unspecified - I50.9  Procedure:          Transthoracic echocardiogram with 2-D, M-mode and complete                      spectral and color flow Doppler.  Ordering Location:  [facility]  Admission Status:   Inpatient  Contrast Injection: Verbal consent was obtained for injection of Ultrasonic                      Enhancing Agent following a discussion of risks and                      benefits.                      Endocardial visualization enhanced with 2 ml of Definity                      Ultrasound enhancing agent (Lot#:[id number] Exp.Date:1DEC24                      Discarded Dose:8ml).  UEA Reaction:       Patient had no adverse reaction after injection of                      Ultrasound Enhancing Agent.  Study Information:  Image quality for this study is fair.    _______________________________________________________________________________________     CONCLUSIONS:      1. Left ventricular cavity is normal. Left ventricular systolic function is low normal with an ejection fraction of 51 % by Villagomez's method of disks.   2. The left ventricular diastolic function is indeterminate.   3. Normal right ventricular cavity size, wall thickness, and systolic function.   4. The left atrium is moderately dilated.   5. The right atrium is mildly dilated in size.   6. There is mild mitral regurgitation. The mitral regurgitant jet is eccentrically directed.   7. There is trace tricuspid regurgitation. There is insufficient tricuspid regurgitation detected tocalculate pulmonary artery systolic pressure.   8. No pericardial effusion seen.   9. No prior echocardiogram is available for comparison.    ________________________________________________________________________________________  FINDINGS:     LeftVentricle:  After obtaining consent, Definity ultrasound enhancing agent was given for enhanced left ventricular opacification and improved delineation of the left ventricular endocardial borders. The left ventricular cavity is normal. Left ventricular systolic function is low normal with a calculated ejection fraction of 51 % by the Villagomez's biplane method of disks. The left ventricular diastolic function is indeterminate. No left ventricular hypertrophy. There is normal LV mass and normal geometry.     Right Ventricle:  After obtaining consent, Definity intravenous contrast was given for enhanced right heart opacification and improved delineation of the endocardial borders. The right ventricular cavity is normal in size, normal wall thickness and normal systolic function. Tricuspid annular plane systolic excursion (TAPSE) is 1.9 cm (normal >=1.7 cm). Tricuspid annular tissue Doppler S' is 13.3 cm/s (normal >10 cm/s).     Left Atrium:  The left atrium is moderately dilated with an indexed volume of 44.10 ml/m².     Right Atrium:  The right atrium is mildly dilated in size with an indexed volume of 27.68 ml/m².     Interatrial Septum:  The interatrial septum appears intact.     Aortic Valve:  The aortic valve is tricuspid with normalleaflet excursion. There is no aortic valve stenosis. There is trace aortic regurgitation.     Mitral Valve:  Structurally normal mitral valve with normal leaflet excursion. There is no mitral valve stenosis. There is mild mitral regurgitation. The mitral regurgitant jet is eccentrically directed.     Tricuspid Valve:  Structurally normal tricuspid valve with normal leaflet excursion. There is trace tricuspid regurgitation. There is insufficient tricuspid regurgitation detected to calculate pulmonary artery systolic pressure.     Pulmonic Valve:  Structurally normal pulmonic valve with normal leaflet excursion. There is trace pulmonic regurgitation.     Aorta:  The aortic annulus and aortic root appear normal in size.     Pericardium:  No pericardial effusion seen.     Systemic Veins:  The inferior vena cava is dilated measuring 2.46 cm in diameter, (dilated >2.1cm) with normal inspiratory collapse (normal >50%) consistent with mildly elevated right atrial pressure (~8, range 5-10mmHg).  ____________________________________________________________________  QUANTITATIVE DATA:  Left Ventricle Measurements: (Indexed to BSA)     IVSd (2D):   0.8 cm  LVPWd (2D):  0.8 cm  LVIDd (2D):  4.8 cm  LVIDs (2D):  3.6 cm  LV Mass:     134 g  65.8 g/m²  LV Vol d, MOD A2C: 131.0 ml 64.19 ml/m²  LV Vol d, MOD A4C: 154.0 ml 75.46 ml/m²  LV Vol d, MOD BP:  142.4 ml 69.79 ml/m²  LV Vol s, MOD A2C: 60.3 ml  29.55 ml/m²  LV Vol s, MOD A4C: 73.9 ml  36.21 ml/m²  LV Vol s, MOD BP:  69.3 ml  33.95 ml/m²  LVOT SV MOD BP:    73.1 ml  LV EF% MOD BP:     51 %     MV E Vmax:    0.88 m/s  MV A Vmax:    0.98 m/s  MV E/A:       0.89  e' lateral:   6.53 cm/s  e' medial:    6.31 cm/s  E/e' lateral: 13.48  E/e' medial:  13.95  E/e' Average: 13.71    Aorta Measurements: (normal range) (Indexed to BSA)     Sinuses of Valsalva: 3.30 cm (2.7 - 3.3 cm)  Ao Asc prox:         3.40 cm  Ao Arch:             2.3 cm       Left Atrium Measurements: (Indexed to BSA)  LA Diam 2D:        3.80 cm  LA Vol s, MOD A4C: 71.90 ml.  LA Vol s, MOD A2C: 108.00 ml.  LA Vol s, MOD BP:  90.00 ml   44.10 ml/m²    Right Ventricle Measurements: Right Atrial Measurements:     TAPSE:           1.9 cm       RA Vol:       56.50 ml  RV Base (RVID1): 3.4 cm       RA Vol Index: 27.68 ml/m²  RV Mid (RVID2):  2.8 cm       LVOT / RVOT/ Qp/Qs Data: (Indexed to BSA)  LVOT Diameter: 2.10 cm  LVOT Vmax:     1.05 m/s  LVOT VTI:      19.40 cm  LVOT SV:       67.2 ml  32.93 ml/m²    Mitral Valve Measurements:     MV E Vmax: 0.9 m/s  MV A Vmax: 1.0m/s  MV E/A:    0.9       Tricuspid Valve Measurements:     RA Pressure: 8 mmHg    ________________________________________________________________________________________  Electronically signed on 12/19/2023 at 5:56:31 PM by Jacquelyn David       < from: CT Chest No Cont (12.18.23 @ 12:16) >    ACC: [id number] EXAM:  CT CHEST   ORDERED BY: DYLAN CAMPBELL     PROCEDURE DATE:  12/18/2023          INTERPRETATION:  INDICATION: Hypoxia,RSV positive    TECHNIQUE: Helical acquisition images of the chest without intravenous   contrast. Maximum intensity projection images were generated.    COMPARISON: None.    FINDINGS:    LUNGS/AIRWAYS/PLEURA: Nonobstructing tracheal secretions. No   endobronchial lesion. Small clustered and branching centrilobular nodules   in the upper lobes and superior segments of both lower lobes. No pleural   abnormality.    LYMPH NODES/MEDIASTINUM: No lymphadenopathy. Calcified right paratracheal   lymph node, likely prior granulomatous disease.    HEART/VASCULATURE: Normal heart size. No pericardial effusion. Normal   caliber aorta. Heavily calcified coronary arteries.    UPPER ABDOMEN: Atrophic kidneys. Small left renal cyst. Small   calcification in the peripheral aspect of the right kidney.    BONES/SOFT TISSUES: Right mastectomy. Degenerative sclerosis inthe spine.      IMPRESSION:    Small airways process involving both lungs compatible with infectious   bronchiolitis.    --- End of Report ---    < end of copied text >    ***Final ***    < end of copied text >    MICROBIOLOGY:     RADIOLOGY:  [ ] Reviewed and interpreted by me    PULMONARY FUNCTION TESTS:    EKG:

## 2023-12-20 NOTE — DISCHARGE NOTE PROVIDER - HOSPITAL COURSE
83F ESRD on HD Mon/Tues/Thurs/Fri, HLD, T2DM, hypothyroidism, presenting with productive cough and SOB for about 1 week, presented to the ED admitted for AHRF requiring BiPAP, likely secondary to RSV with possible superimposed PNA vs. volume overload. Pt met SIRS criteria on admission with tachypnea and leukocytosis found to have positive procalcitonin consistent with viral bronchitis and superimposed  PNA. Pt started on empiric abx coverage of azithromycin/ ceftriaxone for 3 and 5 days respectively. Pt remained a-febrile with no elevation in her WBC. Pt's VBG with gas concerning for chronic respiratory acidosis worsening in setting of viral and bacterial infection. Pt recommend to use CPAP at night as well as initiation of systemic steroids on 12/20 given wheezy pulmonary exam. Pt recieved HD while inpatient on 12/18 and 12/20 as per her outpatient schedule. Pt's echo without evidence of HF and normal EF at 51%. Pt's hemoglobin remained stable without need for EPO. Her electrolytes were repleted as needed. For her DM, lantus 12 and slide scale insulin were utilized with adequate control of her blood sugars. Pt's synthroid and atorvastatin were continued inpatient. PT evaluated the patient and ___________.          83F ESRD on HD Mon/Tues/Thurs/Fri, HLD, T2DM, hypothyroidism, presenting with productive cough and SOB for about 1 week, presented to the ED admitted for AHRF requiring BiPAP, likely secondary to RSV with possible superimposed PNA vs. volume overload. Pt met SIRS criteria on admission with tachypnea and leukocytosis found to have positive procalcitonin consistent with viral bronchitis and superimposed  PNA. Pt started on empiric abx coverage of azithromycin/ ceftriaxone for 3 and 5 days respectively. Pt remained a-febrile with no elevation in her WBC. Pt's VBG with gas concerning for chronic respiratory acidosis worsening in setting of viral and bacterial infection. Pt recommend to use CPAP at night as well as initiation of systemic steroids on 12/20 given wheezy pulmonary exam. Pt received HD while inpatient on 12/18 and 12/20 as per her outpatient schedule. Pt's echo without evidence of HF and normal EF at 51%. Pt's hemoglobin remained stable without need for EPO. Her electrolytes were repleted as needed. For her DM, lantus 12 and slide scale insulin were utilized with adequate control of her blood sugars. Pt's synthroid and atorvastatin were continued inpatient. PT evaluated the patient and ___________.   Pulmonology was consulted given pt's pC02 retention which was monitored with serial VBGs. She was started on solu-medrol on 12/21 as well with chest clearance with RT. Pt's mentation remained stable and found to be optimized for discharge on____________.                83F ESRD on HD Mon/Tues/Thurs/Fri, HLD, T2DM, hypothyroidism, presenting with productive cough and SOB for about 1 week, presented to the ED admitted for AHRF requiring BiPAP, likely secondary to RSV with possible superimposed PNA vs. volume overload. Pt met SIRS criteria on admission with tachypnea and leukocytosis found to have positive procalcitonin consistent with viral bronchitis and superimposed  PNA. Pt started on empiric abx coverage of azithromycin/ ceftriaxone for 3 and 5 days respectively. Pt remained a-febrile with no elevation in her WBC. Pt's VBG with gas concerning for chronic respiratory acidosis worsening in setting of viral and bacterial infection. Pt recommend to use CPAP at night as well as initiation of systemic steroids on 12/20 given wheezy pulmonary exam. Pt received HD while inpatient on 12/18 and 12/20 as per her outpatient schedule. Pt's echo without evidence of HF and normal EF at 51%. Pt's hemoglobin remained stable without need for EPO. Her electrolytes were repleted as needed. For her DM, lantus 12 and slide scale insulin were utilized with adequate control of her blood sugars. Pt's synthroid and atorvastatin were continued inpatient. PT evaluated the patient and found her cleared for Home PT.   Pulmonology was consulted given pt's pC02 retention which was monitored with serial VBGs. She was started on solu-medrol on 12/21 as well with chest clearance with RT. Pt's mentation remained stable and found to be optimized for discharge on 12/22 as her gas's remained stable. She can follow up outpatient with pulmonology and have PFT's done for possible home CPAP / BIPAP machine .                   83F ESRD on HD Mon/Tues/Thurs/Fri, HLD, T2DM, hypothyroidism, presenting with productive cough and SOB for about 1 week, presented to the ED admitted for AHRF requiring BiPAP, likely secondary to RSV with possible superimposed PNA vs. volume overload. Pt met SIRS criteria on admission with tachypnea and leukocytosis found to have positive procalcitonin consistent with viral bronchitis and superimposed  PNA. Pt started on empiric abx coverage of azithromycin/ ceftriaxone for 3 and 5 days respectively. Pt remained a-febrile with no elevation in her WBC. Pt's VBG with gas concerning for chronic respiratory acidosis worsening in setting of viral and bacterial infection. Pt recommend to use CPAP at night as well as initiation of systemic steroids on 12/20 given wheezy pulmonary exam. Pt received HD while inpatient on 12/18 and 12/20 as per her outpatient schedule. Pt's echo without evidence of HF and normal EF at 51%. Pt's hemoglobin remained stable without need for EPO. Her electrolytes were repleted as needed. For her DM, lantus 12 and slide scale insulin were utilized with adequate control of her blood sugars. Pt's synthroid and atorvastatin were continued inpatient. PT evaluated the patient and found her cleared for Home PT.   Pulmonology was consulted given pt's pC02 retention which was monitored with serial VBGs. She was started on solu-medrol on 12/21 as well with chest clearance with RT. Pt's mentation remained stable and found to be optimized for discharge on 12/22 as her gas's remained stable. She can follow up outpatient with pulmonology and have PFT's done for possible home CPAP / BIPAP machine . Rx for prednisone taper and script for nebulizer given to MANOLO Lewis also sent to the pharmacy.                      83F ESRD on HD Mon/Tues/Thurs/Fri, HLD, T2DM, hypothyroidism, presenting with productive cough and SOB for about 1 week, presented to the ED admitted for AHRF requiring BiPAP, likely secondary to RSV with possible superimposed PNA vs. volume overload. Pt met SIRS criteria on admission with tachypnea and leukocytosis found to have positive procalcitonin consistent with viral bronchitis and superimposed  PNA. Pt started on empiric abx coverage of azithromycin/ ceftriaxone for 3 and 5 days respectively. Pt remained a-febrile with no elevation in her WBC. Pt's VBG with gas concerning for chronic respiratory acidosis worsening in setting of viral and bacterial infection. Pt recommend to use CPAP at night as well as initiation of systemic steroids on 12/20 given wheezy pulmonary exam. Pt received HD while inpatient on 12/18 and 12/20 as per her outpatient schedule. Pt's echo without evidence of HF and normal EF at 51%. Pt's hemoglobin remained stable without need for EPO. Her electrolytes were repleted as needed. For her DM, lantus 12 and slide scale insulin were utilized with adequate control of her blood sugars. Pt's synthroid and atorvastatin were continued inpatient. PT evaluated the patient and found her cleared for Home PT.   Pulmonology was consulted given pt's pC02 retention which was monitored with serial VBGs. She was started on solu-medrol on 12/21 as well with chest clearance with RT. Pt's mentation remained stable and found to be optimized for discharge on 12/22 as her gas's remained stable. She can follow up outpatient with pulmonology and have PFT's done for possible home CPAP / BIPAP machine . Rx for prednisone taper and script for nebulizer given to MANOOL Lewis also sent to the pharmacy.                      83F ESRD on HD Mon/Tues/Thurs/Fri, HLD, T2DM, hypothyroidism, presenting with productive cough and SOB for about 1 week, presented to the ED admitted for AHRF requiring BiPAP, likely secondary to RSV with possible superimposed PNA vs. volume overload. Pt met SIRS criteria on admission with tachypnea and leukocytosis found to have positive procalcitonin consistent with viral bronchitis and superimposed  PNA. Pt started on empiric abx coverage of azithromycin/ ceftriaxone for 3 and 5 days respectively. Pt remained a-febrile with no elevation in her WBC. Pt's VBG with gas concerning for chronic respiratory acidosis worsening in setting of viral and bacterial infection. Pt recommend to use CPAP at night as well as initiation of systemic steroids on 12/20 given wheezy pulmonary exam. Pt received HD while inpatient on 12/18 and 12/20 as per her outpatient schedule. Pt's echo without evidence of HF and normal EF at 51%. Pt's hemoglobin remained stable without need for EPO. Her electrolytes were repleted as needed. For her DM, lantus 12 and slide scale insulin were utilized with adequate control of her blood sugars. Pt's synthroid and atorvastatin were continued inpatient. PT evaluated the patient and found her cleared for Home PT.   Pulmonology was consulted given pt's pC02 retention which was monitored with serial VBGs. She was started on solu-medrol on 12/21 as well with chest clearance with RT. Pt's mentation remained stable and found to be optimized for discharge on 12/22 as her gas's remained stable. She can follow up outpatient with pulmonology and have PFT's done for possible home CPAP / BIPAP machine . Rx for prednisone taper and script for nebulizer given to MANOLO Lewis also sent to the pharmacy.         F/U  -Pulm appointment  Dr. Lisker 12/25 at 2:15 PM

## 2023-12-20 NOTE — DISCHARGE NOTE PROVIDER - NSDCCPCAREPLAN_GEN_ALL_CORE_FT
PRINCIPAL DISCHARGE DIAGNOSIS  Diagnosis: Acute hypoxic respiratory failure  Assessment and Plan of Treatment: You were found to have a viral bronchiolits with superimposed bacterial infection contributing to your respiratory failure. You were treated with a BIPAP machine which helped your oxygenation. You were weaned to nasal cannula with good oxygenation. For your increasing co2, you were placed on CPAP at night along with continuation of antibiotics as well as steroids.   Please follow up with your primary care doctor or a pulmunologist for consideration of sleep study for evaluation of obstructive sleep apnea and possible home CPAP machine.  Return to the ED if you have worsening CP, SOB, abd pain, fevers or other complaints.      SECONDARY DISCHARGE DIAGNOSES  Diagnosis: ESRD on dialysis  Assessment and Plan of Treatment: You were found to have fluid overload in setting of ESRD for which you required inpatient dialysis and tolerated well.  Please continue outpatient dialysis sessions as prior.       PRINCIPAL DISCHARGE DIAGNOSIS  Diagnosis: Acute hypoxic respiratory failure  Assessment and Plan of Treatment: You were found to have a viral bronchiolits with superimposed bacterial infection contributing to your respiratory failure. You were treated with a BIPAP machine which helped your oxygenation. You were weaned to nasal cannula with good oxygenation. For your increasing co2, you were placed on CPAP at night along with continuation of antibiotics as well as steroids.   Please follow up with your primary care doctor or a pulmunologist for consideration of sleep study for evaluation of obstructive sleep apnea and possible home CPAP machine. You will be sent home with a few days of prednisone.   We have listed 2 pulmunologist's you can choose to follow with: Dr. Mcnamara or Dr. Do. Please choose the option best suited for you.   Return to the ED if you have worsening CP, SOB, abd pain, fevers or other complaints.      SECONDARY DISCHARGE DIAGNOSES  Diagnosis: ESRD on dialysis  Assessment and Plan of Treatment: You were found to have fluid overload in setting of ESRD for which you required inpatient dialysis and tolerated well. You did not get dialysis inpatient on 12/22 day of discharge, please resume your dialysis at home on the morning of 12/23 and return to your prior dialysis schedule of M/T/T/F.   Please continue outpatient dialysis sessions as prior.       PRINCIPAL DISCHARGE DIAGNOSIS  Diagnosis: Acute hypoxic respiratory failure  Assessment and Plan of Treatment: You were found to have a viral bronchiolits with superimposed bacterial infection contributing to your respiratory failure. You were treated with a BIPAP machine which helped your oxygenation. You were weaned to nasal cannula with good oxygenation. For your increasing co2, you were placed on CPAP at night along with continuation of antibiotics as well as steroids.   Please follow up with your primary care doctor or a pulmunologist for consideration of sleep study for evaluation of obstructive sleep apnea and possible home CPAP machine. You will be sent home with a few days of prednisone taper as well as duo-nebs sent to the pharmacy.   Please follow up with Dr. Do for your office appointment.  Take prednisone 4 of the 10mg tablets from 12/23- 12/25  take 3 of the 10mg tablets from 12/26-12/27  take 2 of the 10mg tablets daily from 12/27 until the time of your appointment with Dr. Do   Return to the ED if you have worsening CP, SOB, abd pain, fevers or other complaints.      SECONDARY DISCHARGE DIAGNOSES  Diagnosis: ESRD on dialysis  Assessment and Plan of Treatment: You were found to have fluid overload in setting of ESRD for which you required inpatient dialysis and tolerated well. You did not get dialysis inpatient on 12/22 day of discharge, please resume your dialysis at home on the morning of 12/23 and return to your prior dialysis schedule of M/T/T/F.   Please continue outpatient dialysis sessions as prior.       PRINCIPAL DISCHARGE DIAGNOSIS  Diagnosis: Acute hypoxic respiratory failure  Assessment and Plan of Treatment: You were found to have a viral bronchiolits with superimposed bacterial infection contributing to your respiratory failure. You were treated with a BIPAP machine which helped your oxygenation. You were weaned to nasal cannula with good oxygenation. For your increasing co2, you were placed on CPAP at night along with continuation of antibiotics as well as steroids.   Please follow up with your primary care doctor or a pulmunologist for consideration of sleep study for evaluation of obstructive sleep apnea and possible home CPAP machine. You will be sent home with a few days of prednisone taper as well as duo-nebs sent to the pharmacy. You can use the nebulizer machine every 6 hours   Please follow up with Dr. Do for your office appointment.  Take prednisone 4 of the 10mg tablets from 12/23- 12/25  take 3 of the 10mg tablets from 12/26-12/27  take 2 of the 10mg tablets daily from 12/27 until the time of your appointment with Dr. Do   Return to the ED if you have worsening CP, SOB, abd pain, fevers or other complaints.      SECONDARY DISCHARGE DIAGNOSES  Diagnosis: ESRD on dialysis  Assessment and Plan of Treatment: You were found to have fluid overload in setting of ESRD for which you required inpatient dialysis and tolerated well. You did not get dialysis inpatient on 12/22 day of discharge, please resume your dialysis at home on the morning of 12/23 and return to your prior dialysis schedule of M/T/T/F.   Please continue outpatient dialysis sessions as prior.       PRINCIPAL DISCHARGE DIAGNOSIS  Diagnosis: Acute hypoxic respiratory failure  Assessment and Plan of Treatment: You were found to have a viral bronchiolits with superimposed bacterial infection contributing to your respiratory failure. You were treated with a BIPAP machine which helped your oxygenation. You were weaned to nasal cannula with good oxygenation. For your increasing co2, you were placed on CPAP at night along with continuation of antibiotics as well as steroids.   Please follow up with your primary care doctor or a pulmunologist for consideration of sleep study for evaluation of obstructive sleep apnea and possible home CPAP machine. You will be sent home with a few days of prednisone taper as well as duo-nebs sent to the pharmacy. You can use the nebulizer machine every 6 hours   Please follow up with Dr. Lisker for your office appointment 12/25   Take prednisone 4 of the 10mg tablets from 12/23- 12/25  take 3 of the 10mg tablets from 12/26-12/27  Return to the ED if you have worsening CP, SOB, abd pain, fevers or other complaints.      SECONDARY DISCHARGE DIAGNOSES  Diagnosis: ESRD on dialysis  Assessment and Plan of Treatment: You were found to have fluid overload in setting of ESRD for which you required inpatient dialysis and tolerated well. You did not get dialysis inpatient on 12/22 day of discharge, please resume your dialysis at home on the morning of 12/23 and return to your prior dialysis schedule of M/T/T/F.   Please continue outpatient dialysis sessions as prior.

## 2023-12-20 NOTE — DISCHARGE NOTE PROVIDER - NSDCFUSCHEDAPPT_GEN_ALL_CORE_FT
Jonatan Snider  [facility]  [address] [facility] [address]  Scheduled Appointment: 12/26/2023    [facility]  DIAGRAD [address] [facility]  Scheduled Appointment: 01/12/2024    Erick Chester  [facility]  OTOLARYNG [address] [facility]  Scheduled Appointment: 01/17/2024     Jonatan Snider  [facility]  [address] [facility]  Scheduled Appointment: 12/26/2023    [facility]  [address]RAD [address] [facility]  Scheduled Appointment: 01/12/2024    Erick Chester  [facility]  [address] [facility]  Scheduled Appointment: 01/17/2024

## 2023-12-20 NOTE — PROGRESS NOTE ADULT - PROBLEM SELECTOR PLAN 4
Elevated troponin to 313 on admission to 317, decreasing to 37   Chest pain-free, EKG on admission with sinus tach, LAD, no ST segment changes   Low suspicion for ACS at this time, trops likely elevated from ESRD +/- demand of sepsis    - monitor tele   - TTE eval for [facility] Elevated troponin to 313 on admission to 317, decreasing to 37   Chest pain-free, EKG on admission with sinus tach, LAD, no ST segment changes   Low suspicion for ACS at this time, trops likely elevated from ESRD +/- demand of sepsis    - monitor tele   - echo: WNL EF 51%  - TTE eval for [facility]

## 2023-12-20 NOTE — PROGRESS NOTE ADULT - SUBJECTIVE AND OBJECTIVE BOX
PROGRESS NOTE:   Authored by Dr. Kimberlee Moscoso MD (PGY-1). Pager [facility] [phone] / [place] ( [id number]) or via TEAMS    Patient is a 83y old  Female who presents with a chief complaint of AHRF (19 Dec 2023 07:08)      SUBJECTIVE / OVERNIGHT EVENTS:  No acute events overnight.     ADDITIONAL REVIEW OF SYSTEMS:  Patient denies fevers, chills, chest pain, shortness of breath, nausea, abdominal pain, diarrhea, constipation, dysuria, leg swelling, headache, light headedness.    MEDICATIONS  (STANDING):  albuterol/ipratropium for Nebulization 3 milliLiter(s) Nebulizer every 6 hours  atorvastatin 20 milliGRAM(s) Oral at bedtime  azithromycin  IVPB 500 milliGRAM(s) IV Intermittent every 24 hours  benzonatate 100 milliGRAM(s) Oral three times a day  cefTRIAXone   IVPB 1000 milliGRAM(s) IV Intermittent every 24 hours  dextrose 5%. 1000 milliLiter(s) (100 mL/Hr) IV Continuous <Continuous>  dextrose 50% Injectable 25 Gram(s) IV Push once  famotidine    Tablet 20 milliGRAM(s) Oral daily  glucagon  Injectable 1 milliGRAM(s) IntraMuscular once  heparin   Injectable 5000 Unit(s) SubCutaneous every 12 hours  influenza  Vaccine (HIGH DOSE) 0.7 milliLiter(s) IntraMuscular once  insulin glargine Injectable (LANTUS) 12 Unit(s) SubCutaneous at bedtime  insulin lispro (ADMELOG) corrective regimen sliding scale   SubCutaneous every 6 hours  levothyroxine 75 MICROGram(s) Oral daily  mupirocin 2% Nasal 1 Application(s) Both Nostrils two times a day  sevelamer carbonate 800 milliGRAM(s) Oral <User Schedule>    MEDICATIONS  (PRN):  guaiFENesin Oral Liquid (Sugar-Free) 100 milliGRAM(s) Oral every 6 hours PRN Cough      CAPILLARY BLOOD GLUCOSE      POCT Blood Glucose.: 114 mg/dL (20 Dec 2023 06:14)  POCT Blood Glucose.: 141 mg/dL (20 Dec 2023 00:01)  POCT Blood Glucose.: 176 mg/dL (19 Dec 2023 22:12)  POCT Blood Glucose.: 110 mg/dL (19 Dec 2023 17:02)  POCT Blood Glucose.: 106 mg/dL (19 Dec 2023 12:27)    I&O's Summary      PHYSICAL EXAM:  Vital Signs Last 24 Hrs  T(C): 36.7 (20 Dec 2023 06:25), Max: 36.9 (19 Dec 2023 11:23)  T(F): 98 (20 Dec 2023 06:25), Max: 98.4 (19 Dec 2023 11:23)  HR: 82 (20 Dec 2023 06:25) (76 - 85)  BP: 125/73 (20 Dec 2023 06:25) (119/66 - 153/58)  BP(mean): --  RR: 18 (20 Dec 2023 06:25) (18 - 18)  SpO2: 99% (20 Dec 2023 06:25) (96% - 100%)    Parameters below as of 20 Dec 2023 06:25  Patient On (Oxygen Delivery Method): nasal cannula  O2 Flow (L/min): 2      CONSTITUTIONAL: NAD, well-developed  RESPIRATORY: Normal respiratory effort; lungs are clear to auscultation bilaterally  CARDIOVASCULAR: Regular rate and rhythm, normal S1 and S2, no murmur/rub/gallop; No lower extremity edema; Peripheral pulses are 2+ bilaterally  ABDOMEN: Nontender to palpation, normoactive bowel sounds, no rebound/guarding; No hepatosplenomegaly  MSK: no clubbing or cyanosis of digits; no joint swelling or tenderness to palpation  PSYCH: A+O to person, place, and time; affect appropriate    LABS:                        10.7   8.04  )-----------( 222      ( 20 Dec 2023 06:59 )             33.3     12-19    133<L>  |  91<L>  |  25<H>  ----------------------------<  84  4.2   |  31  |  3.91<H>    Ca    9.3      19 Dec 2023 06:58  Phos  3.8     12-19  Mg     2.1     12-19    TPro  6.9  /  Alb  3.6  /  TBili  0.2  /  DBili  x   /  AST  26  /  ALT  21  /  AlkPhos  81  12-19          Urinalysis Basic - ( 19 Dec 2023 06:58 )    Color: x / Appearance: x / SG: x / pH: x  Gluc: 84 mg/dL / Ketone: x  / Bili: x / Urobili: x   Blood: x / Protein: x / Nitrite: x   Leuk Esterase: x / RBC: x / WBC x   Sq Epi: x / Non Sq Epi: x / Bacteria: x        Culture - Blood (collected 17 Dec 2023 14:23)  Source: .Blood Blood-Peripheral  Preliminary Report (19 Dec 2023 20:02):    No growth at 48 Hours    Culture - Blood (collected 17 Dec 2023 14:20)  Source: .Blood Blood-Peripheral  Preliminary Report (19 Dec 2023 20:02):    No growth at 48 Hours        Tele Reviewed:    RADIOLOGY & ADDITIONAL TESTS:  Results Reviewed:   Imaging Personally Reviewed:  Electrocardiogram Personally Reviewed:     PROGRESS NOTE:   Authored by Dr. Kimberlee Moscoso MD (PGY-1). Pager [facility] [phone] / [place] ( [id number]) or via TEAMS    Patient is a 83y old  Female who presents with a chief complaint of AHRF (19 Dec 2023 07:08)      SUBJECTIVE / OVERNIGHT EVENTS:  No acute events overnight.     ADDITIONAL REVIEW OF SYSTEMS:  Patient denies fevers, chills, chest pain, shortness of breath, nausea, abdominal pain, diarrhea, constipation, dysuria, leg swelling, headache, light headedness.    MEDICATIONS  (STANDING):  albuterol/ipratropium for Nebulization 3 milliLiter(s) Nebulizer every 6 hours  atorvastatin 20 milliGRAM(s) Oral at bedtime  azithromycin  IVPB 500 milliGRAM(s) IV Intermittent every 24 hours  benzonatate 100 milliGRAM(s) Oral three times a day  cefTRIAXone   IVPB 1000 milliGRAM(s) IV Intermittent every 24 hours  dextrose 5%. 1000 milliLiter(s) (100 mL/Hr) IV Continuous <Continuous>  dextrose 50% Injectable 25 Gram(s) IV Push once  famotidine    Tablet 20 milliGRAM(s) Oral daily  glucagon  Injectable 1 milliGRAM(s) IntraMuscular once  heparin   Injectable 5000 Unit(s) SubCutaneous every 12 hours  influenza  Vaccine (HIGH DOSE) 0.7 milliLiter(s) IntraMuscular once  insulin glargine Injectable (LANTUS) 12 Unit(s) SubCutaneous at bedtime  insulin lispro (ADMELOG) corrective regimen sliding scale   SubCutaneous every 6 hours  levothyroxine 75 MICROGram(s) Oral daily  mupirocin 2% Nasal 1 Application(s) Both Nostrils two times a day  sevelamer carbonate 800 milliGRAM(s) Oral <User Schedule>    MEDICATIONS  (PRN):  guaiFENesin Oral Liquid (Sugar-Free) 100 milliGRAM(s) Oral every 6 hours PRN Cough      CAPILLARY BLOOD GLUCOSE      POCT Blood Glucose.: 114 mg/dL (20 Dec 2023 06:14)  POCT Blood Glucose.: 141 mg/dL (20 Dec 2023 00:01)  POCT Blood Glucose.: 176 mg/dL (19 Dec 2023 22:12)  POCT Blood Glucose.: 110 mg/dL (19 Dec 2023 17:02)  POCT Blood Glucose.: 106 mg/dL (19 Dec 2023 12:27)    I&O's Summary      PHYSICAL EXAM:  Vital Signs Last 24 Hrs  T(C): 36.7 (20 Dec 2023 06:25), Max: 36.9 (19 Dec 2023 11:23)  T(F): 98 (20 Dec 2023 06:25), Max: 98.4 (19 Dec 2023 11:23)  HR: 82 (20 Dec 2023 06:25) (76 - 85)  BP: 125/73 (20 Dec 2023 06:25) (119/66 - 153/58)  BP(mean): --  RR: 18 (20 Dec 2023 06:25) (18 - 18)  SpO2: 99% (20 Dec 2023 06:25) (96% - 100%)    Parameters below as of 20 Dec 2023 06:25  Patient On (Oxygen Delivery Method): nasal cannula  O2 Flow (L/min): 2      CONSTITUTIONAL: NAD, well-developed  RESPIRATORY: Normal respiratory effort; lungs are clear to auscultation bilaterally  CARDIOVASCULAR: Regular rate and rhythm, normal S1 and S2, no murmur/rub/gallop; No lower extremity edema; Peripheral pulses are 2+ bilaterally  ABDOMEN: Nontender to palpation, normoactive bowel sounds, no rebound/guarding; No hepatosplenomegaly  MSK: no clubbing or cyanosis of digits; no joint swelling or tenderness to palpation  PSYCH: A+O to person, place, and time; affect appropriate    LABS:                        10.7   8.04  )-----------( 222      ( 20 Dec 2023 06:59 )             33.3     12-19    133<L>  |  91<L>  |  25<H>  ----------------------------<  84  4.2   |  31  |  3.91<H>    Ca    9.3      19 Dec 2023 06:58  Phos  3.8     12-19  Mg     2.1     12-19    TPro  6.9  /  Alb  3.6  /  TBili  0.2  /  DBili  x   /  AST  26  /  ALT  21  /  AlkPhos  81  12-19          Urinalysis Basic - ( 19 Dec 2023 06:58 )    Color: x / Appearance: x / SG: x / pH: x  Gluc: 84 mg/dL / Ketone: x  / Bili: x / Urobili: x   Blood: x / Protein: x / Nitrite: x   Leuk Esterase: x / RBC: x / WBC x   Sq Epi: x / Non Sq Epi: x / Bacteria: x        Culture - Blood (collected 17 Dec 2023 14:23)  Source: .Blood Blood-Peripheral  Preliminary Report (19 Dec 2023 20:02):    No growth at 48 Hours    Culture - Blood (collected 17 Dec 2023 14:20)  Source: .Blood Blood-Peripheral  Preliminary Report (19 Dec 2023 20:02):    No growth at 48 Hours        Tele Reviewed:    RADIOLOGY & ADDITIONAL TESTS:  Results Reviewed:   Imaging Personally Reviewed:  Electrocardiogram Personally Reviewed:     PROGRESS NOTE:   Authored by Dr. Kimberlee Moscoso MD (PGY-1). Pager [facility] [phone] / [place] ( [id number]) or via TEAMS    Patient is a 83y old  Female who presents with a chief complaint of AHRF (19 Dec 2023 07:08)      SUBJECTIVE / OVERNIGHT EVENTS:  No acute events overnight. Pt did not use CPAP at night, sisters at bedside states felt she didn't need it/ want it. Otherwise denies CP, improvement in SOB, denies abd pain. Able to tolerate PO. Going for dialysis today.       MEDICATIONS  (STANDING):  albuterol/ipratropium for Nebulization 3 milliLiter(s) Nebulizer every 6 hours  atorvastatin 20 milliGRAM(s) Oral at bedtime  azithromycin  IVPB 500 milliGRAM(s) IV Intermittent every 24 hours  benzonatate 100 milliGRAM(s) Oral three times a day  cefTRIAXone   IVPB 1000 milliGRAM(s) IV Intermittent every 24 hours  dextrose 5%. 1000 milliLiter(s) (100 mL/Hr) IV Continuous <Continuous>  dextrose 50% Injectable 25 Gram(s) IV Push once  famotidine    Tablet 20 milliGRAM(s) Oral daily  glucagon  Injectable 1 milliGRAM(s) IntraMuscular once  heparin   Injectable 5000 Unit(s) SubCutaneous every 12 hours  influenza  Vaccine (HIGH DOSE) 0.7 milliLiter(s) IntraMuscular once  insulin glargine Injectable (LANTUS) 12 Unit(s) SubCutaneous at bedtime  insulin lispro (ADMELOG) corrective regimen sliding scale   SubCutaneous every 6 hours  levothyroxine 75 MICROGram(s) Oral daily  mupirocin 2% Nasal 1 Application(s) Both Nostrils two times a day  sevelamer carbonate 800 milliGRAM(s) Oral <User Schedule>    MEDICATIONS  (PRN):  guaiFENesin Oral Liquid (Sugar-Free) 100 milliGRAM(s) Oral every 6 hours PRN Cough      CAPILLARY BLOOD GLUCOSE      POCT Blood Glucose.: 114 mg/dL (20 Dec 2023 06:14)  POCT Blood Glucose.: 141 mg/dL (20 Dec 2023 00:01)  POCT Blood Glucose.: 176 mg/dL (19 Dec 2023 22:12)  POCT Blood Glucose.: 110 mg/dL (19 Dec 2023 17:02)  POCT Blood Glucose.: 106 mg/dL (19 Dec 2023 12:27)    I&O's Summary      PHYSICAL EXAM:  Vital Signs Last 24 Hrs  T(C): 36.7 (20 Dec 2023 06:25), Max: 36.9 (19 Dec 2023 11:23)  T(F): 98 (20 Dec 2023 06:25), Max: 98.4 (19 Dec 2023 11:23)  HR: 82 (20 Dec 2023 06:25) (76 - 85)  BP: 125/73 (20 Dec 2023 06:25) (119/66 - 153/58)  BP(mean): --  RR: 18 (20 Dec 2023 06:25) (18 - 18)  SpO2: 99% (20 Dec 2023 06:25) (96% - 100%)    Parameters below as of 20 Dec 2023 06:25  Patient On (Oxygen Delivery Method): nasal cannula  O2 Flow (L/min): 2      CONSTITUTIONAL: NAD, obese female on NC   RESPIRATORY: Normal respiratory effort; lungs are clear to auscultation bilaterally however with scattered intermittent wheezing   CARDIOVASCULAR: Regular rate and rhythm, normal S1 and S2, no murmur/rub/gallop; No lower extremity edema; Peripheral pulses are 2+ bilaterally  ABDOMEN: Nontender to palpation, normoactive bowel sounds, no rebound/guarding; No hepatosplenomegaly  MSK: no clubbing or cyanosis of digits; no joint swelling or tenderness to palpation, 1+ pitting edema b/l   PSYCH: A+O to person, place, and time; affect appropriate    LABS:                        10.7   8.04  )-----------( 222      ( 20 Dec 2023 06:59 )             33.3     12-19    133<L>  |  91<L>  |  25<H>  ----------------------------<  84  4.2   |  31  |  3.91<H>    Ca    9.3      19 Dec 2023 06:58  Phos  3.8     12-19  Mg     2.1     12-19    TPro  6.9  /  Alb  3.6  /  TBili  0.2  /  DBili  x   /  AST  26  /  ALT  21  /  AlkPhos  81  12-19          Urinalysis Basic - ( 19 Dec 2023 06:58 )    Color: x / Appearance: x / SG: x / pH: x  Gluc: 84 mg/dL / Ketone: x  / Bili: x / Urobili: x   Blood: x / Protein: x / Nitrite: x   Leuk Esterase: x / RBC: x / WBC x   Sq Epi: x / Non Sq Epi: x / Bacteria: x        Culture - Blood (collected 17 Dec 2023 14:23)  Source: .Blood Blood-Peripheral  Preliminary Report (19 Dec 2023 20:02):    No growth at 48 Hours    Culture - Blood (collected 17 Dec 2023 14:20)  Source: .Blood Blood-Peripheral  Preliminary Report (19 Dec 2023 20:02):    No growth at 48 Hours        Tele Reviewed:    RADIOLOGY & ADDITIONAL TESTS:  Results Reviewed:   Imaging Personally Reviewed:  Electrocardiogram Personally Reviewed:     PROGRESS NOTE:   Authored by Dr. Kimberlee Moscoso MD (PGY-1). Pager [facility] [phone] / [place] ( [id number]) or via TEAMS    Patient is a 83y old  Female who presents with a chief complaint of AHRF (19 Dec 2023 07:08)      SUBJECTIVE / OVERNIGHT EVENTS:  No acute events overnight. Pt did not use CPAP at night, sisters at bedside states felt she didn't need it/ want it. Otherwise denies CP, improvement in SOB, denies abd pain. Able to tolerate PO. Going for dialysis today.       MEDICATIONS  (STANDING):  albuterol/ipratropium for Nebulization 3 milliLiter(s) Nebulizer every 6 hours  atorvastatin 20 milliGRAM(s) Oral at bedtime  azithromycin  IVPB 500 milliGRAM(s) IV Intermittent every 24 hours  benzonatate 100 milliGRAM(s) Oral three times a day  cefTRIAXone   IVPB 1000 milliGRAM(s) IV Intermittent every 24 hours  dextrose 5%. 1000 milliLiter(s) (100 mL/Hr) IV Continuous <Continuous>  dextrose 50% Injectable 25 Gram(s) IV Push once  famotidine    Tablet 20 milliGRAM(s) Oral daily  glucagon  Injectable 1 milliGRAM(s) IntraMuscular once  heparin   Injectable 5000 Unit(s) SubCutaneous every 12 hours  influenza  Vaccine (HIGH DOSE) 0.7 milliLiter(s) IntraMuscular once  insulin glargine Injectable (LANTUS) 12 Unit(s) SubCutaneous at bedtime  insulin lispro (ADMELOG) corrective regimen sliding scale   SubCutaneous every 6 hours  levothyroxine 75 MICROGram(s) Oral daily  mupirocin 2% Nasal 1 Application(s) Both Nostrils two times a day  sevelamer carbonate 800 milliGRAM(s) Oral <User Schedule>    MEDICATIONS  (PRN):  guaiFENesin Oral Liquid (Sugar-Free) 100 milliGRAM(s) Oral every 6 hours PRN Cough      CAPILLARY BLOOD GLUCOSE      POCT Blood Glucose.: 114 mg/dL (20 Dec 2023 06:14)  POCT Blood Glucose.: 141 mg/dL (20 Dec 2023 00:01)  POCT Blood Glucose.: 176 mg/dL (19 Dec 2023 22:12)  POCT Blood Glucose.: 110 mg/dL (19 Dec 2023 17:02)  POCT Blood Glucose.: 106 mg/dL (19 Dec 2023 12:27)    I&O's Summary      PHYSICAL EXAM:  Vital Signs Last 24 Hrs  T(C): 36.7 (20 Dec 2023 06:25), Max: 36.9 (19 Dec 2023 11:23)  T(F): 98 (20 Dec 2023 06:25), Max: 98.4 (19 Dec 2023 11:23)  HR: 82 (20 Dec 2023 06:25) (76 - 85)  BP: 125/73 (20 Dec 2023 06:25) (119/66 - 153/58)  BP(mean): --  RR: 18 (20 Dec 2023 06:25) (18 - 18)  SpO2: 99% (20 Dec 2023 06:25) (96% - 100%)    Parameters below as of 20 Dec 2023 06:25  Patient On (Oxygen Delivery Method): nasal cannula  O2 Flow (L/min): 2      CONSTITUTIONAL: NAD, obese female on NC   RESPIRATORY: Normal respiratory effort; lungs are clear to auscultation bilaterally however with scattered intermittent wheezing   CARDIOVASCULAR: Regular rate and rhythm, normal S1 and S2, no murmur/rub/gallop; No lower extremity edema; Peripheral pulses are 2+ bilaterally  ABDOMEN: Nontender to palpation, normoactive bowel sounds, no rebound/guarding; No hepatosplenomegaly  MSK: no clubbing or cyanosis of digits; no joint swelling or tenderness to palpation, 1+ pitting edema b/l   PSYCH: A+O to person, place, and time; affect appropriate    LABS:                        10.7   8.04  )-----------( 222      ( 20 Dec 2023 06:59 )             33.3     12-19    133<L>  |  91<L>  |  25<H>  ----------------------------<  84  4.2   |  31  |  3.91<H>    Ca    9.3      19 Dec 2023 06:58  Phos  3.8     12-19  Mg     2.1     12-19    TPro  6.9  /  Alb  3.6  /  TBili  0.2  /  DBili  x   /  AST  26  /  ALT  21  /  AlkPhos  81  12-19          Urinalysis Basic - ( 19 Dec 2023 06:58 )    Color: x / Appearance: x / SG: x / pH: x  Gluc: 84 mg/dL / Ketone: x  / Bili: x / Urobili: x   Blood: x / Protein: x / Nitrite: x   Leuk Esterase: x / RBC: x / WBC x   Sq Epi: x / Non Sq Epi: x / Bacteria: x        Culture - Blood (collected 17 Dec 2023 14:23)  Source: .Blood Blood-Peripheral  Preliminary Report (19 Dec 2023 20:02):    No growth at 48 Hours    Culture - Blood (collected 17 Dec 2023 14:20)  Source: .Blood Blood-Peripheral  Preliminary Report (19 Dec 2023 20:02):    No growth at 48 Hours        Tele Reviewed:    RADIOLOGY & ADDITIONAL TESTS:  Results Reviewed:   Imaging Personally Reviewed:  Electrocardiogram Personally Reviewed:

## 2023-12-20 NOTE — PROGRESS NOTE ADULT - ASSESSMENT
83F ESRD on HD Mon/Tues/Thurs/Fri, HLD, T2DM, hypothyroidism, presenting with productive cough and SOB for about 1 week, admitted for AHRF-  secondary to RSV PNEUMONITIS  with possible superimposed PNA vs. volume overload.---appears also to have obesity hypoventilation syndrome--------        RSV pneumonitis---continue nebulizer q 6hrly----  solumedrol 20 mg ivpb q 12 hrly---needs to follow up the accu checks ---keep saturation .90%    ----pt possibly has a superadded bacterial process--- contd antibiotics coverage for CAP --f/u the procalcitonin    ---Hypercapnia ---  secondary to  underlying obesity hypoventilation syndrome which worsened in the setting of pneumonitis-----f/u the VBg----will benefit from Nocturnal BIPAP     -DVT PROPHYLAXIS   -Joshua ATC  -Chest PT  --H/O CRF --ON HD   3 TIMES PER WEEK    --H/O HYPOTHYROIDISM--- -Please check the TSH    PULMONARY CONSULT TEAM WILL FOLLOW THE PT CLINICALLY WITH THE PRIMARY TEAM    DISCUSSED WITH Dr Gaona --- also briefly discussed with Dr. Moscoso

## 2023-12-21 LAB
ALBUMIN SERPL ELPH-MCNC: 3.3 G/DL — SIGNIFICANT CHANGE UP (ref 3.3–5)
ALBUMIN SERPL ELPH-MCNC: 3.3 G/DL — SIGNIFICANT CHANGE UP (ref 3.3–5)
ALP SERPL-CCNC: 80 U/L — SIGNIFICANT CHANGE UP (ref 40–120)
ALP SERPL-CCNC: 80 U/L — SIGNIFICANT CHANGE UP (ref 40–120)
ALT FLD-CCNC: 20 U/L — SIGNIFICANT CHANGE UP (ref 10–45)
ALT FLD-CCNC: 20 U/L — SIGNIFICANT CHANGE UP (ref 10–45)
ANION GAP SERPL CALC-SCNC: 10 MMOL/L — SIGNIFICANT CHANGE UP (ref 5–17)
ANION GAP SERPL CALC-SCNC: 10 MMOL/L — SIGNIFICANT CHANGE UP (ref 5–17)
AST SERPL-CCNC: 14 U/L — SIGNIFICANT CHANGE UP (ref 10–40)
AST SERPL-CCNC: 14 U/L — SIGNIFICANT CHANGE UP (ref 10–40)
BASE EXCESS BLDV CALC-SCNC: 2.6 MMOL/L — SIGNIFICANT CHANGE UP (ref -2–3)
BASE EXCESS BLDV CALC-SCNC: 2.6 MMOL/L — SIGNIFICANT CHANGE UP (ref -2–3)
BILIRUB SERPL-MCNC: 0.2 MG/DL — SIGNIFICANT CHANGE UP (ref 0.2–1.2)
BILIRUB SERPL-MCNC: 0.2 MG/DL — SIGNIFICANT CHANGE UP (ref 0.2–1.2)
BUN SERPL-MCNC: 23 MG/DL — SIGNIFICANT CHANGE UP (ref 7–23)
BUN SERPL-MCNC: 23 MG/DL — SIGNIFICANT CHANGE UP (ref 7–23)
CA-I SERPL-SCNC: 1.25 MMOL/L — SIGNIFICANT CHANGE UP (ref 1.15–1.33)
CA-I SERPL-SCNC: 1.25 MMOL/L — SIGNIFICANT CHANGE UP (ref 1.15–1.33)
CALCIUM SERPL-MCNC: 9.3 MG/DL — SIGNIFICANT CHANGE UP (ref 8.4–10.5)
CALCIUM SERPL-MCNC: 9.3 MG/DL — SIGNIFICANT CHANGE UP (ref 8.4–10.5)
CHLORIDE BLDV-SCNC: 95 MMOL/L — LOW (ref 96–108)
CHLORIDE BLDV-SCNC: 95 MMOL/L — LOW (ref 96–108)
CHLORIDE SERPL-SCNC: 96 MMOL/L — SIGNIFICANT CHANGE UP (ref 96–108)
CHLORIDE SERPL-SCNC: 96 MMOL/L — SIGNIFICANT CHANGE UP (ref 96–108)
CO2 BLDV-SCNC: 34 MMOL/L — HIGH (ref 22–26)
CO2 BLDV-SCNC: 34 MMOL/L — HIGH (ref 22–26)
CO2 SERPL-SCNC: 31 MMOL/L — SIGNIFICANT CHANGE UP (ref 22–31)
CO2 SERPL-SCNC: 31 MMOL/L — SIGNIFICANT CHANGE UP (ref 22–31)
CREAT SERPL-MCNC: 3.59 MG/DL — HIGH (ref 0.5–1.3)
CREAT SERPL-MCNC: 3.59 MG/DL — HIGH (ref 0.5–1.3)
EGFR: 12 ML/MIN/1.73M2 — LOW
EGFR: 12 ML/MIN/1.73M2 — LOW
GAS PNL BLDV: 133 MMOL/L — LOW (ref 136–145)
GAS PNL BLDV: 133 MMOL/L — LOW (ref 136–145)
GAS PNL BLDV: SIGNIFICANT CHANGE UP
GLUCOSE BLDC GLUCOMTR-MCNC: 166 MG/DL — HIGH (ref 70–99)
GLUCOSE BLDC GLUCOMTR-MCNC: 166 MG/DL — HIGH (ref 70–99)
GLUCOSE BLDC GLUCOMTR-MCNC: 209 MG/DL — HIGH (ref 70–99)
GLUCOSE BLDC GLUCOMTR-MCNC: 209 MG/DL — HIGH (ref 70–99)
GLUCOSE BLDC GLUCOMTR-MCNC: 257 MG/DL — HIGH (ref 70–99)
GLUCOSE BLDC GLUCOMTR-MCNC: 257 MG/DL — HIGH (ref 70–99)
GLUCOSE BLDV-MCNC: 237 MG/DL — HIGH (ref 70–99)
GLUCOSE BLDV-MCNC: 237 MG/DL — HIGH (ref 70–99)
GLUCOSE SERPL-MCNC: 236 MG/DL — HIGH (ref 70–99)
GLUCOSE SERPL-MCNC: 236 MG/DL — HIGH (ref 70–99)
HCO3 BLDV-SCNC: 31 MMOL/L — HIGH (ref 22–29)
HCO3 BLDV-SCNC: 31 MMOL/L — HIGH (ref 22–29)
HCT VFR BLD CALC: 30.1 % — LOW (ref 34.5–45)
HCT VFR BLD CALC: 30.1 % — LOW (ref 34.5–45)
HCT VFR BLDA CALC: 36 % — SIGNIFICANT CHANGE UP (ref 34.5–46.5)
HCT VFR BLDA CALC: 36 % — SIGNIFICANT CHANGE UP (ref 34.5–46.5)
HGB BLD CALC-MCNC: 12 G/DL — SIGNIFICANT CHANGE UP (ref 11.7–16.1)
HGB BLD CALC-MCNC: 12 G/DL — SIGNIFICANT CHANGE UP (ref 11.7–16.1)
HGB BLD-MCNC: 9.8 G/DL — LOW (ref 11.5–15.5)
HGB BLD-MCNC: 9.8 G/DL — LOW (ref 11.5–15.5)
LACTATE BLDV-MCNC: 2.5 MMOL/L — HIGH (ref 0.5–2)
LACTATE BLDV-MCNC: 2.5 MMOL/L — HIGH (ref 0.5–2)
LACTATE SERPL-SCNC: 2.5 MMOL/L — HIGH (ref 0.5–2)
LACTATE SERPL-SCNC: 2.5 MMOL/L — HIGH (ref 0.5–2)
MAGNESIUM SERPL-MCNC: 2.1 MG/DL — SIGNIFICANT CHANGE UP (ref 1.6–2.6)
MAGNESIUM SERPL-MCNC: 2.1 MG/DL — SIGNIFICANT CHANGE UP (ref 1.6–2.6)
MCHC RBC-ENTMCNC: 32.6 GM/DL — SIGNIFICANT CHANGE UP (ref 32–36)
MCHC RBC-ENTMCNC: 32.6 GM/DL — SIGNIFICANT CHANGE UP (ref 32–36)
MCHC RBC-ENTMCNC: 33.8 PG — SIGNIFICANT CHANGE UP (ref 27–34)
MCHC RBC-ENTMCNC: 33.8 PG — SIGNIFICANT CHANGE UP (ref 27–34)
MCV RBC AUTO: 103.8 FL — HIGH (ref 80–100)
MCV RBC AUTO: 103.8 FL — HIGH (ref 80–100)
NRBC # BLD: 0 /100 WBCS — SIGNIFICANT CHANGE UP (ref 0–0)
NRBC # BLD: 0 /100 WBCS — SIGNIFICANT CHANGE UP (ref 0–0)
PCO2 BLDV: 70 MMHG — HIGH (ref 39–42)
PCO2 BLDV: 70 MMHG — HIGH (ref 39–42)
PH BLDV: 7.26 — LOW (ref 7.32–7.43)
PH BLDV: 7.26 — LOW (ref 7.32–7.43)
PHOSPHATE SERPL-MCNC: 2.9 MG/DL — SIGNIFICANT CHANGE UP (ref 2.5–4.5)
PHOSPHATE SERPL-MCNC: 2.9 MG/DL — SIGNIFICANT CHANGE UP (ref 2.5–4.5)
PLATELET # BLD AUTO: 195 K/UL — SIGNIFICANT CHANGE UP (ref 150–400)
PLATELET # BLD AUTO: 195 K/UL — SIGNIFICANT CHANGE UP (ref 150–400)
PO2 BLDV: 44 MMHG — SIGNIFICANT CHANGE UP (ref 25–45)
PO2 BLDV: 44 MMHG — SIGNIFICANT CHANGE UP (ref 25–45)
POTASSIUM BLDV-SCNC: 4 MMOL/L — SIGNIFICANT CHANGE UP (ref 3.5–5.1)
POTASSIUM BLDV-SCNC: 4 MMOL/L — SIGNIFICANT CHANGE UP (ref 3.5–5.1)
POTASSIUM SERPL-MCNC: 4 MMOL/L — SIGNIFICANT CHANGE UP (ref 3.5–5.3)
POTASSIUM SERPL-MCNC: 4 MMOL/L — SIGNIFICANT CHANGE UP (ref 3.5–5.3)
POTASSIUM SERPL-SCNC: 4 MMOL/L — SIGNIFICANT CHANGE UP (ref 3.5–5.3)
POTASSIUM SERPL-SCNC: 4 MMOL/L — SIGNIFICANT CHANGE UP (ref 3.5–5.3)
PROCALCITONIN SERPL-MCNC: 0.4 NG/ML — HIGH (ref 0.02–0.1)
PROCALCITONIN SERPL-MCNC: 0.4 NG/ML — HIGH (ref 0.02–0.1)
PROT SERPL-MCNC: 6.3 G/DL — SIGNIFICANT CHANGE UP (ref 6–8.3)
PROT SERPL-MCNC: 6.3 G/DL — SIGNIFICANT CHANGE UP (ref 6–8.3)
RBC # BLD: 2.9 M/UL — LOW (ref 3.8–5.2)
RBC # BLD: 2.9 M/UL — LOW (ref 3.8–5.2)
RBC # FLD: 13.2 % — SIGNIFICANT CHANGE UP (ref 10.3–14.5)
RBC # FLD: 13.2 % — SIGNIFICANT CHANGE UP (ref 10.3–14.5)
SAO2 % BLDV: 79.2 % — SIGNIFICANT CHANGE UP (ref 67–88)
SAO2 % BLDV: 79.2 % — SIGNIFICANT CHANGE UP (ref 67–88)
SODIUM SERPL-SCNC: 137 MMOL/L — SIGNIFICANT CHANGE UP (ref 135–145)
SODIUM SERPL-SCNC: 137 MMOL/L — SIGNIFICANT CHANGE UP (ref 135–145)
TROPONIN T, HIGH SENSITIVITY RESULT: 285 NG/L — HIGH (ref 0–51)
TROPONIN T, HIGH SENSITIVITY RESULT: 285 NG/L — HIGH (ref 0–51)
TSH SERPL-MCNC: 1.01 UIU/ML — SIGNIFICANT CHANGE UP (ref 0.27–4.2)
TSH SERPL-MCNC: 1.01 UIU/ML — SIGNIFICANT CHANGE UP (ref 0.27–4.2)
WBC # BLD: 8.8 K/UL — SIGNIFICANT CHANGE UP (ref 3.8–10.5)
WBC # BLD: 8.8 K/UL — SIGNIFICANT CHANGE UP (ref 3.8–10.5)
WBC # FLD AUTO: 8.8 K/UL — SIGNIFICANT CHANGE UP (ref 3.8–10.5)
WBC # FLD AUTO: 8.8 K/UL — SIGNIFICANT CHANGE UP (ref 3.8–10.5)

## 2023-12-21 PROCEDURE — 99233 SBSQ HOSP IP/OBS HIGH 50: CPT | Mod: GC

## 2023-12-21 RX ORDER — INSULIN LISPRO 100/ML
VIAL (ML) SUBCUTANEOUS AT BEDTIME
Refills: 0 | Status: DISCONTINUED | OUTPATIENT
Start: 2023-12-21 | End: 2023-12-22

## 2023-12-21 RX ORDER — INSULIN LISPRO 100/ML
VIAL (ML) SUBCUTANEOUS
Refills: 0 | Status: DISCONTINUED | OUTPATIENT
Start: 2023-12-21 | End: 2023-12-22

## 2023-12-21 RX ORDER — INSULIN GLARGINE 100 [IU]/ML
16 INJECTION, SOLUTION SUBCUTANEOUS AT BEDTIME
Refills: 0 | Status: DISCONTINUED | OUTPATIENT
Start: 2023-12-21 | End: 2023-12-22

## 2023-12-21 RX ORDER — SODIUM CHLORIDE 9 MG/ML
4 INJECTION INTRAMUSCULAR; INTRAVENOUS; SUBCUTANEOUS EVERY 6 HOURS
Refills: 0 | Status: DISCONTINUED | OUTPATIENT
Start: 2023-12-21 | End: 2023-12-22

## 2023-12-21 RX ADMIN — Medication 3 MILLILITER(S): at 13:39

## 2023-12-21 RX ADMIN — Medication 75 MICROGRAM(S): at 07:06

## 2023-12-21 RX ADMIN — Medication 3 MILLILITER(S): at 23:54

## 2023-12-21 RX ADMIN — SODIUM CHLORIDE 4 MILLILITER(S): 9 INJECTION INTRAMUSCULAR; INTRAVENOUS; SUBCUTANEOUS at 17:32

## 2023-12-21 RX ADMIN — CHLORHEXIDINE GLUCONATE 1 APPLICATION(S): 213 SOLUTION TOPICAL at 07:08

## 2023-12-21 RX ADMIN — ATORVASTATIN CALCIUM 20 MILLIGRAM(S): 80 TABLET, FILM COATED ORAL at 22:36

## 2023-12-21 RX ADMIN — Medication 100 MILLIGRAM(S): at 22:36

## 2023-12-21 RX ADMIN — Medication 100 MILLIGRAM(S): at 15:16

## 2023-12-21 RX ADMIN — SODIUM CHLORIDE 4 MILLILITER(S): 9 INJECTION INTRAMUSCULAR; INTRAVENOUS; SUBCUTANEOUS at 23:54

## 2023-12-21 RX ADMIN — Medication 100 MILLIGRAM(S): at 23:54

## 2023-12-21 RX ADMIN — Medication 1: at 08:46

## 2023-12-21 RX ADMIN — Medication 3 MILLILITER(S): at 17:32

## 2023-12-21 RX ADMIN — FAMOTIDINE 20 MILLIGRAM(S): 10 INJECTION INTRAVENOUS at 13:39

## 2023-12-21 RX ADMIN — Medication 20 MILLIGRAM(S): at 17:33

## 2023-12-21 RX ADMIN — Medication 100 MILLIGRAM(S): at 13:39

## 2023-12-21 RX ADMIN — Medication 100 MILLIGRAM(S): at 17:35

## 2023-12-21 RX ADMIN — CEFTRIAXONE 100 MILLIGRAM(S): 500 INJECTION, POWDER, FOR SOLUTION INTRAMUSCULAR; INTRAVENOUS at 22:37

## 2023-12-21 RX ADMIN — HEPARIN SODIUM 5000 UNIT(S): 5000 INJECTION INTRAVENOUS; SUBCUTANEOUS at 17:34

## 2023-12-21 RX ADMIN — MUPIROCIN 1 APPLICATION(S): 20 OINTMENT TOPICAL at 07:07

## 2023-12-21 RX ADMIN — INSULIN GLARGINE 16 UNIT(S): 100 INJECTION, SOLUTION SUBCUTANEOUS at 23:29

## 2023-12-21 RX ADMIN — HEPARIN SODIUM 5000 UNIT(S): 5000 INJECTION INTRAVENOUS; SUBCUTANEOUS at 07:07

## 2023-12-21 RX ADMIN — Medication 100 MILLIGRAM(S): at 07:07

## 2023-12-21 RX ADMIN — Medication 3 MILLILITER(S): at 07:07

## 2023-12-21 RX ADMIN — MUPIROCIN 1 APPLICATION(S): 20 OINTMENT TOPICAL at 17:45

## 2023-12-21 RX ADMIN — SEVELAMER CARBONATE 800 MILLIGRAM(S): 2400 POWDER, FOR SUSPENSION ORAL at 09:44

## 2023-12-21 RX ADMIN — Medication 6: at 17:32

## 2023-12-21 RX ADMIN — Medication 20 MILLIGRAM(S): at 07:07

## 2023-12-21 NOTE — CONSULT NOTE ADULT - ASSESSMENT
83F never smoker with history of ESRD on HD Mon/Tues/Thurs/Fri, HLD, T2DM, hypothyroidism, presenting with productive cough and SOB for about 1 week, admitted for AHRF requiring BiPAP, likely secondary to RSV with possible superimposed PNA vs. volume overload. Pulm consulted for further recs    #recommendations  patient with multifactorial etiology of respiratory failure both hypoxemic and hypercapnic in nature  please obtain if not already done  full RVP, sputum culture, blood cultures, U/A, urine legionella, nasal MRSA  needs aggressive airway clearance: humidified oxygen, standing guaifenesin, duonebs q6h, hyper sal q6h, chest PT q6 h, aerobika/acapella q6 h, chest vest q12h  continue morning VBGs with nocturnal bipap for now,   David Irwin PGY5 PCCM Fellow     83F never smoker with history of ESRD on HD Mon/Tues/Th/Fri, HLD, T2DM, hypothyroidism, presenting with productive cough and SOB for about 1 week, admitted for AHRF requiring BiPAP, likely secondary to RSV with possible superimposed PNA vs. volume overload. Pulm consulted for further recs    #recommendations  patient with multifactorial etiology of respiratory failure both hypoxemic and hypercapnic in nature  please obtain if not already done  full RVP, sputum culture, blood cultures, U/A, urine legionella, nasal MRSA  needs aggressive airway clearance: humidified oxygen, standing guaifenesin, duonebs q6h, hyper sal q6h, chest PT q6 h, aerobika/acapella q6 h, chest vest q12h  please obtain morning ABG without using NIV overnight to assess need for NIV prior to going home  93% RA ambulation    Prior to discharge:  Please email: [email or web address] to setup an appointment prior to discharge. Include the patient's name, , MRN and contact information in the email.    please specify Dr. Do    Pulmonary/Sleep Clinic  [address] [place], [place] [id number] [phone]      David Irwin PGY5 PCCM Fellow     83F never smoker with history of ESRD on HD Mon/Tues/Th/Fri, HLD, T2DM, hypothyroidism, presenting with productive cough and SOB for about 1 week, admitted for AHRF requiring BiPAP, likely secondary to RSV with possible superimposed PNA vs. volume overload. Pulm consulted for further recs    #recommendations  patient with multifactorial etiology of respiratory failure both hypoxemic and hypercapnic in nature  please obtain if not already done  full RVP, sputum culture, blood cultures, U/A, urine legionella, nasal MRSA  needs aggressive airway clearance: humidified oxygen, standing guaifenesin, duonebs q6h, hyper sal q6h, chest PT q6 h, aerobika/acapella q6 h, chest vest q12h  please obtain morning ABG without using NIV overnight to assess need for NIV prior to going home  93% RA ambulation    Prior to discharge:  Please email: [email or web address] to setup an appointment prior to discharge. Include the patient's name, , MRN and contact information in the email.    please specify Dr. Do    Pulmonary/Sleep Clinic  [address] [place], [place] [id number] [phone]      David Irwin PGY5 PCCM Fellow

## 2023-12-21 NOTE — CONSULT NOTE ADULT - ATTENDING COMMENTS
83F Hx HTN, T2DM, ESRD on home HD 4X/week (M,Tu,Th,Fr) p/w ED URI symptoms, SOB/WOB, Fever 100*F x 3 days found RSV+ve without Congestion, Volume Overload, Hyperkalemia started on BiPAP.   - A&O x 3 and FC x 4   - Stable Hemodynamic   - Minimal BiPAP setting RR12, 10/5,FiO2 40% SpO2 100%   - No urgent HD indication noted   - RVP +ve with Perihilar Infiltrates on BiPAP as needed
0
83F never smoker with history of ESRD on HD Mon/Tues/Thurs/Fri, HLD, T2DM, hypothyroidism, presenting with productive cough and SOB for about 1 week, admitted for AHRF requiring BiPAP, likely secondary to RSV with possible superimposed PNA vs. volume overload. Pulm consulted for further recs.  She was started on steroids yesterday by Dr. Do.  Hypercapnea noted on ABG, with venous CO2 70 today.  Currently awake, alert, still with end expiratory rhonchi, and rales scattered.  REmains on ceftriaxone/azithro/ medrol 20 bid and duonebs.    Agree with plan as outlined above.  Wll try to have her sleep off bipap tonight and check ABG in AM unless she develops hypoxemia, altered ms or increased WOB.  Patient is anxious to go home to continue dialysis at home.
ESRD on dialysis  Currently with hyperkalemia, hyponatremia, hyperphosphatemia  Here with pulmonary infection, RSV positive  On high flow NC, some edema  For dialysis today via AVG  Remainder per fellow, will follow

## 2023-12-21 NOTE — PROGRESS NOTE ADULT - PROBLEM SELECTOR PLAN 1
Stable. SIRS met w/ tachypnea, leukocytosis  iso RSV+, procal+ w/ CXR c/f bacterial PNA     - CAP tx CTX/azithro empiric course 5 days ceftriaxone 12/17- 21  - azithromycin 12/18- 12/20   - VS q4h, trend WBC/fever curve   - tx of AHRF as below

## 2023-12-21 NOTE — CONSULT NOTE ADULT - SUBJECTIVE AND OBJECTIVE BOX
CHIEF COMPLAINT:    HPI:  HPI:  83F R breast CA (s/p mastectomy), ESRD on HD Mon/Tues/Thurs/Fri, HLD, T2DM, hypothyroidism, presenting with productive cough and SOB for about 1 week. Patient accompanied by family who is assisting in history. Family reports pt had a cough that was initially dry and eventually became productive last few days. She has mild MCLEOD at baseline, but it has worsened during this time. She had fevers at home with Tmax 101 this AM. She was seen by her PCP earlier this month and had CXR which showed no consolidation. Was given doxycycline on Friday with minimal improvement.  Of note recently returned from [place], [place].     On arrival to ED, HDS, Tmax 100.2, SpO2 100% on 8L NRB. She subsequently had worsening WOB, so was transitioned to BiPAP. Initial workup remarkable for ABG 7.33/58/139/1.9 on BiPAP. +RSV. CXR with R perihilar opaciity. Given ceftriaxone, azithromycin, 40 mg solumedrol, and duoneb x 2. Admitted to medicine for further workup.  (17 Dec 2023 19:17)    Pulm consulted for possible superimposed bacterial infection on RSV pneumonitis, with possible obesity hypoventilation syndrome, and possible fluid overload   was initially borderline febrile on admission now afebrile, was on hiflo, BIPAP and now ambulates on RA wihtout desaturation 93% during the days remains hypercapnic on ABG 50-60-70, occasionally uncompensated.  presented with 7.33/58/139/31/100   most recent 7.33/61/164/32/100 lac 2.5  CT chest with tracheal secretions, nodules, clusters, calcified granulomas,   ECHO PASP not estimated, RA/LA dilated, LV low borderline normal , nml RV systolic function  on ceftriaxone, azithromycin, solu medrol 20 BID  on 10/5 may have been on CPAP  not followed by pulm as an outpatient had echo in 2018 with mild diastolic dysfunction        PAST MEDICAL & SURGICAL HISTORY:  Renal failure, chronic      Hypertension      ESRD on dialysis      Diabetes      History of hypothyroidism      HLD (hyperlipidemia)      Breast cancer      H/O right knee surgery      S/P mastectomy          FAMILY HISTORY:  No pertinent family history in first degree relatives        SOCIAL HISTORY:  Smoking: [ ] Never Smoked [ ] Former Smoker (__ packs x ___ years) [ ] Current Smoker  (__ packs x ___ years)  Substance Use: [ ] Never Used [ ] Used ____  EtOH Use:  Marital Status: [ ] Single [ ]  [ ]  [ ]   Sexual History:   Occupation:  Recent Travel:  Country of Birth:  Advance Directives:    Allergies    penicillin (Unknown)  vancomycin (Rash)    Intolerances        HOME MEDICATIONS:  Home Medications:  atorvastatin 20 mg oral tablet: 1 tab(s) orally once a day (17 Dec 2023 20:46)  famotidine 20 mg oral tablet: 1 tab(s) orally once a day (17 Dec 2023 20:46)  Lantus 100 units/mL subcutaneous solution: 16 unit(s) subcutaneous once a day with lunch (17 Dec 2023 20:46)  Renvela 800 mg oral tablet: 1 tab(s) orally once a day (17 Dec 2023 20:46)  Synthroid 75 mcg (0.075 mg) oral tablet: 1 tab(s) orally once a day (17 Dec 2023 20:46)      REVIEW OF SYSTEMS:  Constitutional: [ ] negative [ ] fevers [ ] chills [ ] weight loss [ ] weight gain  HEENT: [ ] negative [ ] dry eyes [ ] eye irritation [ ] postnasal drip [ ] nasal congestion  CV: [ ] negative  [ ] chest pain [ ] orthopnea [ ] palpitations [ ] murmur  Resp: [ ] negative [ ] cough [ ] shortness of breath [ ] dyspnea [ ] wheezing [ ] sputum [ ] hemoptysis  GI: [ ] negative [ ] nausea [ ] vomiting [ ] diarrhea [ ] constipation [ ] abd pain [ ] dysphagia   : [ ] negative [ ] dysuria [ ] nocturia [ ] hematuria [ ] increased urinary frequency  Musculoskeletal: [ ] negative [ ] back pain [ ] myalgias [ ] arthralgias [ ] fracture  Skin: [ ] negative [ ] rash [ ] itch  Neurological: [ ] negative [ ] headache [ ] dizziness [ ] syncope [ ] weakness [ ] numbness  Psychiatric: [ ] negative [ ] anxiety [ ] depression  Endocrine: [ ] negative [ ] diabetes [ ] thyroid problem  Hematologic/Lymphatic: [ ] negative [ ] anemia [ ] bleeding problem  Allergic/Immunologic: [ ] negative [ ] itchy eyes [ ] nasal discharge [ ] hives [ ] angioedema  [ ] All other systems negative  [ ] Unable to assess ROS because ________    OBJECTIVE:  ICU Vital Signs Last 24 Hrs  T(C): 36.9 (21 Dec 2023 05:00), Max: 37 (20 Dec 2023 21:39)  T(F): 98.5 (21 Dec 2023 05:00), Max: 98.6 (20 Dec 2023 21:39)  HR: 68 (21 Dec 2023 06:50) (63 - 96)  BP: 145/60 (21 Dec 2023 09:03) (129/63 - 171/68)  BP(mean): --  ABP: --  ABP(mean): --  RR: 18 (21 Dec 2023 05:00) (16 - 18)  SpO2: 96% (21 Dec 2023 09:03) (96% - 100%)    O2 Parameters below as of 21 Dec 2023 09:03  Patient On (Oxygen Delivery Method): room air              12-20 @ 07:01  -  12-21 @ 07:00  --------------------------------------------------------  IN: 600 mL / OUT: 1300 mL / NET: -700 mL      CAPILLARY BLOOD GLUCOSE      POCT Blood Glucose.: 166 mg/dL (21 Dec 2023 08:38)      PHYSICAL EXAM:  General: awake and alert, nontoxic appearing *** lying in bed  HEENT: NC/AT, EOMI b/l, conjunctiva normal, MMM  Lymph Nodes: no cervical LAD  Neck: supple. full range of motion  Respiratory: CTA b/l, no w/r/c, appears comfortable on ***, no conversational dyspnea or accessory muscle use  Cardiovascular: S1 S2 present, RRR, no m/r/g  Abdomen: soft, NT/ND, +BS  Extremities: no c/c/e  Skin: no rashes or lesions noted  Neurological: AAOx3, no focal deficits  Psychiatry: calm, cooperative    LINES:     HOSPITAL MEDICATIONS:  Standing Meds:  albuterol/ipratropium for Nebulization 3 milliLiter(s) Nebulizer every 6 hours  atorvastatin 20 milliGRAM(s) Oral at bedtime  azithromycin  IVPB 500 milliGRAM(s) IV Intermittent every 24 hours  benzonatate 100 milliGRAM(s) Oral three times a day  cefTRIAXone   IVPB 1000 milliGRAM(s) IV Intermittent every 24 hours  chlorhexidine 2% Cloths 1 Application(s) Topical <User Schedule>  dextrose 5%. 1000 milliLiter(s) IV Continuous <Continuous>  dextrose 50% Injectable 25 Gram(s) IV Push once  famotidine    Tablet 20 milliGRAM(s) Oral daily  glucagon  Injectable 1 milliGRAM(s) IntraMuscular once  heparin   Injectable 5000 Unit(s) SubCutaneous every 12 hours  influenza  Vaccine (HIGH DOSE) 0.7 milliLiter(s) IntraMuscular once  insulin glargine Injectable (LANTUS) 12 Unit(s) SubCutaneous at bedtime  insulin lispro (ADMELOG) corrective regimen sliding scale   SubCutaneous three times a day before meals  insulin lispro (ADMELOG) corrective regimen sliding scale   SubCutaneous at bedtime  levothyroxine 75 MICROGram(s) Oral daily  methylPREDNISolone sodium succinate Injectable 20 milliGRAM(s) IV Push every 12 hours  mupirocin 2% Nasal 1 Application(s) Both Nostrils two times a day  sevelamer carbonate 800 milliGRAM(s) Oral <User Schedule>      PRN Meds:  guaiFENesin Oral Liquid (Sugar-Free) 100 milliGRAM(s) Oral every 6 hours PRN      LABS:                        10.7   8.04  )-----------( 222      ( 20 Dec 2023 06:59 )             33.3     Hgb Trend: 10.7<--, 10.4<--, 9.7<--, 10.0<--  12-20    134<L>  |  92<L>  |  36<H>  ----------------------------<  147<H>  4.1   |  29  |  5.35<H>    Ca    9.6      20 Dec 2023 06:59  Phos  4.8     12-20  Mg     2.2     12-20    TPro  6.7  /  Alb  3.5  /  TBili  0.2  /  DBili  x   /  AST  22  /  ALT  22  /  AlkPhos  83  12-20    Creatinine Trend: 5.35<--, 3.91<--, 6.69<--, 5.69<--    Urinalysis Basic - ( 20 Dec 2023 06:59 )    Color: x / Appearance: x / SG: x / pH: x  Gluc: 147 mg/dL / Ketone: x  / Bili: x / Urobili: x   Blood: x / Protein: x / Nitrite: x   Leuk Esterase: x / RBC: x / WBC x   Sq Epi: x / Non Sq Epi: x / Bacteria: x      Arterial Blood Gas:  12-19 @ 14:17  7.29/71/48/34/83.5/6.4  ABG lactate: --    Venous Blood Gas:  12-20 @ 16:54  7.33/61/164/32/100.0  VBG Lactate: 2.5  Venous Blood Gas:  12-20 @ 07:08  7.26/75/33/34/55.1  VBG Lactate: 1.5      MICROBIOLOGY:       RADIOLOGY:  [ ] Reviewed and interpreted by me    PULMONARY FUNCTION TESTS:    EKG:   CHIEF COMPLAINT:    HPI:  HPI:  83F R breast CA (s/p mastectomy), ESRD on HD Mon/Tues/Thurs/Fri, HLD, T2DM, hypothyroidism, presenting with productive cough and SOB for about 1 week. Patient accompanied by family who is assisting in history. Family reports pt had a cough that was initially dry and eventually became productive last few days. She has mild MCLEOD at baseline, but it has worsened during this time. She had fevers at home with Tmax 101 this AM. She was seen by her PCP earlier this month and had CXR which showed no consolidation. Was given doxycycline on Friday with minimal improvement.  Of note recently returned from [place], [place].     On arrival to ED, HDS, Tmax 100.2, SpO2 100% on 8L NRB. She subsequently had worsening WOB, so was transitioned to BiPAP. Initial workup remarkable for ABG 7.33/58/139/1.9 on BiPAP. +RSV. CXR with R perihilar opaciity. Given ceftriaxone, azithromycin, 40 mg solumedrol, and duoneb x 2. Admitted to medicine for further workup.  (17 Dec 2023 19:17)    Pulm consulted for possible superimposed bacterial infection on RSV pneumonitis, with possible obesity hypoventilation syndrome, and possible fluid overload   was initially borderline febrile on admission now afebrile, was on hiflo, BIPAP and now ambulates on RA wihtout desaturation 93% during the days remains hypercapnic on ABG 50-60-70, occasionally uncompensated.  presented with 7.33/58/139/31/100   most recent 7.33/61/164/32/100 lac 2.5  CT chest with tracheal secretions, nodules, clusters, calcified granulomas,   ECHO PASP not estimated, RA/LA dilated, LV low borderline normal , nml RV systolic function  on ceftriaxone, azithromycin, solu medrol 20 BID  on 10/5 may have been on CPAP  not followed by pulm as an outpatient had echo in 2018 with mild diastolic dysfunction        PAST MEDICAL & SURGICAL HISTORY:  Renal failure, chronic      Hypertension      ESRD on dialysis      Diabetes      History of hypothyroidism      HLD (hyperlipidemia)      Breast cancer      H/O right knee surgery      S/P mastectomy          FAMILY HISTORY:  No pertinent family history in first degree relatives        SOCIAL HISTORY:  Smoking: [ ] Never Smoked [ ] Former Smoker (__ packs x ___ years) [ ] Current Smoker  (__ packs x ___ years)  Substance Use: [ ] Never Used [ ] Used ____  EtOH Use:  Marital Status: [ ] Single [ ]  [ ]  [ ]   Sexual History:   Occupation:  Recent Travel:  Country of Birth:  Advance Directives:    Allergies    penicillin (Unknown)  vancomycin (Rash)    Intolerances        HOME MEDICATIONS:  Home Medications:  atorvastatin 20 mg oral tablet: 1 tab(s) orally once a day (17 Dec 2023 20:46)  famotidine 20 mg oral tablet: 1 tab(s) orally once a day (17 Dec 2023 20:46)  Lantus 100 units/mL subcutaneous solution: 16 unit(s) subcutaneous once a day with lunch (17 Dec 2023 20:46)  Renvela 800 mg oral tablet: 1 tab(s) orally once a day (17 Dec 2023 20:46)  Synthroid 75 mcg (0.075 mg) oral tablet: 1 tab(s) orally once a day (17 Dec 2023 20:46)      REVIEW OF SYSTEMS:  Constitutional: [ ] negative [ ] fevers [ ] chills [ ] weight loss [ ] weight gain  HEENT: [ ] negative [ ] dry eyes [ ] eye irritation [ ] postnasal drip [ ] nasal congestion  CV: [ ] negative  [ ] chest pain [ ] orthopnea [ ] palpitations [ ] murmur  Resp: [ ] negative [ ] cough [ ] shortness of breath [ ] dyspnea [ ] wheezing [ ] sputum [ ] hemoptysis  GI: [ ] negative [ ] nausea [ ] vomiting [ ] diarrhea [ ] constipation [ ] abd pain [ ] dysphagia   : [ ] negative [ ] dysuria [ ] nocturia [ ] hematuria [ ] increased urinary frequency  Musculoskeletal: [ ] negative [ ] back pain [ ] myalgias [ ] arthralgias [ ] fracture  Skin: [ ] negative [ ] rash [ ] itch  Neurological: [ ] negative [ ] headache [ ] dizziness [ ] syncope [ ] weakness [ ] numbness  Psychiatric: [ ] negative [ ] anxiety [ ] depression  Endocrine: [ ] negative [ ] diabetes [ ] thyroid problem  Hematologic/Lymphatic: [ ] negative [ ] anemia [ ] bleeding problem  Allergic/Immunologic: [ ] negative [ ] itchy eyes [ ] nasal discharge [ ] hives [ ] angioedema  [ ] All other systems negative  [ ] Unable to assess ROS because ________    OBJECTIVE:  ICU Vital Signs Last 24 Hrs  T(C): 36.9 (21 Dec 2023 05:00), Max: 37 (20 Dec 2023 21:39)  T(F): 98.5 (21 Dec 2023 05:00), Max: 98.6 (20 Dec 2023 21:39)  HR: 68 (21 Dec 2023 06:50) (63 - 96)  BP: 145/60 (21 Dec 2023 09:03) (129/63 - 171/68)  BP(mean): --  ABP: --  ABP(mean): --  RR: 18 (21 Dec 2023 05:00) (16 - 18)  SpO2: 96% (21 Dec 2023 09:03) (96% - 100%)    O2 Parameters below as of 21 Dec 2023 09:03  Patient On (Oxygen Delivery Method): room air              12-20 @ 07:01  -  12-21 @ 07:00  --------------------------------------------------------  IN: 600 mL / OUT: 1300 mL / NET: -700 mL      CAPILLARY BLOOD GLUCOSE      POCT Blood Glucose.: 166 mg/dL (21 Dec 2023 08:38)      PHYSICAL EXAM:  General: awake and alert, nontoxic appearing *** lying in bed  HEENT: NC/AT, EOMI b/l, conjunctiva normal, MMM  Lymph Nodes: no cervical LAD  Neck: supple. full range of motion  Respiratory: CTA b/l, no w/r/c, appears comfortable on ***, no conversational dyspnea or accessory muscle use  Cardiovascular: S1 S2 present, RRR, no m/r/g  Abdomen: soft, NT/ND, +BS  Extremities: no c/c/e  Skin: no rashes or lesions noted  Neurological: AAOx3, no focal deficits  Psychiatry: calm, cooperative    LINES:     HOSPITAL MEDICATIONS:  Standing Meds:  albuterol/ipratropium for Nebulization 3 milliLiter(s) Nebulizer every 6 hours  atorvastatin 20 milliGRAM(s) Oral at bedtime  azithromycin  IVPB 500 milliGRAM(s) IV Intermittent every 24 hours  benzonatate 100 milliGRAM(s) Oral three times a day  cefTRIAXone   IVPB 1000 milliGRAM(s) IV Intermittent every 24 hours  chlorhexidine 2% Cloths 1 Application(s) Topical <User Schedule>  dextrose 5%. 1000 milliLiter(s) IV Continuous <Continuous>  dextrose 50% Injectable 25 Gram(s) IV Push once  famotidine    Tablet 20 milliGRAM(s) Oral daily  glucagon  Injectable 1 milliGRAM(s) IntraMuscular once  heparin   Injectable 5000 Unit(s) SubCutaneous every 12 hours  influenza  Vaccine (HIGH DOSE) 0.7 milliLiter(s) IntraMuscular once  insulin glargine Injectable (LANTUS) 12 Unit(s) SubCutaneous at bedtime  insulin lispro (ADMELOG) corrective regimen sliding scale   SubCutaneous three times a day before meals  insulin lispro (ADMELOG) corrective regimen sliding scale   SubCutaneous at bedtime  levothyroxine 75 MICROGram(s) Oral daily  methylPREDNISolone sodium succinate Injectable 20 milliGRAM(s) IV Push every 12 hours  mupirocin 2% Nasal 1 Application(s) Both Nostrils two times a day  sevelamer carbonate 800 milliGRAM(s) Oral <User Schedule>      PRN Meds:  guaiFENesin Oral Liquid (Sugar-Free) 100 milliGRAM(s) Oral every 6 hours PRN      LABS:                        10.7   8.04  )-----------( 222      ( 20 Dec 2023 06:59 )             33.3     Hgb Trend: 10.7<--, 10.4<--, 9.7<--, 10.0<--  12-20    134<L>  |  92<L>  |  36<H>  ----------------------------<  147<H>  4.1   |  29  |  5.35<H>    Ca    9.6      20 Dec 2023 06:59  Phos  4.8     12-20  Mg     2.2     12-20    TPro  6.7  /  Alb  3.5  /  TBili  0.2  /  DBili  x   /  AST  22  /  ALT  22  /  AlkPhos  83  12-20    Creatinine Trend: 5.35<--, 3.91<--, 6.69<--, 5.69<--    Urinalysis Basic - ( 20 Dec 2023 06:59 )    Color: x / Appearance: x / SG: x / pH: x  Gluc: 147 mg/dL / Ketone: x  / Bili: x / Urobili: x   Blood: x / Protein: x / Nitrite: x   Leuk Esterase: x / RBC: x / WBC x   Sq Epi: x / Non Sq Epi: x / Bacteria: x      Arterial Blood Gas:  12-19 @ 14:17  7.29/71/48/34/83.5/6.4  ABG lactate: --    Venous Blood Gas:  12-20 @ 16:54  7.33/61/164/32/100.0  VBG Lactate: 2.5  Venous Blood Gas:  12-20 @ 07:08  7.26/75/33/34/55.1  VBG Lactate: 1.5      MICROBIOLOGY:       RADIOLOGY:  [ ] Reviewed and interpreted by me    PULMONARY FUNCTION TESTS:    EKG:   CHIEF COMPLAINT:    HPI:  HPI:  83F R breast CA (s/p mastectomy), ESRD on HD Mon/Tues/Thurs/Fri, HLD, T2DM, hypothyroidism, presenting with productive cough and SOB for about 1 week. Patient accompanied by family who is assisting in history. Family reports pt had a cough that was initially dry and eventually became productive last few days. She has mild MCLEOD at baseline, but it has worsened during this time. She had fevers at home with Tmax 101 this AM. She was seen by her PCP earlier this month and had CXR which showed no consolidation. Was given doxycycline on Friday with minimal improvement.  Of note recently returned from [place], [place].     On arrival to ED, HDS, Tmax 100.2, SpO2 100% on 8L NRB. She subsequently had worsening WOB, so was transitioned to BiPAP. Initial workup remarkable for ABG 7.33/58/139/1.9 on BiPAP. +RSV. CXR with R perihilar opaciity. Given ceftriaxone, azithromycin, 40 mg solumedrol, and duoneb x 2. Admitted to medicine for further workup.  (17 Dec 2023 19:17)    Pulm consulted for possible superimposed bacterial infection on RSV pneumonitis, with possible obesity hypoventilation syndrome, and possible fluid overload   was initially borderline febrile on admission now afebrile, was on hiflo, BIPAP and now ambulates on RA wihtout desaturation 93% during the days remains hypercapnic on ABG 50-60-70, occasionally uncompensated.  presented with 7.33/58/139/31/100   most recent 7.33/61/164/32/100 lac 2.5  CT chest with tracheal secretions, nodules, clusters, calcified granulomas,   ECHO PASP not estimated, RA/LA dilated, LV low borderline normal , nml RV systolic function  on ceftriaxone, azithromycin, solu medrol 20 BID  on 10/5 may have been on CPAP  not followed by pulm as an outpatient had echo in 2018 with mild diastolic dysfunction    never smoker, no asthma/COPD, no wheezing, not on steroids, minmal leg swelling, no rothpnea, some MCLEOD, no PND. not on CPAP at home,   born in [place] , works in [employer], no occupational exposures  has productive cough, improving, no hemopytsis        PAST MEDICAL & SURGICAL HISTORY:  Renal failure, chronic      Hypertension      ESRD on dialysis      Diabetes      History of hypothyroidism      HLD (hyperlipidemia)      Breast cancer      H/O right knee surgery      S/P mastectomy          FAMILY HISTORY:  No pertinent family history in first degree relatives        SOCIAL HISTORY:  denies smoking    Allergies    penicillin (Unknown)  vancomycin (Rash)    Intolerances        HOME MEDICATIONS:  Home Medications:  atorvastatin 20 mg oral tablet: 1 tab(s) orally once a day (17 Dec 2023 20:46)  famotidine 20 mg oral tablet: 1 tab(s) orally once a day (17 Dec 2023 20:46)  Lantus 100 units/mL subcutaneous solution: 16 unit(s) subcutaneous once a day with lunch (17 Dec 2023 20:46)  Renvela 800 mg oral tablet: 1 tab(s) orally once a day (17 Dec 2023 20:46)  Synthroid 75 mcg (0.075 mg) oral tablet: 1 tab(s) orally once a day (17 Dec 2023 20:46)      REVIEW OF SYSTEMS:  Patient denies fevers, chills, chest pain, nausea, abdominal pain, diarrhea, constipation, dysuria, leg swelling, headache, light headedness    OBJECTIVE:  ICU Vital Signs Last 24 Hrs  T(C): 36.9 (21 Dec 2023 05:00), Max: 37 (20 Dec 2023 21:39)  T(F): 98.5 (21 Dec 2023 05:00), Max: 98.6 (20 Dec 2023 21:39)  HR: 68 (21 Dec 2023 06:50) (63 - 96)  BP: 145/60 (21 Dec 2023 09:03) (129/63 - 171/68)  BP(mean): --  ABP: --  ABP(mean): --  RR: 18 (21 Dec 2023 05:00) (16 - 18)  SpO2: 96% (21 Dec 2023 09:03) (96% - 100%)    O2 Parameters below as of 21 Dec 2023 09:03  Patient On (Oxygen Delivery Method): room air              12-20 @ 07:01  -  12-21 @ 07:00  --------------------------------------------------------  IN: 600 mL / OUT: 1300 mL / NET: -700 mL      CAPILLARY BLOOD GLUCOSE      POCT Blood Glucose.: 166 mg/dL (21 Dec 2023 08:38)      PHYSICAL EXAM:  General: awake and alert, nontoxic appearing *** lying in bed  HEENT: NC/AT, EOMI b/l, conjunctiva normal, MMM  Lymph Nodes: no cervical LAD  Neck: supple. full range of motion  Respiratory: rhonchi appears comfortable on RA, no conversational dyspnea or accessory muscle use  Cardiovascular: S1 S2 present, RRR, no m/r/g  Abdomen: soft, NT/ND, +BS  Extremities: no c/c/e  Skin: no rashes or lesions noted  Neurological: AAOx3, no focal deficits  Psychiatry: calm, cooperative    LINES:     HOSPITAL MEDICATIONS:  Standing Meds:  albuterol/ipratropium for Nebulization 3 milliLiter(s) Nebulizer every 6 hours  atorvastatin 20 milliGRAM(s) Oral at bedtime  azithromycin  IVPB 500 milliGRAM(s) IV Intermittent every 24 hours  benzonatate 100 milliGRAM(s) Oral three times a day  cefTRIAXone   IVPB 1000 milliGRAM(s) IV Intermittent every 24 hours  chlorhexidine 2% Cloths 1 Application(s) Topical <User Schedule>  dextrose 5%. 1000 milliLiter(s) IV Continuous <Continuous>  dextrose 50% Injectable 25 Gram(s) IV Push once  famotidine    Tablet 20 milliGRAM(s) Oral daily  glucagon  Injectable 1 milliGRAM(s) IntraMuscular once  heparin   Injectable 5000 Unit(s) SubCutaneous every 12 hours  influenza  Vaccine (HIGH DOSE) 0.7 milliLiter(s) IntraMuscular once  insulin glargine Injectable (LANTUS) 12 Unit(s) SubCutaneous at bedtime  insulin lispro (ADMELOG) corrective regimen sliding scale   SubCutaneous three times a day before meals  insulin lispro (ADMELOG) corrective regimen sliding scale   SubCutaneous at bedtime  levothyroxine 75 MICROGram(s) Oral daily  methylPREDNISolone sodium succinate Injectable 20 milliGRAM(s) IV Push every 12 hours  mupirocin 2% Nasal 1 Application(s) Both Nostrils two times a day  sevelamer carbonate 800 milliGRAM(s) Oral <User Schedule>      PRN Meds:  guaiFENesin Oral Liquid (Sugar-Free) 100 milliGRAM(s) Oral every 6 hours PRN      LABS:                        10.7   8.04  )-----------( 222      ( 20 Dec 2023 06:59 )             33.3     Hgb Trend: 10.7<--, 10.4<--, 9.7<--, 10.0<--  12-20    134<L>  |  92<L>  |  36<H>  ----------------------------<  147<H>  4.1   |  29  |  5.35<H>    Ca    9.6      20 Dec 2023 06:59  Phos  4.8     12-20  Mg     2.2     12-20    TPro  6.7  /  Alb  3.5  /  TBili  0.2  /  DBili  x   /  AST  22  /  ALT  22  /  AlkPhos  83  12-20    Creatinine Trend: 5.35<--, 3.91<--, 6.69<--, 5.69<--    Urinalysis Basic - ( 20 Dec 2023 06:59 )    Color: x / Appearance: x / SG: x / pH: x  Gluc: 147 mg/dL / Ketone: x  / Bili: x / Urobili: x   Blood: x / Protein: x / Nitrite: x   Leuk Esterase: x / RBC: x / WBC x   Sq Epi: x / Non Sq Epi: x / Bacteria: x      Arterial Blood Gas:  12-19 @ 14:17  7.29/71/48/34/83.5/6.4  ABG lactate: --    Venous Blood Gas:  12-20 @ 16:54  7.33/61/164/32/100.0  VBG Lactate: 2.5  Venous Blood Gas:  12-20 @ 07:08  7.26/75/33/34/55.1  VBG Lactate: 1.5      MICROBIOLOGY:       RADIOLOGY:  [ ] Reviewed and interpreted by me    PULMONARY FUNCTION TESTS:    EKG:   CHIEF COMPLAINT:    HPI:  HPI:  83F R breast CA (s/p mastectomy), ESRD on HD Mon/Tues/Thurs/Fri, HLD, T2DM, hypothyroidism, presenting with productive cough and SOB for about 1 week. Patient accompanied by family who is assisting in history. Family reports pt had a cough that was initially dry and eventually became productive last few days. She has mild MCLEOD at baseline, but it has worsened during this time. She had fevers at home with Tmax 101 this AM. She was seen by her PCP earlier this month and had CXR which showed no consolidation. Was given doxycycline on Friday with minimal improvement.  Of note recently returned from [place], [place].     On arrival to ED, HDS, Tmax 100.2, SpO2 100% on 8L NRB. She subsequently had worsening WOB, so was transitioned to BiPAP. Initial workup remarkable for ABG 7.33/58/139/1.9 on BiPAP. +RSV. CXR with R perihilar opaciity. Given ceftriaxone, azithromycin, 40 mg solumedrol, and duoneb x 2. Admitted to medicine for further workup.  (17 Dec 2023 19:17)    Pulm consulted for possible superimposed bacterial infection on RSV pneumonitis, with possible obesity hypoventilation syndrome, and possible fluid overload   was initially borderline febrile on admission now afebrile, was on hiflo, BIPAP and now ambulates on RA wihtout desaturation 93% during the days remains hypercapnic on ABG 50-60-70, occasionally uncompensated.  presented with 7.33/58/139/31/100   most recent 7.33/61/164/32/100 lac 2.5  CT chest with tracheal secretions, nodules, clusters, calcified granulomas,   ECHO PASP not estimated, RA/LA dilated, LV low borderline normal , nml RV systolic function  on ceftriaxone, azithromycin, solu medrol 20 BID  on 10/5 may have been on CPAP  not followed by pulm as an outpatient had echo in 2018 with mild diastolic dysfunction    never smoker, no asthma/COPD, no wheezing, not on steroids, minmal leg swelling, no rothpnea, some MCLEOD, no PND. not on CPAP at home,   born in [place] , works in [employer], no occupational exposures  has productive cough, improving, no hemopytsis        PAST MEDICAL & SURGICAL HISTORY:  Renal failure, chronic      Hypertension      ESRD on dialysis      Diabetes      History of hypothyroidism      HLD (hyperlipidemia)      Breast cancer      H/O right knee surgery      S/P mastectomy          FAMILY HISTORY:  No pertinent family history in first degree relatives        SOCIAL HISTORY:  denies smoking    Allergies    penicillin (Unknown)  vancomycin (Rash)    Intolerances        HOME MEDICATIONS:  Home Medications:  atorvastatin 20 mg oral tablet: 1 tab(s) orally once a day (17 Dec 2023 20:46)  famotidine 20 mg oral tablet: 1 tab(s) orally once a day (17 Dec 2023 20:46)  Lantus 100 units/mL subcutaneous solution: 16 unit(s) subcutaneous once a day with lunch (17 Dec 2023 20:46)  Renvela 800 mg oral tablet: 1 tab(s) orally once a day (17 Dec 2023 20:46)  Synthroid 75 mcg (0.075 mg) oral tablet: 1 tab(s) orally once a day (17 Dec 2023 20:46)      REVIEW OF SYSTEMS:  Patient denies fevers, chills, chest pain, nausea, abdominal pain, diarrhea, constipation, dysuria, leg swelling, headache, light headedness    OBJECTIVE:  ICU Vital Signs Last 24 Hrs  T(C): 36.9 (21 Dec 2023 05:00), Max: 37 (20 Dec 2023 21:39)  T(F): 98.5 (21 Dec 2023 05:00), Max: 98.6 (20 Dec 2023 21:39)  HR: 68 (21 Dec 2023 06:50) (63 - 96)  BP: 145/60 (21 Dec 2023 09:03) (129/63 - 171/68)  BP(mean): --  ABP: --  ABP(mean): --  RR: 18 (21 Dec 2023 05:00) (16 - 18)  SpO2: 96% (21 Dec 2023 09:03) (96% - 100%)    O2 Parameters below as of 21 Dec 2023 09:03  Patient On (Oxygen Delivery Method): room air              12-20 @ 07:01  -  12-21 @ 07:00  --------------------------------------------------------  IN: 600 mL / OUT: 1300 mL / NET: -700 mL      CAPILLARY BLOOD GLUCOSE      POCT Blood Glucose.: 166 mg/dL (21 Dec 2023 08:38)      PHYSICAL EXAM:  General: awake and alert, nontoxic appearing *** lying in bed  HEENT: NC/AT, EOMI b/l, conjunctiva normal, MMM  Lymph Nodes: no cervical LAD  Neck: supple. full range of motion  Respiratory: rhonchi appears comfortable on RA, no conversational dyspnea or accessory muscle use  Cardiovascular: S1 S2 present, RRR, no m/r/g  Abdomen: soft, NT/ND, +BS  Extremities: no c/c/e  Skin: no rashes or lesions noted  Neurological: AAOx3, no focal deficits  Psychiatry: calm, cooperative    LINES:     HOSPITAL MEDICATIONS:  Standing Meds:  albuterol/ipratropium for Nebulization 3 milliLiter(s) Nebulizer every 6 hours  atorvastatin 20 milliGRAM(s) Oral at bedtime  azithromycin  IVPB 500 milliGRAM(s) IV Intermittent every 24 hours  benzonatate 100 milliGRAM(s) Oral three times a day  cefTRIAXone   IVPB 1000 milliGRAM(s) IV Intermittent every 24 hours  chlorhexidine 2% Cloths 1 Application(s) Topical <User Schedule>  dextrose 5%. 1000 milliLiter(s) IV Continuous <Continuous>  dextrose 50% Injectable 25 Gram(s) IV Push once  famotidine    Tablet 20 milliGRAM(s) Oral daily  glucagon  Injectable 1 milliGRAM(s) IntraMuscular once  heparin   Injectable 5000 Unit(s) SubCutaneous every 12 hours  influenza  Vaccine (HIGH DOSE) 0.7 milliLiter(s) IntraMuscular once  insulin glargine Injectable (LANTUS) 12 Unit(s) SubCutaneous at bedtime  insulin lispro (ADMELOG) corrective regimen sliding scale   SubCutaneous three times a day before meals  insulin lispro (ADMELOG) corrective regimen sliding scale   SubCutaneous at bedtime  levothyroxine 75 MICROGram(s) Oral daily  methylPREDNISolone sodium succinate Injectable 20 milliGRAM(s) IV Push every 12 hours  mupirocin 2% Nasal 1 Application(s) Both Nostrils two times a day  sevelamer carbonate 800 milliGRAM(s) Oral <User Schedule>      PRN Meds:  guaiFENesin Oral Liquid (Sugar-Free) 100 milliGRAM(s) Oral every 6 hours PRN      LABS:                        10.7   8.04  )-----------( 222      ( 20 Dec 2023 06:59 )             33.3     Hgb Trend: 10.7<--, 10.4<--, 9.7<--, 10.0<--  12-20    134<L>  |  92<L>  |  36<H>  ----------------------------<  147<H>  4.1   |  29  |  5.35<H>    Ca    9.6      20 Dec 2023 06:59  Phos  4.8     12-20  Mg     2.2     12-20    TPro  6.7  /  Alb  3.5  /  TBili  0.2  /  DBili  x   /  AST  22  /  ALT  22  /  AlkPhos  83  12-20    Creatinine Trend: 5.35<--, 3.91<--, 6.69<--, 5.69<--    Urinalysis Basic - ( 20 Dec 2023 06:59 )    Color: x / Appearance: x / SG: x / pH: x  Gluc: 147 mg/dL / Ketone: x  / Bili: x / Urobili: x   Blood: x / Protein: x / Nitrite: x   Leuk Esterase: x / RBC: x / WBC x   Sq Epi: x / Non Sq Epi: x / Bacteria: x      Arterial Blood Gas:  12-19 @ 14:17  7.29/71/48/34/83.5/6.4  ABG lactate: --    Venous Blood Gas:  12-20 @ 16:54  7.33/61/164/32/100.0  VBG Lactate: 2.5  Venous Blood Gas:  12-20 @ 07:08  7.26/75/33/34/55.1  VBG Lactate: 1.5      MICROBIOLOGY:       RADIOLOGY:  [ ] Reviewed and interpreted by me    PULMONARY FUNCTION TESTS:    EKG:

## 2023-12-21 NOTE — PROGRESS NOTE ADULT - SUBJECTIVE AND OBJECTIVE BOX
PROGRESS NOTE:   Authored by Dr. Kimberlee Moscoso MD (PGY-1). Pager [facility] [phone] / [place] ( [id number]) or via TEAMS    Patient is a 83y old  Female who presents with a chief complaint of AHRF (20 Dec 2023 17:07)      SUBJECTIVE / OVERNIGHT EVENTS:  No acute events overnight.     ADDITIONAL REVIEW OF SYSTEMS:  Patient denies fevers, chills, chest pain, shortness of breath, nausea, abdominal pain, diarrhea, constipation, dysuria, leg swelling, headache, light headedness.    MEDICATIONS  (STANDING):  albuterol/ipratropium for Nebulization 3 milliLiter(s) Nebulizer every 6 hours  atorvastatin 20 milliGRAM(s) Oral at bedtime  azithromycin  IVPB 500 milliGRAM(s) IV Intermittent every 24 hours  benzonatate 100 milliGRAM(s) Oral three times a day  cefTRIAXone   IVPB 1000 milliGRAM(s) IV Intermittent every 24 hours  chlorhexidine 2% Cloths 1 Application(s) Topical <User Schedule>  dextrose 5%. 1000 milliLiter(s) (100 mL/Hr) IV Continuous <Continuous>  dextrose 50% Injectable 25 Gram(s) IV Push once  famotidine    Tablet 20 milliGRAM(s) Oral daily  glucagon  Injectable 1 milliGRAM(s) IntraMuscular once  heparin   Injectable 5000 Unit(s) SubCutaneous every 12 hours  influenza  Vaccine (HIGH DOSE) 0.7 milliLiter(s) IntraMuscular once  insulin glargine Injectable (LANTUS) 12 Unit(s) SubCutaneous at bedtime  insulin lispro (ADMELOG) corrective regimen sliding scale   SubCutaneous three times a day before meals  insulin lispro (ADMELOG) corrective regimen sliding scale   SubCutaneous at bedtime  levothyroxine 75 MICROGram(s) Oral daily  methylPREDNISolone sodium succinate Injectable 20 milliGRAM(s) IV Push every 12 hours  mupirocin 2% Nasal 1 Application(s) Both Nostrils two times a day  sevelamer carbonate 800 milliGRAM(s) Oral <User Schedule>    MEDICATIONS  (PRN):  guaiFENesin Oral Liquid (Sugar-Free) 100 milliGRAM(s) Oral every 6 hours PRN Cough      CAPILLARY BLOOD GLUCOSE      POCT Blood Glucose.: 241 mg/dL (20 Dec 2023 22:17)  POCT Blood Glucose.: 253 mg/dL (20 Dec 2023 16:55)  POCT Blood Glucose.: 129 mg/dL (20 Dec 2023 13:56)  POCT Blood Glucose.: 142 mg/dL (20 Dec 2023 11:39)    I&O's Summary    20 Dec 2023 07:01  -  21 Dec 2023 07:00  --------------------------------------------------------  IN: 600 mL / OUT: 1300 mL / NET: -700 mL        PHYSICAL EXAM:  Vital Signs Last 24 Hrs  T(C): 36.9 (21 Dec 2023 05:00), Max: 37 (20 Dec 2023 21:39)  T(F): 98.5 (21 Dec 2023 05:00), Max: 98.6 (20 Dec 2023 21:39)  HR: 68 (21 Dec 2023 06:50) (63 - 96)  BP: 151/73 (21 Dec 2023 05:00) (129/63 - 171/68)  BP(mean): --  RR: 18 (21 Dec 2023 05:00) (16 - 18)  SpO2: 100% (21 Dec 2023 06:50) (96% - 100%)    Parameters below as of 21 Dec 2023 05:00  Patient On (Oxygen Delivery Method): nasal cannula  O2 Flow (L/min): 2      CONSTITUTIONAL: NAD, well-developed  RESPIRATORY: Normal respiratory effort; lungs are clear to auscultation bilaterally  CARDIOVASCULAR: Regular rate and rhythm, normal S1 and S2, no murmur/rub/gallop; No lower extremity edema; Peripheral pulses are 2+ bilaterally  ABDOMEN: Nontender to palpation, normoactive bowel sounds, no rebound/guarding; No hepatosplenomegaly  MSK: no clubbing or cyanosis of digits; no joint swelling or tenderness to palpation  PSYCH: A+O to person, place, and time; affect appropriate    LABS:                        10.7   8.04  )-----------( 222      ( 20 Dec 2023 06:59 )             33.3     12-20    134<L>  |  92<L>  |  36<H>  ----------------------------<  147<H>  4.1   |  29  |  5.35<H>    Ca    9.6      20 Dec 2023 06:59  Phos  4.8     12-20  Mg     2.2     12-20    TPro  6.7  /  Alb  3.5  /  TBili  0.2  /  DBili  x   /  AST  22  /  ALT  22  /  AlkPhos  83  12-20          Urinalysis Basic - ( 20 Dec 2023 06:59 )    Color: x / Appearance: x / SG: x / pH: x  Gluc: 147 mg/dL / Ketone: x  / Bili: x / Urobili: x   Blood: x / Protein: x / Nitrite: x   Leuk Esterase: x / RBC: x / WBC x   Sq Epi: x / Non Sq Epi: x / Bacteria: x          Tele Reviewed:    RADIOLOGY & ADDITIONAL TESTS:  Results Reviewed:   Imaging Personally Reviewed:  Electrocardiogram Personally Reviewed:     PROGRESS NOTE:   Authored by Dr. Kimberlee Moscoso MD (PGY-1). Pager [facility] [phone] / [place] ( [id number]) or via TEAMS    Patient is a 83y old  Female who presents with a chief complaint of AHRF (20 Dec 2023 17:07)      SUBJECTIVE / OVERNIGHT EVENTS:  No acute events overnight.     ADDITIONAL REVIEW OF SYSTEMS:  Patient denies fevers, chills, chest pain, shortness of breath, nausea, abdominal pain, diarrhea, constipation, dysuria, leg swelling, headache, light headedness.    MEDICATIONS  (STANDING):  albuterol/ipratropium for Nebulization 3 milliLiter(s) Nebulizer every 6 hours  atorvastatin 20 milliGRAM(s) Oral at bedtime  azithromycin  IVPB 500 milliGRAM(s) IV Intermittent every 24 hours  benzonatate 100 milliGRAM(s) Oral three times a day  cefTRIAXone   IVPB 1000 milliGRAM(s) IV Intermittent every 24 hours  chlorhexidine 2% Cloths 1 Application(s) Topical <User Schedule>  dextrose 5%. 1000 milliLiter(s) (100 mL/Hr) IV Continuous <Continuous>  dextrose 50% Injectable 25 Gram(s) IV Push once  famotidine    Tablet 20 milliGRAM(s) Oral daily  glucagon  Injectable 1 milliGRAM(s) IntraMuscular once  heparin   Injectable 5000 Unit(s) SubCutaneous every 12 hours  influenza  Vaccine (HIGH DOSE) 0.7 milliLiter(s) IntraMuscular once  insulin glargine Injectable (LANTUS) 12 Unit(s) SubCutaneous at bedtime  insulin lispro (ADMELOG) corrective regimen sliding scale   SubCutaneous three times a day before meals  insulin lispro (ADMELOG) corrective regimen sliding scale   SubCutaneous at bedtime  levothyroxine 75 MICROGram(s) Oral daily  methylPREDNISolone sodium succinate Injectable 20 milliGRAM(s) IV Push every 12 hours  mupirocin 2% Nasal 1 Application(s) Both Nostrils two times a day  sevelamer carbonate 800 milliGRAM(s) Oral <User Schedule>    MEDICATIONS  (PRN):  guaiFENesin Oral Liquid (Sugar-Free) 100 milliGRAM(s) Oral every 6 hours PRN Cough      CAPILLARY BLOOD GLUCOSE      POCT Blood Glucose.: 241 mg/dL (20 Dec 2023 22:17)  POCT Blood Glucose.: 253 mg/dL (20 Dec 2023 16:55)  POCT Blood Glucose.: 129 mg/dL (20 Dec 2023 13:56)  POCT Blood Glucose.: 142 mg/dL (20 Dec 2023 11:39)    I&O's Summary    20 Dec 2023 07:01  -  21 Dec 2023 07:00  --------------------------------------------------------  IN: 600 mL / OUT: 1300 mL / NET: -700 mL        PHYSICAL EXAM:  Vital Signs Last 24 Hrs  T(C): 36.9 (21 Dec 2023 05:00), Max: 37 (20 Dec 2023 21:39)  T(F): 98.5 (21 Dec 2023 05:00), Max: 98.6 (20 Dec 2023 21:39)  HR: 68 (21 Dec 2023 06:50) (63 - 96)  BP: 151/73 (21 Dec 2023 05:00) (129/63 - 171/68)  BP(mean): --  RR: 18 (21 Dec 2023 05:00) (16 - 18)  SpO2: 100% (21 Dec 2023 06:50) (96% - 100%)    Parameters below as of 21 Dec 2023 05:00  Patient On (Oxygen Delivery Method): nasal cannula  O2 Flow (L/min): 2      CONSTITUTIONAL: NAD, well-developed  RESPIRATORY: Normal respiratory effort; lungs are clear to auscultation bilaterally  CARDIOVASCULAR: Regular rate and rhythm, normal S1 and S2, no murmur/rub/gallop; No lower extremity edema; Peripheral pulses are 2+ bilaterally  ABDOMEN: Nontender to palpation, normoactive bowel sounds, no rebound/guarding; No hepatosplenomegaly  MSK: no clubbing or cyanosis of digits; no joint swelling or tenderness to palpation  PSYCH: A+O to person, place, and time; affect appropriate    LABS:                        10.7   8.04  )-----------( 222      ( 20 Dec 2023 06:59 )             33.3     12-20    134<L>  |  92<L>  |  36<H>  ----------------------------<  147<H>  4.1   |  29  |  5.35<H>    Ca    9.6      20 Dec 2023 06:59  Phos  4.8     12-20  Mg     2.2     12-20    TPro  6.7  /  Alb  3.5  /  TBili  0.2  /  DBili  x   /  AST  22  /  ALT  22  /  AlkPhos  83  12-20          Urinalysis Basic - ( 20 Dec 2023 06:59 )    Color: x / Appearance: x / SG: x / pH: x  Gluc: 147 mg/dL / Ketone: x  / Bili: x / Urobili: x   Blood: x / Protein: x / Nitrite: x   Leuk Esterase: x / RBC: x / WBC x   Sq Epi: x / Non Sq Epi: x / Bacteria: x          Tele Reviewed:    RADIOLOGY & ADDITIONAL TESTS:  Results Reviewed:   Imaging Personally Reviewed:  Electrocardiogram Personally Reviewed:     PROGRESS NOTE:   Authored by Dr. Kimberlee Moscoso MD (PGY-1). Pager [facility] [phone] / [place] ( [id number]) or via TEAMS    Patient is a 83y old  Female who presents with a chief complaint of AHRF (20 Dec 2023 17:07)      SUBJECTIVE / OVERNIGHT EVENTS:  No acute events overnight. Unable to tolerate BIPAP mask for more than 10 minutes. Denies CP, mild improvement in SOB. States does not want to stay tomorrow for inpatient dialysis. Denies fevers.       MEDICATIONS  (STANDING):  albuterol/ipratropium for Nebulization 3 milliLiter(s) Nebulizer every 6 hours  atorvastatin 20 milliGRAM(s) Oral at bedtime  azithromycin  IVPB 500 milliGRAM(s) IV Intermittent every 24 hours  benzonatate 100 milliGRAM(s) Oral three times a day  cefTRIAXone   IVPB 1000 milliGRAM(s) IV Intermittent every 24 hours  chlorhexidine 2% Cloths 1 Application(s) Topical <User Schedule>  dextrose 5%. 1000 milliLiter(s) (100 mL/Hr) IV Continuous <Continuous>  dextrose 50% Injectable 25 Gram(s) IV Push once  famotidine    Tablet 20 milliGRAM(s) Oral daily  glucagon  Injectable 1 milliGRAM(s) IntraMuscular once  heparin   Injectable 5000 Unit(s) SubCutaneous every 12 hours  influenza  Vaccine (HIGH DOSE) 0.7 milliLiter(s) IntraMuscular once  insulin glargine Injectable (LANTUS) 12 Unit(s) SubCutaneous at bedtime  insulin lispro (ADMELOG) corrective regimen sliding scale   SubCutaneous three times a day before meals  insulin lispro (ADMELOG) corrective regimen sliding scale   SubCutaneous at bedtime  levothyroxine 75 MICROGram(s) Oral daily  methylPREDNISolone sodium succinate Injectable 20 milliGRAM(s) IV Push every 12 hours  mupirocin 2% Nasal 1 Application(s) Both Nostrils two times a day  sevelamer carbonate 800 milliGRAM(s) Oral <User Schedule>    MEDICATIONS  (PRN):  guaiFENesin Oral Liquid (Sugar-Free) 100 milliGRAM(s) Oral every 6 hours PRN Cough      CAPILLARY BLOOD GLUCOSE      POCT Blood Glucose.: 241 mg/dL (20 Dec 2023 22:17)  POCT Blood Glucose.: 253 mg/dL (20 Dec 2023 16:55)  POCT Blood Glucose.: 129 mg/dL (20 Dec 2023 13:56)  POCT Blood Glucose.: 142 mg/dL (20 Dec 2023 11:39)    I&O's Summary    20 Dec 2023 07:01  -  21 Dec 2023 07:00  --------------------------------------------------------  IN: 600 mL / OUT: 1300 mL / NET: -700 mL        PHYSICAL EXAM:  Vital Signs Last 24 Hrs  T(C): 36.9 (21 Dec 2023 05:00), Max: 37 (20 Dec 2023 21:39)  T(F): 98.5 (21 Dec 2023 05:00), Max: 98.6 (20 Dec 2023 21:39)  HR: 68 (21 Dec 2023 06:50) (63 - 96)  BP: 151/73 (21 Dec 2023 05:00) (129/63 - 171/68)  BP(mean): --  RR: 18 (21 Dec 2023 05:00) (16 - 18)  SpO2: 100% (21 Dec 2023 06:50) (96% - 100%)    Parameters below as of 21 Dec 2023 05:00  Patient On (Oxygen Delivery Method): nasal cannula  O2 Flow (L/min): 2      CONSTITUTIONAL: NAD, well-developed res  RESPIRATORY: Normal respiratory effort; lungs are clear to auscultation bilaterally  CARDIOVASCULAR: Regular rate and rhythm, normal S1 and S2, no murmur/rub/gallop; No lower extremity edema; Peripheral pulses are 2+ bilaterally  ABDOMEN: Nontender to palpation, normoactive bowel sounds, no rebound/guarding; No hepatosplenomegaly  MSK: no clubbing or cyanosis of digits; no joint swelling or tenderness to palpation  PSYCH: A+O to person, place, and time; affect appropriate    LABS:                        10.7   8.04  )-----------( 222      ( 20 Dec 2023 06:59 )             33.3     12-20    134<L>  |  92<L>  |  36<H>  ----------------------------<  147<H>  4.1   |  29  |  5.35<H>    Ca    9.6      20 Dec 2023 06:59  Phos  4.8     12-20  Mg     2.2     12-20    TPro  6.7  /  Alb  3.5  /  TBili  0.2  /  DBili  x   /  AST  22  /  ALT  22  /  AlkPhos  83  12-20          Urinalysis Basic - ( 20 Dec 2023 06:59 )    Color: x / Appearance: x / SG: x / pH: x  Gluc: 147 mg/dL / Ketone: x  / Bili: x / Urobili: x   Blood: x / Protein: x / Nitrite: x   Leuk Esterase: x / RBC: x / WBC x   Sq Epi: x / Non Sq Epi: x / Bacteria: x          Tele Reviewed:    RADIOLOGY & ADDITIONAL TESTS:  Results Reviewed:   Imaging Personally Reviewed:  Electrocardiogram Personally Reviewed:     PROGRESS NOTE:   Authored by Dr. Kimberlee Moscoso MD (PGY-1). Pager [facility] [phone] / [place] ( [id number]) or via TEAMS    Patient is a 83y old  Female who presents with a chief complaint of AHRF (20 Dec 2023 17:07)      SUBJECTIVE / OVERNIGHT EVENTS:  No acute events overnight. Unable to tolerate BIPAP mask for more than 10 minutes. Denies CP, mild improvement in SOB. States does not want to stay tomorrow for inpatient dialysis. Denies fevers.       MEDICATIONS  (STANDING):  albuterol/ipratropium for Nebulization 3 milliLiter(s) Nebulizer every 6 hours  atorvastatin 20 milliGRAM(s) Oral at bedtime  azithromycin  IVPB 500 milliGRAM(s) IV Intermittent every 24 hours  benzonatate 100 milliGRAM(s) Oral three times a day  cefTRIAXone   IVPB 1000 milliGRAM(s) IV Intermittent every 24 hours  chlorhexidine 2% Cloths 1 Application(s) Topical <User Schedule>  dextrose 5%. 1000 milliLiter(s) (100 mL/Hr) IV Continuous <Continuous>  dextrose 50% Injectable 25 Gram(s) IV Push once  famotidine    Tablet 20 milliGRAM(s) Oral daily  glucagon  Injectable 1 milliGRAM(s) IntraMuscular once  heparin   Injectable 5000 Unit(s) SubCutaneous every 12 hours  influenza  Vaccine (HIGH DOSE) 0.7 milliLiter(s) IntraMuscular once  insulin glargine Injectable (LANTUS) 12 Unit(s) SubCutaneous at bedtime  insulin lispro (ADMELOG) corrective regimen sliding scale   SubCutaneous three times a day before meals  insulin lispro (ADMELOG) corrective regimen sliding scale   SubCutaneous at bedtime  levothyroxine 75 MICROGram(s) Oral daily  methylPREDNISolone sodium succinate Injectable 20 milliGRAM(s) IV Push every 12 hours  mupirocin 2% Nasal 1 Application(s) Both Nostrils two times a day  sevelamer carbonate 800 milliGRAM(s) Oral <User Schedule>    MEDICATIONS  (PRN):  guaiFENesin Oral Liquid (Sugar-Free) 100 milliGRAM(s) Oral every 6 hours PRN Cough      CAPILLARY BLOOD GLUCOSE      POCT Blood Glucose.: 241 mg/dL (20 Dec 2023 22:17)  POCT Blood Glucose.: 253 mg/dL (20 Dec 2023 16:55)  POCT Blood Glucose.: 129 mg/dL (20 Dec 2023 13:56)  POCT Blood Glucose.: 142 mg/dL (20 Dec 2023 11:39)    I&O's Summary    20 Dec 2023 07:01  -  21 Dec 2023 07:00  --------------------------------------------------------  IN: 600 mL / OUT: 1300 mL / NET: -700 mL        PHYSICAL EXAM:  Vital Signs Last 24 Hrs  T(C): 36.9 (21 Dec 2023 05:00), Max: 37 (20 Dec 2023 21:39)  T(F): 98.5 (21 Dec 2023 05:00), Max: 98.6 (20 Dec 2023 21:39)  HR: 68 (21 Dec 2023 06:50) (63 - 96)  BP: 151/73 (21 Dec 2023 05:00) (129/63 - 171/68)  BP(mean): --  RR: 18 (21 Dec 2023 05:00) (16 - 18)  SpO2: 100% (21 Dec 2023 06:50) (96% - 100%)    Parameters below as of 21 Dec 2023 05:00  Patient On (Oxygen Delivery Method): nasal cannula  O2 Flow (L/min): 2      CONSTITUTIONAL: NAD, well-developed res  RESPIRATORY: Normal respiratory effort; lungs are clear to auscultation bilaterally  CARDIOVASCULAR: Regular rate and rhythm, normal S1 and S2, no murmur/rub/gallop; No lower extremity edema; Peripheral pulses are 2+ bilaterally  ABDOMEN: Nontender to palpation, normoactive bowel sounds, no rebound/guarding; No hepatosplenomegaly  MSK: no clubbing or cyanosis of digits; no joint swelling or tenderness to palpation  PSYCH: A+O to person, place, and time; affect appropriate    LABS:                        10.7   8.04  )-----------( 222      ( 20 Dec 2023 06:59 )             33.3     12-20    134<L>  |  92<L>  |  36<H>  ----------------------------<  147<H>  4.1   |  29  |  5.35<H>    Ca    9.6      20 Dec 2023 06:59  Phos  4.8     12-20  Mg     2.2     12-20    TPro  6.7  /  Alb  3.5  /  TBili  0.2  /  DBili  x   /  AST  22  /  ALT  22  /  AlkPhos  83  12-20          Urinalysis Basic - ( 20 Dec 2023 06:59 )    Color: x / Appearance: x / SG: x / pH: x  Gluc: 147 mg/dL / Ketone: x  / Bili: x / Urobili: x   Blood: x / Protein: x / Nitrite: x   Leuk Esterase: x / RBC: x / WBC x   Sq Epi: x / Non Sq Epi: x / Bacteria: x          Tele Reviewed:    RADIOLOGY & ADDITIONAL TESTS:  Results Reviewed:   Imaging Personally Reviewed:  Electrocardiogram Personally Reviewed:

## 2023-12-21 NOTE — PROGRESS NOTE ADULT - PROBLEM SELECTOR PLAN 4
Elevated troponin to 313 on admission to 317, decreasing to 37   Chest pain-free, EKG on admission with sinus tach, LAD, no ST segment changes   Low suspicion for ACS at this time, trops likely elevated from ESRD +/- demand of sepsis    - monitor tele   - echo: WNL EF 51%  - TTE eval for WMAs

## 2023-12-21 NOTE — PROGRESS NOTE ADULT - PROBLEM SELECTOR PLAN 2
Likely 2/2  +RSV with possible superimposed PNA with thick secretions, small component of volume overload  Requiring new BiPAP 10/5 40% FiO2 in ED  - transitioned to HFNC 12/18 AM  - on NC 12/19, VBG showing likely chronic co2 retention pt may benefit from BIPAP/ CPAP nightly   - continue to encourage nightly CPAP use, can plan for outpatient sleep study/ PFTs   BNP elevated however more likely falsely elevated iso ESRD, less likely ADHF    - c/w ceftriaxone (12/17-12/21) and azithromycin (12/18-12/20)  - duonebs q6h  - hypertonic saline nebs for mobilization of secretions   - plan for iHD 12/18 with 2-3L UF  - f/u TTE Likely 2/2  +RSV with possible superimposed PNA with thick secretions, small component of volume overload  Requiring new BiPAP 10/5 40% FiO2 in ED  - transitioned to HFNC 12/18 AM  - on NC 12/19, VBG showing likely chronic co2 retention pt may benefit from BIPAP/ CPAP nightly   - continue to encourage nightly CPAP use, can plan for outpatient sleep study/ PFTs   BNP elevated however more likely falsely elevated iso ESRD, less likely ADHF    - c/w ceftriaxone (12/17-12/21) and azithromycin (12/18-12/20)  - duonebs q6h  - hypertonic saline nebs for mobilization of secretions   - Chest PT/ acapella   - f/u VBG  - plan for iHD 12/18 with 2-3L UF  - f/u TTE

## 2023-12-22 ENCOUNTER — TRANSCRIPTION ENCOUNTER (OUTPATIENT)
Age: 83
End: 2023-12-22

## 2023-12-22 VITALS
SYSTOLIC BLOOD PRESSURE: 144 MMHG | RESPIRATION RATE: 18 BRPM | DIASTOLIC BLOOD PRESSURE: 76 MMHG | OXYGEN SATURATION: 97 % | HEART RATE: 76 BPM | TEMPERATURE: 98 F

## 2023-12-22 PROBLEM — E78.5 HYPERLIPIDEMIA, UNSPECIFIED: Chronic | Status: ACTIVE | Noted: 2023-12-17

## 2023-12-22 PROBLEM — N18.9 CHRONIC KIDNEY DISEASE, UNSPECIFIED: Chronic | Status: ACTIVE | Noted: 2023-12-17

## 2023-12-22 PROBLEM — I10 ESSENTIAL (PRIMARY) HYPERTENSION: Chronic | Status: ACTIVE | Noted: 2023-12-17

## 2023-12-22 PROBLEM — Z86.39 PERSONAL HISTORY OF OTHER ENDOCRINE, NUTRITIONAL AND METABOLIC DISEASE: Chronic | Status: ACTIVE | Noted: 2023-12-17

## 2023-12-22 PROBLEM — C50.919 MALIGNANT NEOPLASM OF UNSPECIFIED SITE OF UNSPECIFIED FEMALE BREAST: Chronic | Status: ACTIVE | Noted: 2023-12-17

## 2023-12-22 PROBLEM — E11.9 TYPE 2 DIABETES MELLITUS WITHOUT COMPLICATIONS: Chronic | Status: ACTIVE | Noted: 2023-12-17

## 2023-12-22 PROBLEM — N18.6 END STAGE RENAL DISEASE: Chronic | Status: ACTIVE | Noted: 2023-12-17

## 2023-12-22 LAB
ALBUMIN SERPL ELPH-MCNC: 3.1 G/DL — LOW (ref 3.3–5)
ALBUMIN SERPL ELPH-MCNC: 3.1 G/DL — LOW (ref 3.3–5)
ALP SERPL-CCNC: 71 U/L — SIGNIFICANT CHANGE UP (ref 40–120)
ALP SERPL-CCNC: 71 U/L — SIGNIFICANT CHANGE UP (ref 40–120)
ALT FLD-CCNC: 18 U/L — SIGNIFICANT CHANGE UP (ref 10–45)
ALT FLD-CCNC: 18 U/L — SIGNIFICANT CHANGE UP (ref 10–45)
ANION GAP SERPL CALC-SCNC: 12 MMOL/L — SIGNIFICANT CHANGE UP (ref 5–17)
ANION GAP SERPL CALC-SCNC: 12 MMOL/L — SIGNIFICANT CHANGE UP (ref 5–17)
AST SERPL-CCNC: 16 U/L — SIGNIFICANT CHANGE UP (ref 10–40)
AST SERPL-CCNC: 16 U/L — SIGNIFICANT CHANGE UP (ref 10–40)
BILIRUB SERPL-MCNC: 0.2 MG/DL — SIGNIFICANT CHANGE UP (ref 0.2–1.2)
BILIRUB SERPL-MCNC: 0.2 MG/DL — SIGNIFICANT CHANGE UP (ref 0.2–1.2)
BUN SERPL-MCNC: 32 MG/DL — HIGH (ref 7–23)
BUN SERPL-MCNC: 32 MG/DL — HIGH (ref 7–23)
CALCIUM SERPL-MCNC: 9.7 MG/DL — SIGNIFICANT CHANGE UP (ref 8.4–10.5)
CALCIUM SERPL-MCNC: 9.7 MG/DL — SIGNIFICANT CHANGE UP (ref 8.4–10.5)
CHLORIDE SERPL-SCNC: 97 MMOL/L — SIGNIFICANT CHANGE UP (ref 96–108)
CHLORIDE SERPL-SCNC: 97 MMOL/L — SIGNIFICANT CHANGE UP (ref 96–108)
CO2 SERPL-SCNC: 28 MMOL/L — SIGNIFICANT CHANGE UP (ref 22–31)
CO2 SERPL-SCNC: 28 MMOL/L — SIGNIFICANT CHANGE UP (ref 22–31)
CREAT SERPL-MCNC: 4.83 MG/DL — HIGH (ref 0.5–1.3)
CREAT SERPL-MCNC: 4.83 MG/DL — HIGH (ref 0.5–1.3)
CULTURE RESULTS: SIGNIFICANT CHANGE UP
EGFR: 8 ML/MIN/1.73M2 — LOW
EGFR: 8 ML/MIN/1.73M2 — LOW
GAS PNL BLDA: SIGNIFICANT CHANGE UP
GAS PNL BLDA: SIGNIFICANT CHANGE UP
GLUCOSE BLDC GLUCOMTR-MCNC: 102 MG/DL — HIGH (ref 70–99)
GLUCOSE BLDC GLUCOMTR-MCNC: 102 MG/DL — HIGH (ref 70–99)
GLUCOSE BLDC GLUCOMTR-MCNC: 178 MG/DL — HIGH (ref 70–99)
GLUCOSE BLDC GLUCOMTR-MCNC: 178 MG/DL — HIGH (ref 70–99)
GLUCOSE SERPL-MCNC: 141 MG/DL — HIGH (ref 70–99)
GLUCOSE SERPL-MCNC: 141 MG/DL — HIGH (ref 70–99)
MAGNESIUM SERPL-MCNC: 2 MG/DL — SIGNIFICANT CHANGE UP (ref 1.6–2.6)
MAGNESIUM SERPL-MCNC: 2 MG/DL — SIGNIFICANT CHANGE UP (ref 1.6–2.6)
PHOSPHATE SERPL-MCNC: 3.6 MG/DL — SIGNIFICANT CHANGE UP (ref 2.5–4.5)
PHOSPHATE SERPL-MCNC: 3.6 MG/DL — SIGNIFICANT CHANGE UP (ref 2.5–4.5)
POTASSIUM SERPL-MCNC: 4.2 MMOL/L — SIGNIFICANT CHANGE UP (ref 3.5–5.3)
POTASSIUM SERPL-MCNC: 4.2 MMOL/L — SIGNIFICANT CHANGE UP (ref 3.5–5.3)
POTASSIUM SERPL-SCNC: 4.2 MMOL/L — SIGNIFICANT CHANGE UP (ref 3.5–5.3)
POTASSIUM SERPL-SCNC: 4.2 MMOL/L — SIGNIFICANT CHANGE UP (ref 3.5–5.3)
PROT SERPL-MCNC: 5.9 G/DL — LOW (ref 6–8.3)
PROT SERPL-MCNC: 5.9 G/DL — LOW (ref 6–8.3)
SODIUM SERPL-SCNC: 137 MMOL/L — SIGNIFICANT CHANGE UP (ref 135–145)
SODIUM SERPL-SCNC: 137 MMOL/L — SIGNIFICANT CHANGE UP (ref 135–145)
SPECIMEN SOURCE: SIGNIFICANT CHANGE UP

## 2023-12-22 PROCEDURE — 82330 ASSAY OF CALCIUM: CPT

## 2023-12-22 PROCEDURE — 84443 ASSAY THYROID STIM HORMONE: CPT

## 2023-12-22 PROCEDURE — 85730 THROMBOPLASTIN TIME PARTIAL: CPT

## 2023-12-22 PROCEDURE — 82746 ASSAY OF FOLIC ACID SERUM: CPT

## 2023-12-22 PROCEDURE — 85018 HEMOGLOBIN: CPT

## 2023-12-22 PROCEDURE — 80053 COMPREHEN METABOLIC PANEL: CPT

## 2023-12-22 PROCEDURE — 85027 COMPLETE CBC AUTOMATED: CPT

## 2023-12-22 PROCEDURE — 83880 ASSAY OF NATRIURETIC PEPTIDE: CPT

## 2023-12-22 PROCEDURE — 99232 SBSQ HOSP IP/OBS MODERATE 35: CPT

## 2023-12-22 PROCEDURE — 36415 COLL VENOUS BLD VENIPUNCTURE: CPT

## 2023-12-22 PROCEDURE — 83735 ASSAY OF MAGNESIUM: CPT

## 2023-12-22 PROCEDURE — 99285 EMERGENCY DEPT VISIT HI MDM: CPT

## 2023-12-22 PROCEDURE — 96375 TX/PRO/DX INJ NEW DRUG ADDON: CPT

## 2023-12-22 PROCEDURE — 85610 PROTHROMBIN TIME: CPT

## 2023-12-22 PROCEDURE — 99233 SBSQ HOSP IP/OBS HIGH 50: CPT | Mod: GC

## 2023-12-22 PROCEDURE — 94660 CPAP INITIATION&MGMT: CPT

## 2023-12-22 PROCEDURE — 83605 ASSAY OF LACTIC ACID: CPT

## 2023-12-22 PROCEDURE — 82947 ASSAY GLUCOSE BLOOD QUANT: CPT

## 2023-12-22 PROCEDURE — 94640 AIRWAY INHALATION TREATMENT: CPT

## 2023-12-22 PROCEDURE — 82435 ASSAY OF BLOOD CHLORIDE: CPT

## 2023-12-22 PROCEDURE — 87641 MR-STAPH DNA AMP PROBE: CPT

## 2023-12-22 PROCEDURE — 84132 ASSAY OF SERUM POTASSIUM: CPT

## 2023-12-22 PROCEDURE — 83036 HEMOGLOBIN GLYCOSYLATED A1C: CPT

## 2023-12-22 PROCEDURE — 96374 THER/PROPH/DIAG INJ IV PUSH: CPT

## 2023-12-22 PROCEDURE — C8929: CPT

## 2023-12-22 PROCEDURE — 87637 SARSCOV2&INF A&B&RSV AMP PRB: CPT

## 2023-12-22 PROCEDURE — 85014 HEMATOCRIT: CPT

## 2023-12-22 PROCEDURE — 84295 ASSAY OF SERUM SODIUM: CPT

## 2023-12-22 PROCEDURE — 87040 BLOOD CULTURE FOR BACTERIA: CPT

## 2023-12-22 PROCEDURE — 36600 WITHDRAWAL OF ARTERIAL BLOOD: CPT

## 2023-12-22 PROCEDURE — 82803 BLOOD GASES ANY COMBINATION: CPT

## 2023-12-22 PROCEDURE — 84484 ASSAY OF TROPONIN QUANT: CPT

## 2023-12-22 PROCEDURE — 82607 VITAMIN B-12: CPT

## 2023-12-22 PROCEDURE — 84100 ASSAY OF PHOSPHORUS: CPT

## 2023-12-22 PROCEDURE — 82962 GLUCOSE BLOOD TEST: CPT

## 2023-12-22 PROCEDURE — 71250 CT THORAX DX C-: CPT | Mod: MA

## 2023-12-22 PROCEDURE — 99261: CPT

## 2023-12-22 PROCEDURE — 82565 ASSAY OF CREATININE: CPT

## 2023-12-22 PROCEDURE — 87640 STAPH A DNA AMP PROBE: CPT

## 2023-12-22 PROCEDURE — 71045 X-RAY EXAM CHEST 1 VIEW: CPT

## 2023-12-22 PROCEDURE — 85025 COMPLETE CBC W/AUTO DIFF WBC: CPT

## 2023-12-22 PROCEDURE — 84145 PROCALCITONIN (PCT): CPT

## 2023-12-22 RX ORDER — IPRATROPIUM/ALBUTEROL SULFATE 18-103MCG
3 AEROSOL WITH ADAPTER (GRAM) INHALATION
Qty: 28 | Refills: 2
Start: 2023-12-22 | End: 2024-01-11

## 2023-12-22 RX ADMIN — Medication 100 MILLIGRAM(S): at 06:28

## 2023-12-22 RX ADMIN — Medication 3 MILLILITER(S): at 06:28

## 2023-12-22 RX ADMIN — Medication 20 MILLIGRAM(S): at 06:31

## 2023-12-22 RX ADMIN — CHLORHEXIDINE GLUCONATE 1 APPLICATION(S): 213 SOLUTION TOPICAL at 07:33

## 2023-12-22 RX ADMIN — SODIUM CHLORIDE 4 MILLILITER(S): 9 INJECTION INTRAMUSCULAR; INTRAVENOUS; SUBCUTANEOUS at 06:31

## 2023-12-22 RX ADMIN — Medication 100 MILLIGRAM(S): at 12:58

## 2023-12-22 RX ADMIN — FAMOTIDINE 20 MILLIGRAM(S): 10 INJECTION INTRAVENOUS at 12:51

## 2023-12-22 RX ADMIN — Medication 3 MILLILITER(S): at 12:51

## 2023-12-22 RX ADMIN — SODIUM CHLORIDE 4 MILLILITER(S): 9 INJECTION INTRAMUSCULAR; INTRAVENOUS; SUBCUTANEOUS at 12:51

## 2023-12-22 RX ADMIN — Medication 2: at 12:51

## 2023-12-22 RX ADMIN — Medication 75 MICROGRAM(S): at 06:30

## 2023-12-22 RX ADMIN — Medication 100 MILLIGRAM(S): at 06:30

## 2023-12-22 RX ADMIN — Medication 100 MILLIGRAM(S): at 12:50

## 2023-12-22 RX ADMIN — SEVELAMER CARBONATE 800 MILLIGRAM(S): 2400 POWDER, FOR SUSPENSION ORAL at 12:51

## 2023-12-22 RX ADMIN — MUPIROCIN 1 APPLICATION(S): 20 OINTMENT TOPICAL at 06:31

## 2023-12-22 NOTE — PROGRESS NOTE ADULT - ATTENDING COMMENTS
History as noted above.  83 year old woman with ESRD on HD at home, admitted with RSV infection, hyperreactivity and hypercapnea.  She could not tolerate BiPAP.  Has improved with steroids and bronchodilators.  PCO2 is stable in the 50s on recent ABG.  On exam has faint expiratory wheezing in the right lung.  She is afebrile, alert, oriented.  Insistent on going home tonight.  Given stability of ABGs and presence of a nurse and physician at home who care for her, believe she is stable for discharge. She is not requiring oxygen.  Would continue duonebs as needed, prednisone taper as  indicated above.  Will follow up as outpatient with Dr. Do next week.  Agree with plan as outlined above.
ESRD on dialysis  breathing better, on nasal cannula  For dialysis today via AVG  Remainder per fellow, will follow
83F ESRD on HD Mon/Tues/Thurs/Fri, HLD, T2DM, hypothyroidism, presenting with productive cough and SOB for about 1 week, admitted for AHRF requiring BiPAP, likely secondary to RSV with possible superimposed PNA vs. volume overload.    #sepsis 2/2 RSV with superimposed bacterial PNA c/b AHRF  SIRS met w/ leukocytosis and tachypnea iso RSV+ and procal elevation w/ CXR opacity  fever, dyspnea, productive cough, VBG hypercapnia though oxygenating well, placed on BiPAP 10/5, FiO2% 40 for WOB initially.  -Now weaned to nasal cannula saturating well.  -CT with evidence of infectious bronchiolitis which is consistent with RSV infection.  -MRSA negative  -S/P 3 days azithromycin. To complete 5 day course CTX 12/22.   -Sputum cx if able  -Joshua ATC  -Chest PT, Chest vest, Acapella  -Solu-medrol 20mg BID per Pulm recs  -Will trial off BIPAP tonight and obtain early morning ABG to determine need for home NIV per pulm recs    #ESRD on HD   nephro c/s in ED, C/W HD at regular schedule  Na 132, K borderline, proBNP and HST elevated thought contributed to by renal dysfn  - monitor BMP, dose per GFR, avoid nephrotoxins, monitor vol status  - f/u further nephro recs     #troponin elevation   -> 317, presume contributed to by demand of infxn and reduced renal clearance. Trop improved to 30s today further supporting demand ischemia.  w/o CP or palpitations   EKG w/o c/f ACS  - F/U TTE    #macrocytic anemia  unclear bl  - trend CBC daily    #IDDM  home lantus 16u QHS  - C/W lantus and ISS. Will increase dose given rising FSGs on steroids.       PT eval pending    Rest as per Dr. Moscoso above .
83F ESRD on HD Mon/Tues/Thurs/Fri, HLD, T2DM, hypothyroidism, presenting with productive cough and SOB for about 1 week, admitted for AHRF requiring BiPAP, likely secondary to RSV with possible superimposed PNA vs. volume overload.    #sepsis 2/2 RSV with superimposed bacterial PNA c/b AHRF  SIRS met w/ leukocytosis and tachypnea iso RSV+ and procal elevation w/ CXR opacity  fever, dyspnea, productive cough, VBG hypercapnia though oxygenating well, placed on BiPAP 10/5, FiO2% 40 for WOB initially.  -Now weaned to nasal cannula saturating well.  -CT with evidence of infectious bronchiolitis which is consistent with RSV infection.  -MRSA negative  -S/P 5 days ctx, 3 days azithromycin  -Sputum cx if able  -Duonebs ATC  -Chest PT, Chest vest, Acapella  -Solu-medrol 20mg BID per Pulm recs  -ABG improved today. Stable for discharge per pulm. Will be discharged on prednisone taper, duonebs, and pulm followup in 1 week where she will receive further testing to determine need for home NIV.     #ESRD on HD   nephro c/s in ED, C/W HD at regular schedule  Na 132, K borderline, proBNP and HST elevated thought contributed to by renal dysfn  - monitor BMP, dose per GFR, avoid nephrotoxins, monitor vol status  - f/u further nephro recs     #troponin elevation   -> 317, presume contributed to by demand of infxn and reduced renal clearance. Trop improved to 30s today further supporting demand ischemia.  w/o CP or palpitations   EKG w/o c/f ACS  - F/U TTE    #IDDM  -C/W home lantus 16u QHS      Rest as per Dr. Moscoso above .     Pt is stable and clear for discharge. I personally spent 40 minutes on this patient's discharge planning.
83F ESRD on HD Mon/Tues/Thurs/Fri, HLD, T2DM, hypothyroidism, presenting with productive cough and SOB for about 1 week, admitted for AHRF requiring BiPAP, likely secondary to RSV with possible superimposed PNA vs. volume overload.    #sepsis 2/2 RSV with superimposed bacterial PNA c/b AHRF  SIRS met w/ leukocytosis and tachypnea iso RSV+ and procal elevation w/ CXR opacity  fever, dyspnea, productive cough, VBG hypercapnia though oxygenating well, placed on BiPAP 10/5, FiO2% 40 for WOB initially.  -Now weaned to nasal cannula saturating well.  -CT with evidence of infectious bronchiolitis which is consistent with RSV infection.  - c/w CAP tx (CTX, azithro), f/u CX, MRSA, Ustrep/legionella, sputum  -Sputum cx if able  -Duonebs ATC    #ESRD on HD   nephro c/s in ED s/p HD today per usual schedule.   Na 132, K borderline, proBNP and HST elevated thought contributed to by renal dysfn  - monitor BMP, dose per GFR, avoid nephrotoxins, monitor vol status  - f/u further nephro recs     #troponin elevation   -> 317, presume contributed to by demand of infxn and reduced renal clearance  w/o CP or palpitations   EKG w/o c/f ACS  - F/U TTE    #macrocytic anemia  unclear bl  - trend CBC daily    #IDDM  home lantus 16u QHS  - c/w reduced lantus dose w/ ISS      PT eval pending    Rest as per Dr. Moscoso above
83F ESRD on HD Mon/Tues/Thurs/Fri, HLD, T2DM, hypothyroidism, presenting with productive cough and SOB for about 1 week, admitted for AHRF requiring BiPAP, likely secondary to RSV with possible superimposed PNA vs. volume overload.    #sepsis 2/2 RSV with superimposed bacterial PNA c/b AHRF  SIRS met w/ leukocytosis and tachypnea iso RSV+ and procal elevation w/ CXR opacity  fever, dyspnea, productive cough, VBG hypercapnia though oxygenating well, placed on BiPAP 10/5, FiO2% 40 for WOB initially.  -Now weaned to nasal cannula saturating well.  -CT with evidence of infectious bronchiolitis which is consistent with RSV infection.  - c/w CAP tx (CTX, azithro), f/u CX, MRSA, Ustrep/legionella, sputum  -Sputum cx if able  -Duonebs ATC  -Chest PT  -Pt with persistent mild hypercapnia, likely at least partially chronic given elevated bicarb and exacerbated by current episode of bronchiolitis. Suspect pt has element of KERVIN (Obese with short neck and family note pt snores at night). Will trial CPAP tonight and assess response with VBG in AM. Did not use last night     -Will also trial prednisone 40mg daily as pt with wheezing today and overall increased retention on VBG.     #ESRD on HD   nephro c/s in ED, C/W HD at regular schedule  Na 132, K borderline, proBNP and HST elevated thought contributed to by renal dysfn  - monitor BMP, dose per GFR, avoid nephrotoxins, monitor vol status  - f/u further nephro recs     #troponin elevation   -> 317, presume contributed to by demand of infxn and reduced renal clearance. Trop improved to 30s today further supporting demand ischemia.  w/o CP or palpitations   EKG w/o c/f ACS  - F/U TTE    #macrocytic anemia  unclear bl  - trend CBC daily    #IDDM  home lantus 16u QHS  - c/w reduced lantus dose w/ ISS      PT eval pending    Rest as per Dr. Moscoso above .
83F ESRD on HD Mon/Tues/Thurs/Fri, HLD, T2DM, hypothyroidism, presenting with productive cough and SOB for about 1 week, admitted for AHRF requiring BiPAP, likely secondary to RSV with possible superimposed PNA vs. volume overload.    #sepsis 2/2 RSV with superimposed bacterial PNA c/b AHRF  SIRS met w/ leukocytosis and tachypnea iso RSV+ and procal elevation w/ CXR opacity  fever, dyspnea, productive cough, VBG hypercapnia though oxygenating well, placed on BiPAP 10/5, FiO2% 40 for WOB initially.  -Now weaned to nasal cannula saturating well.  -CT with evidence of infectious bronchiolitis which is consistent with RSV infection.  - c/w CAP tx (CTX, azithro), f/u CX, MRSA, Ustrep/legionella, sputum  -Sputum cx if able  -Joshua ATC  -Chest PT  -Pt with persistent mild hypercapnia, likely at least partially chronic given elevated bicarb and exacerbated by current episode of bronchiolitis. Suspect pt has element of KERVIN (Obese with short neck and family note pt snores at night). Will trial CPAP tonight and assess response with VBG in AM    #ESRD on HD   nephro c/s in ED, C/W HD at regular schedule  Na 132, K borderline, proBNP and HST elevated thought contributed to by renal dysfn  - monitor BMP, dose per GFR, avoid nephrotoxins, monitor vol status  - f/u further nephro recs     #troponin elevation   -> 317, presume contributed to by demand of infxn and reduced renal clearance. Trop improved to 30s today further supporting demand ischemia.  w/o CP or palpitations   EKG w/o c/f ACS  - F/U TTE    #macrocytic anemia  unclear bl  - trend CBC daily    #IDDM  home lantus 16u QHS  - c/w reduced lantus dose w/ ISS      PT eval pending    Rest as per Dr. Moscoso above .

## 2023-12-22 NOTE — PROGRESS NOTE ADULT - PROBLEM SELECTOR PLAN 3
Follows with Dr. Solis. Last HD prior to admission was 12/15  Anuric at baseline. Appears mildly volume overloaded however less likely contributing to AHRF  Na 132, K borderline  - trend BMP   - iHD as above  - nephro following; appreciate reccs

## 2023-12-22 NOTE — PROGRESS NOTE ADULT - PROVIDER SPECIALTY LIST ADULT
Pulmonology
Pulmonology
[FreeTextEntry1] : COVID positive in 2022 w/ mild symptoms \par COVID VACCINE FULL.\par \par HPI: Accompanied by sister Marialuisa and presents today for a follow up visit. She initially was seen by Dr. Self this past May 2022  for memory problems that started last year.  She was misplacing things,  forgetting dates, forgetting words, and sometimes forgets conversations. No worsening memory changes.  No paranoia, delusions or hallucinations. \par \par Per sister, there is an element of not paying attention, defines the issue as "attention deficit".\par \par MR done She did not have the neuropsych testing done, \par \par PMH: cataract of the R eye \par \par -Memory:  recent memory impaired. Remote memory remains intact \par -Speech: no issues \par -Orientation: no issues \par -Praxis:no issues \par -Decision making/Executive fx/Multitasking: no issues \par \par -Sleep: sleeps throughout the night. No sleep apnea\par \par -Appetite: eating well. \par \par -Motor symptoms:  no issues \par \par -B/B: no issues \par \par -Psychiatric symptoms: feeling content \par \par -Functional status:\par Vazquez Index of [place] in Activities of Daily Living:\par 1. Bathing/Showerin\par 2. Dressin\par 3. Toiletin\par 4. Transferrin\par 5. Continence: 1\par 6: Feedin\par \par TOTAL: 6\par \par Baudilio-Maximo Instrumental Activities of Daily Living:\par A. Ability to Use Telephone: 1\par B. Shoppin\par C. Food Preparation: 1\par D. Housekeepin\par E. Laundry: 1\par F. Transportation: 1\par G. 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 Been living together for a long time now. No tobacco or alochol use. She plays the [employer], walks and remains engaged throughout the day. \par \par PCP and other physicians:\par -PCP:  Dr. Wanda Saez \par \par Workup done: MRI brain 22.
Nephrology-Cardiorenal
Internal Medicine

## 2023-12-22 NOTE — PROGRESS NOTE ADULT - PROBLEM SELECTOR PLAN 6
Take lantus 16u at lunchtime  - lantus 12u qHS and ISS q6h while NPO   - A1c in AM  - keep -180 while inpatient

## 2023-12-22 NOTE — PROVIDER CONTACT NOTE (MEDICATION) - ACTION/TREATMENT ORDERED:
Per MD Diego Ferguson - azithromycin was to be dc'd on 12/20/23. Ok to hold dose. Itchiness resolved within half hour.

## 2023-12-22 NOTE — PROGRESS NOTE ADULT - SUBJECTIVE AND OBJECTIVE BOX
CHIEF COMPLAINT:Patient is a 83y old  Female who presents with a chief complaint of AHRF (22 Dec 2023 07:40)      Interval Events:    REVIEW OF SYSTEMS:  [x] All other systems negative except per HPI   [ ] Unable to assess ROS because ________    OBJECTIVE:  ICU Vital Signs Last 24 Hrs  T(C): 36.6 (22 Dec 2023 12:31), Max: 37.4 (22 Dec 2023 00:36)  T(F): 97.8 (22 Dec 2023 12:31), Max: 99.4 (22 Dec 2023 00:36)  HR: 72 (22 Dec 2023 12:31) (72 - 94)  BP: 146/71 (22 Dec 2023 12:31) (119/66 - 155/66)  BP(mean): --  ABP: --  ABP(mean): --  RR: 18 (22 Dec 2023 12:31) (18 - 18)  SpO2: 96% (22 Dec 2023 12:31) (93% - 96%)    O2 Parameters below as of 22 Dec 2023 12:31  Patient On (Oxygen Delivery Method): room air                PHYSICAL EXAM:  GENERAL: NAD, well-groomed, well-developed  HEAD:  Atraumatic, Normocephalic  EYES: EOMI, PERRLA, conjunctiva and sclera clear  ENMT: No tonsillar erythema, exudates, or enlargement; Moist mucous membranes, Good dentition, No lesions  NECK: Supple, No JVD, Normal thyroid  CHEST/LUNG: Clear to auscultation bilaterally; No rales, rhonchi, wheezing, or rubs  HEART: Regular rate and rhythm; No murmurs, rubs, or gallops  ABDOMEN: Soft, Nontender, Nondistended; Bowel sounds present  VASCULAR:  2+ Peripheral Pulses, No clubbing, cyanosis, or edema  LYMPH: No lymphadenopathy noted  SKIN: No rashes or lesions  NERVOUS SYSTEM:  Alert & Oriented X3, Good concentration; Motor Strength 5/5 B/L upper and lower extremities; DTRs 2+ intact and symmetric    HOSPITAL MEDICATIONS:  MEDICATIONS  (STANDING):  albuterol/ipratropium for Nebulization 3 milliLiter(s) Nebulizer every 6 hours  atorvastatin 20 milliGRAM(s) Oral at bedtime  benzonatate 100 milliGRAM(s) Oral three times a day  chlorhexidine 2% Cloths 1 Application(s) Topical <User Schedule>  dextrose 5%. 1000 milliLiter(s) (100 mL/Hr) IV Continuous <Continuous>  dextrose 50% Injectable 25 Gram(s) IV Push once  famotidine    Tablet 20 milliGRAM(s) Oral daily  glucagon  Injectable 1 milliGRAM(s) IntraMuscular once  guaiFENesin Oral Liquid (Sugar-Free) 100 milliGRAM(s) Oral every 6 hours  heparin   Injectable 5000 Unit(s) SubCutaneous every 12 hours  influenza  Vaccine (HIGH DOSE) 0.7 milliLiter(s) IntraMuscular once  insulin glargine Injectable (LANTUS) 16 Unit(s) SubCutaneous at bedtime  insulin lispro (ADMELOG) corrective regimen sliding scale   SubCutaneous three times a day before meals  insulin lispro (ADMELOG) corrective regimen sliding scale   SubCutaneous at bedtime  levothyroxine 75 MICROGram(s) Oral daily  methylPREDNISolone sodium succinate Injectable 20 milliGRAM(s) IV Push every 12 hours  mupirocin 2% Nasal 1 Application(s) Both Nostrils two times a day  sevelamer carbonate 800 milliGRAM(s) Oral <User Schedule>  sodium chloride 3%  Inhalation 4 milliLiter(s) Inhalation every 6 hours    MEDICATIONS  (PRN):      LABS:    The Labs were reviewed by me   The Radiology was reviewed by me    EKG tracing reviewed by me    12-22    137  |  97  |  32<H>  ----------------------------<  141<H>  4.2   |  28  |  4.83<H>  12-21    137  |  96  |  23  ----------------------------<  236<H>  4.0   |  31  |  3.59<H>  12-20    134<L>  |  92<L>  |  36<H>  ----------------------------<  147<H>  4.1   |  29  |  5.35<H>    Ca    9.7      22 Dec 2023 07:15  Ca    9.3      21 Dec 2023 10:42  Ca    9.6      20 Dec 2023 06:59  Phos  3.6     12-22  Mg     2.0     12-22    TPro  5.9<L>  /  Alb  3.1<L>  /  TBili  0.2  /  DBili  x   /  AST  16  /  ALT  18  /  AlkPhos  71  12-22  TPro  6.3  /  Alb  3.3  /  TBili  0.2  /  DBili  x   /  AST  14  /  ALT  20  /  AlkPhos  80  12-21  TPro  6.7  /  Alb  3.5  /  TBili  0.2  /  DBili  x   /  AST  22  /  ALT  22  /  AlkPhos  83  12-20    Magnesium: 2.0 mg/dL (12-22-23 @ 07:15)  Magnesium: 2.1 mg/dL (12-21-23 @ 10:42)  Magnesium: 2.2 mg/dL (12-20-23 @ 06:59)    Phosphorus: 3.6 mg/dL (12-22-23 @ 07:15)  Phosphorus: 2.9 mg/dL (12-21-23 @ 10:42)  Phosphorus: 4.8 mg/dL (12-20-23 @ 06:59)                    Urinalysis Basic - ( 22 Dec 2023 07:15 )    Color: x / Appearance: x / SG: x / pH: x  Gluc: 141 mg/dL / Ketone: x  / Bili: x / Urobili: x   Blood: x / Protein: x / Nitrite: x   Leuk Esterase: x / RBC: x / WBC x   Sq Epi: x / Non Sq Epi: x / Bacteria: x      ABG - ( 22 Dec 2023 06:05 )  pH, Arterial: 7.34  pH, Blood: x     /  pCO2: 57    /  pO2: 67    / HCO3: 31    / Base Excess: 4.1   /  SaO2: 97.4                                    9.8    8.80  )-----------( 195      ( 21 Dec 2023 10:42 )             30.1                         10.7   8.04  )-----------( 222      ( 20 Dec 2023 06:59 )             33.3     CAPILLARY BLOOD GLUCOSE      POCT Blood Glucose.: 178 mg/dL (22 Dec 2023 12:09)  POCT Blood Glucose.: 102 mg/dL (22 Dec 2023 08:27)  POCT Blood Glucose.: 209 mg/dL (21 Dec 2023 21:43)  POCT Blood Glucose.: 257 mg/dL (21 Dec 2023 16:34)    Blood Gas Source Venous: Venous (12-21-23 @ 10:40)  Blood Gas Source Venous: Venous (12-20-23 @ 16:54)  Blood Gas Source Venous: Venous (12-20-23 @ 07:08)      MICROBIOLOGY:     RADIOLOGY:  [ ] Reviewed and interpreted by me    Point of Care Ultrasound Findings:    PFT:    EKG: CHIEF COMPLAINT:Patient is a 83y old  Female who presents with a chief complaint of AHRF (22 Dec 2023 07:40)      Interval Events: cough and energy improved  Patient denies fevers, chills, chest pain, shortness of breath, nausea, abdominal pain, diarrhea, constipation, dysuria, leg swelling, headache, light headedness    REVIEW OF SYSTEMS:  [x] All other systems negative except per HPI   [ ] Unable to assess ROS because ________    OBJECTIVE:  ICU Vital Signs Last 24 Hrs  T(C): 36.6 (22 Dec 2023 12:31), Max: 37.4 (22 Dec 2023 00:36)  T(F): 97.8 (22 Dec 2023 12:31), Max: 99.4 (22 Dec 2023 00:36)  HR: 72 (22 Dec 2023 12:31) (72 - 94)  BP: 146/71 (22 Dec 2023 12:31) (119/66 - 155/66)  BP(mean): --  ABP: --  ABP(mean): --  RR: 18 (22 Dec 2023 12:31) (18 - 18)  SpO2: 96% (22 Dec 2023 12:31) (93% - 96%)    O2 Parameters below as of 22 Dec 2023 12:31  Patient On (Oxygen Delivery Method): room air                PHYSICAL EXAM:  GENERAL: NAD, well-groomed, well-developed  HEAD:  Atraumatic, Normocephalic  EYES: EOMI, PERRLA, conjunctiva and sclera clear  ENMT: No tonsillar erythema, exudates, or enlargement; Moist mucous membranes, Good dentition, No lesions  NECK: Supple, No JVD, Normal thyroid  CHEST/LUNG: minimal wheeze and rhonchi  HEART: Regular rate and rhythm; No murmurs, rubs, or gallops  ABDOMEN: Soft, Nontender, Nondistended; Bowel sounds present  VASCULAR:  2+ Peripheral Pulses, No clubbing, cyanosis, or edema  LYMPH: No lymphadenopathy noted  SKIN: No rashes or lesions  NERVOUS SYSTEM:  Alert & Oriented X3, Good concentration; Motor Strength 5/5 B/L upper and lower extremities; DTRs 2+ intact and symmetric    HOSPITAL MEDICATIONS:  MEDICATIONS  (STANDING):  albuterol/ipratropium for Nebulization 3 milliLiter(s) Nebulizer every 6 hours  atorvastatin 20 milliGRAM(s) Oral at bedtime  benzonatate 100 milliGRAM(s) Oral three times a day  chlorhexidine 2% Cloths 1 Application(s) Topical <User Schedule>  dextrose 5%. 1000 milliLiter(s) (100 mL/Hr) IV Continuous <Continuous>  dextrose 50% Injectable 25 Gram(s) IV Push once  famotidine    Tablet 20 milliGRAM(s) Oral daily  glucagon  Injectable 1 milliGRAM(s) IntraMuscular once  guaiFENesin Oral Liquid (Sugar-Free) 100 milliGRAM(s) Oral every 6 hours  heparin   Injectable 5000 Unit(s) SubCutaneous every 12 hours  influenza  Vaccine (HIGH DOSE) 0.7 milliLiter(s) IntraMuscular once  insulin glargine Injectable (LANTUS) 16 Unit(s) SubCutaneous at bedtime  insulin lispro (ADMELOG) corrective regimen sliding scale   SubCutaneous three times a day before meals  insulin lispro (ADMELOG) corrective regimen sliding scale   SubCutaneous at bedtime  levothyroxine 75 MICROGram(s) Oral daily  methylPREDNISolone sodium succinate Injectable 20 milliGRAM(s) IV Push every 12 hours  mupirocin 2% Nasal 1 Application(s) Both Nostrils two times a day  sevelamer carbonate 800 milliGRAM(s) Oral <User Schedule>  sodium chloride 3%  Inhalation 4 milliLiter(s) Inhalation every 6 hours    MEDICATIONS  (PRN):      LABS:    The Labs were reviewed by me   The Radiology was reviewed by me    EKG tracing reviewed by me    12-22    137  |  97  |  32<H>  ----------------------------<  141<H>  4.2   |  28  |  4.83<H>  12-21    137  |  96  |  23  ----------------------------<  236<H>  4.0   |  31  |  3.59<H>  12-20    134<L>  |  92<L>  |  36<H>  ----------------------------<  147<H>  4.1   |  29  |  5.35<H>    Ca    9.7      22 Dec 2023 07:15  Ca    9.3      21 Dec 2023 10:42  Ca    9.6      20 Dec 2023 06:59  Phos  3.6     12-22  Mg     2.0     12-22    TPro  5.9<L>  /  Alb  3.1<L>  /  TBili  0.2  /  DBili  x   /  AST  16  /  ALT  18  /  AlkPhos  71  12-22  TPro  6.3  /  Alb  3.3  /  TBili  0.2  /  DBili  x   /  AST  14  /  ALT  20  /  AlkPhos  80  12-21  TPro  6.7  /  Alb  3.5  /  TBili  0.2  /  DBili  x   /  AST  22  /  ALT  22  /  AlkPhos  83  12-20    Magnesium: 2.0 mg/dL (12-22-23 @ 07:15)  Magnesium: 2.1 mg/dL (12-21-23 @ 10:42)  Magnesium: 2.2 mg/dL (12-20-23 @ 06:59)    Phosphorus: 3.6 mg/dL (12-22-23 @ 07:15)  Phosphorus: 2.9 mg/dL (12-21-23 @ 10:42)  Phosphorus: 4.8 mg/dL (12-20-23 @ 06:59)                    Urinalysis Basic - ( 22 Dec 2023 07:15 )    Color: x / Appearance: x / SG: x / pH: x  Gluc: 141 mg/dL / Ketone: x  / Bili: x / Urobili: x   Blood: x / Protein: x / Nitrite: x   Leuk Esterase: x / RBC: x / WBC x   Sq Epi: x / Non Sq Epi: x / Bacteria: x      ABG - ( 22 Dec 2023 06:05 )  pH, Arterial: 7.34  pH, Blood: x     /  pCO2: 57    /  pO2: 67    / HCO3: 31    / Base Excess: 4.1   /  SaO2: 97.4                                    9.8    8.80  )-----------( 195      ( 21 Dec 2023 10:42 )             30.1                         10.7   8.04  )-----------( 222      ( 20 Dec 2023 06:59 )             33.3     CAPILLARY BLOOD GLUCOSE      POCT Blood Glucose.: 178 mg/dL (22 Dec 2023 12:09)  POCT Blood Glucose.: 102 mg/dL (22 Dec 2023 08:27)  POCT Blood Glucose.: 209 mg/dL (21 Dec 2023 21:43)  POCT Blood Glucose.: 257 mg/dL (21 Dec 2023 16:34)    Blood Gas Source Venous: Venous (12-21-23 @ 10:40)  Blood Gas Source Venous: Venous (12-20-23 @ 16:54)  Blood Gas Source Venous: Venous (12-20-23 @ 07:08)      MICROBIOLOGY:     RADIOLOGY:  [ ] Reviewed and interpreted by me    Point of Care Ultrasound Findings:    PFT:    EKG:

## 2023-12-22 NOTE — CHART NOTE - NSCHARTNOTEFT_GEN_A_CORE
Pt was admitted for acute hypoxic respiratory failure secondary to RSV bronchitis. She was treated with antibiotics and she will require home nebulizer for treatment of this acute bronchitis. Based on her diagnosis of bronchitis (ICD-10 J40), Shawna Daley will require a nebulizer and Duoneb reatments. She was hospitalized with acute respiratory failure affected by her bronchitis and without this would have a high risk of respiratory failure and recurrent hospital admission. Based on her diagnosis of bronchitis (ICD-10 J40), Shawna Daley will require a nebulizer and Duoneb treatments. She was hospitalized with acute respiratory failure affected by her bronchitis and without this would have a high risk of respiratory failure and recurrent hospital admission.

## 2023-12-22 NOTE — PROGRESS NOTE ADULT - ASSESSMENT
83F never smoker with history of ESRD on HD Mon/Tues/Th/Fri, HLD, T2DM, hypothyroidism, presenting with productive cough and SOB for about 1 week, admitted for AHRF requiring BiPAP, likely secondary to RSV with possible superimposed PNA vs. volume overload. Pulm consulted for further recs    #recommendations  patient with multifactorial etiology of respiratory failure both hypoxemic and hypercapnic in nature  please obtain if not already done  full RVP, sputum culture, blood cultures, U/A, urine legionella, nasal MRSA  needs aggressive airway clearance: humidified oxygen, standing guaifenesin, duonebs q6h, hyper sal q6h, chest PT q6 h, aerobika/acapella q6 h, chest vest q12h  ABG borderline however after long discussion patient declining noninvasive workup and will pursue outpatient with Dr. Do if necessary, furthermore has doctors who can monitor patient closely  pred 40 for 3 days, 30 for 2 days, 20 mg until f/u with Dr. Do  albuterol nebulizer machine to go home  acapella to go home  93% RA ambulation    Prior to discharge:  Please email: [email or web address] to setup an appointment prior to discharge. Include the patient's name, , MRN and contact information in the email.    please specify Dr. Do    Pulmonary/Sleep Clinic  [address] [place] [address] [phone]      David Irwin PGY5 PCCM Fellow     83F never smoker with history of ESRD on HD Mon/Tues/Th/Fri, HLD, T2DM, hypothyroidism, presenting with productive cough and SOB for about 1 week, admitted for AHRF requiring BiPAP, likely secondary to RSV with possible superimposed PNA vs. volume overload. Pulm consulted for further recs    #recommendations  patient with multifactorial etiology of respiratory failure both hypoxemic and hypercapnic in nature  please obtain if not already done  full RVP, sputum culture, blood cultures, U/A, urine legionella, nasal MRSA  needs aggressive airway clearance: humidified oxygen, standing guaifenesin, duonebs q6h, hyper sal q6h, chest PT q6 h, aerobika/acapella q6 h, chest vest q12h  ABG borderline however after long discussion patient declining noninvasive workup and will pursue outpatient with Dr. Do if necessary, furthermore has doctors who can monitor patient closely  pred 40 for 3 days, 30 for 2 days, 20 mg until f/u with Dr. Do  albuterol nebulizer machine to go home  acapella to go home  93% RA ambulation    Prior to discharge:  Please email: [email or web address] to setup an appointment prior to discharge. Include the patient's name, , MRN and contact information in the email.    please specify Dr. Do    Pulmonary/Sleep Clinic  [address] [place] [address] [phone]      David Irwin PGY5 PCCM Fellow

## 2023-12-22 NOTE — PROGRESS NOTE ADULT - PROBLEM SELECTOR PLAN 8
- c/w home atorvastatin 20 mg daily

## 2023-12-22 NOTE — DISCHARGE NOTE NURSING/CASE MANAGEMENT/SOCIAL WORK - PATIENT PORTAL LINK FT
You can access the FollowMyHealth Patient Portal offered by [facility] by registering at the following website: [email or web address]. By joining [employer]’s FollowMyHealth portal, you will also be able to view your health information using other applications (apps) compatible with our system. You can access the FollowMyHealth Patient Portal offered by [facility] by registering at the following website: [email or web address]. By joining [employer]’s FollowMyHealth portal, you will also be able to view your health information using other applications (apps) compatible with our system.

## 2023-12-22 NOTE — PROGRESS NOTE ADULT - PROBLEM SELECTOR PLAN 7
- c/w home synthroid 75 mcg daily

## 2023-12-22 NOTE — PROGRESS NOTE ADULT - TIME BILLING
Personal review of data, imaging and discussion with medical team.
REVIEW OF CHART, REVIEW OF MANAGEMENT PLAN WITH THE TEAM AND THE MED ATTENDING

## 2023-12-22 NOTE — PROGRESS NOTE ADULT - PROBLEM SELECTOR PLAN 5
Likely AoCD 2/2 ESRD  Hgb at baseline    - no need for epo at this time  - daily CBC

## 2023-12-22 NOTE — PROGRESS NOTE ADULT - PROBLEM SELECTOR PLAN 2
Likely 2/2  +RSV with possible superimposed PNA with thick secretions, small component of volume overload  Requiring new BiPAP 10/5 40% FiO2 in ED  - transitioned to HFNC 12/18 AM  - on NC 12/19, VBG showing likely chronic co2 retention pt may benefit from BIPAP/ CPAP nightly   - continue to encourage nightly CPAP use, can plan for outpatient sleep study/ PFTs   BNP elevated however more likely falsely elevated iso ESRD, less likely ADHF    - c/w ceftriaxone (12/17-12/21) and azithromycin (12/18-12/20)  - duonebs q6h  - hypertonic saline nebs for mobilization of secretions   - Chest PT/ acapella   - f/u VBG  - plan for iHD 12/18 with 2-3L UF  - f/u TTE Likely 2/2  +RSV with possible superimposed PNA with thick secretions, small component of volume overload  Requiring new BiPAP 10/5 40% FiO2 in ED  - transitioned to HFNC 12/18 AM  - on NC 12/19, VBG showing likely chronic co2 retention pt may benefit from BIPAP/ CPAP nightly   - continue to encourage nightly CPAP use, can plan for outpatient sleep study/ PFTs   BNP elevated however more likely falsely elevated iso ESRD, less likely ADHF    - c/w ceftriaxone (12/17-12/21) and azithromycin (12/18-12/20)  - duonebs q6h  - hypertonic saline nebs for mobilization of secretions   - Chest PT/ acapella   - plan for iHD 12/18 with 2-3L UF  - f/u TTE- wnl   - mentation wnl and ABG improving 12/22- pamela sweet

## 2023-12-22 NOTE — PROVIDER CONTACT NOTE (MEDICATION) - ASSESSMENT
Patient A&Ox4. Patient endorses itching at the RUE IVL. Site assessed, no redness or edema noted. IVL flushed with no pain or discomfort. Excess tape removed and surrounding area cleaned.

## 2023-12-22 NOTE — CHART NOTE - NSCHARTNOTEFT_GEN_A_CORE
Nursing team reported that patient had itchiness in her arm at the site of azithromycin administration. She denied any other systemic issues such as SOB, rash, etc. She has never had a reaction to azithro prior or the last few days when she had it. Upon further review in chart, patient's azithro was to be administered till the 20th but the order was till the 21st. I went ahead and discontinued the azithromycin.

## 2023-12-22 NOTE — PROGRESS NOTE ADULT - PROBLEM SELECTOR PROBLEM 1
ESRD on dialysis
Sepsis due to pneumonia

## 2023-12-22 NOTE — PROGRESS NOTE ADULT - SUBJECTIVE AND OBJECTIVE BOX
PROGRESS NOTE:   Authored by Dr. Kimberlee Moscoso MD (PGY-1). Pager [facility] [phone] / [place] ( [phone]) or via TEAMS    Patient is a 83y old  Female who presents with a chief complaint of AHRF (21 Dec 2023 09:10)      SUBJECTIVE / OVERNIGHT EVENTS:  No acute events overnight.     ADDITIONAL REVIEW OF SYSTEMS:  Patient denies fevers, chills, chest pain, shortness of breath, nausea, abdominal pain, diarrhea, constipation, dysuria, leg swelling, headache, light headedness.    MEDICATIONS  (STANDING):  albuterol/ipratropium for Nebulization 3 milliLiter(s) Nebulizer every 6 hours  atorvastatin 20 milliGRAM(s) Oral at bedtime  benzonatate 100 milliGRAM(s) Oral three times a day  chlorhexidine 2% Cloths 1 Application(s) Topical <User Schedule>  dextrose 5%. 1000 milliLiter(s) (100 mL/Hr) IV Continuous <Continuous>  dextrose 50% Injectable 25 Gram(s) IV Push once  famotidine    Tablet 20 milliGRAM(s) Oral daily  glucagon  Injectable 1 milliGRAM(s) IntraMuscular once  guaiFENesin Oral Liquid (Sugar-Free) 100 milliGRAM(s) Oral every 6 hours  heparin   Injectable 5000 Unit(s) SubCutaneous every 12 hours  influenza  Vaccine (HIGH DOSE) 0.7 milliLiter(s) IntraMuscular once  insulin glargine Injectable (LANTUS) 16 Unit(s) SubCutaneous at bedtime  insulin lispro (ADMELOG) corrective regimen sliding scale   SubCutaneous three times a day before meals  insulin lispro (ADMELOG) corrective regimen sliding scale   SubCutaneous at bedtime  levothyroxine 75 MICROGram(s) Oral daily  methylPREDNISolone sodium succinate Injectable 20 milliGRAM(s) IV Push every 12 hours  mupirocin 2% Nasal 1 Application(s) Both Nostrils two times a day  sevelamer carbonate 800 milliGRAM(s) Oral <User Schedule>  sodium chloride 3%  Inhalation 4 milliLiter(s) Inhalation every 6 hours    MEDICATIONS  (PRN):      CAPILLARY BLOOD GLUCOSE      POCT Blood Glucose.: 209 mg/dL (21 Dec 2023 21:43)  POCT Blood Glucose.: 257 mg/dL (21 Dec 2023 16:34)  POCT Blood Glucose.: 166 mg/dL (21 Dec 2023 08:38)    I&O's Summary      PHYSICAL EXAM:  Vital Signs Last 24 Hrs  T(C): 36.9 (22 Dec 2023 04:38), Max: 37.4 (22 Dec 2023 00:36)  T(F): 98.4 (22 Dec 2023 04:38), Max: 99.4 (22 Dec 2023 00:36)  HR: 86 (22 Dec 2023 06:52) (75 - 101)  BP: 119/76 (22 Dec 2023 04:38) (119/66 - 164/69)  BP(mean): --  RR: 18 (22 Dec 2023 04:38) (18 - 18)  SpO2: 95% (22 Dec 2023 06:52) (91% - 96%)    Parameters below as of 22 Dec 2023 04:38  Patient On (Oxygen Delivery Method): room air        CONSTITUTIONAL: NAD, well-developed  RESPIRATORY: Normal respiratory effort; lungs are clear to auscultation bilaterally  CARDIOVASCULAR: Regular rate and rhythm, normal S1 and S2, no murmur/rub/gallop; No lower extremity edema; Peripheral pulses are 2+ bilaterally  ABDOMEN: Nontender to palpation, normoactive bowel sounds, no rebound/guarding; No hepatosplenomegaly  MSK: no clubbing or cyanosis of digits; no joint swelling or tenderness to palpation  PSYCH: A+O to person, place, and time; affect appropriate    LABS:                        9.8    8.80  )-----------( 195      ( 21 Dec 2023 10:42 )             30.1     12-21    137  |  96  |  23  ----------------------------<  236<H>  4.0   |  31  |  3.59<H>    Ca    9.3      21 Dec 2023 10:42  Phos  2.9     12-21  Mg     2.1     12-21    TPro  6.3  /  Alb  3.3  /  TBili  0.2  /  DBili  x   /  AST  14  /  ALT  20  /  AlkPhos  80  12-21          Urinalysis Basic - ( 21 Dec 2023 10:42 )    Color: x / Appearance: x / SG: x / pH: x  Gluc: 236 mg/dL / Ketone: x  / Bili: x / Urobili: x   Blood: x / Protein: x / Nitrite: x   Leuk Esterase: x / RBC: x / WBC x   Sq Epi: x / Non Sq Epi: x / Bacteria: x          Tele Reviewed:    RADIOLOGY & ADDITIONAL TESTS:  Results Reviewed:   Imaging Personally Reviewed:  Electrocardiogram Personally Reviewed:     PROGRESS NOTE:   Authored by Dr. Kimberlee Moscoso MD (PGY-1). Pager [facility] [phone] / [place] ( [phone]) or via TEAMS    Patient is a 83y old  Female who presents with a chief complaint of AHRF (21 Dec 2023 09:10)      SUBJECTIVE / OVERNIGHT EVENTS:  No acute events overnight.     ADDITIONAL REVIEW OF SYSTEMS:  Patient denies fevers, chills, chest pain, shortness of breath, nausea, abdominal pain, diarrhea, constipation, dysuria, leg swelling, headache, light headedness.    MEDICATIONS  (STANDING):  albuterol/ipratropium for Nebulization 3 milliLiter(s) Nebulizer every 6 hours  atorvastatin 20 milliGRAM(s) Oral at bedtime  benzonatate 100 milliGRAM(s) Oral three times a day  chlorhexidine 2% Cloths 1 Application(s) Topical <User Schedule>  dextrose 5%. 1000 milliLiter(s) (100 mL/Hr) IV Continuous <Continuous>  dextrose 50% Injectable 25 Gram(s) IV Push once  famotidine    Tablet 20 milliGRAM(s) Oral daily  glucagon  Injectable 1 milliGRAM(s) IntraMuscular once  guaiFENesin Oral Liquid (Sugar-Free) 100 milliGRAM(s) Oral every 6 hours  heparin   Injectable 5000 Unit(s) SubCutaneous every 12 hours  influenza  Vaccine (HIGH DOSE) 0.7 milliLiter(s) IntraMuscular once  insulin glargine Injectable (LANTUS) 16 Unit(s) SubCutaneous at bedtime  insulin lispro (ADMELOG) corrective regimen sliding scale   SubCutaneous three times a day before meals  insulin lispro (ADMELOG) corrective regimen sliding scale   SubCutaneous at bedtime  levothyroxine 75 MICROGram(s) Oral daily  methylPREDNISolone sodium succinate Injectable 20 milliGRAM(s) IV Push every 12 hours  mupirocin 2% Nasal 1 Application(s) Both Nostrils two times a day  sevelamer carbonate 800 milliGRAM(s) Oral <User Schedule>  sodium chloride 3%  Inhalation 4 milliLiter(s) Inhalation every 6 hours    MEDICATIONS  (PRN):      CAPILLARY BLOOD GLUCOSE      POCT Blood Glucose.: 209 mg/dL (21 Dec 2023 21:43)  POCT Blood Glucose.: 257 mg/dL (21 Dec 2023 16:34)  POCT Blood Glucose.: 166 mg/dL (21 Dec 2023 08:38)    I&O's Summary      PHYSICAL EXAM:  Vital Signs Last 24 Hrs  T(C): 36.9 (22 Dec 2023 04:38), Max: 37.4 (22 Dec 2023 00:36)  T(F): 98.4 (22 Dec 2023 04:38), Max: 99.4 (22 Dec 2023 00:36)  HR: 86 (22 Dec 2023 06:52) (75 - 101)  BP: 119/76 (22 Dec 2023 04:38) (119/66 - 164/69)  BP(mean): --  RR: 18 (22 Dec 2023 04:38) (18 - 18)  SpO2: 95% (22 Dec 2023 06:52) (91% - 96%)    Parameters below as of 22 Dec 2023 04:38  Patient On (Oxygen Delivery Method): room air        CONSTITUTIONAL: NAD, well-developed  RESPIRATORY: Normal respiratory effort; lungs are clear to auscultation bilaterally  CARDIOVASCULAR: Regular rate and rhythm, normal S1 and S2, no murmur/rub/gallop; No lower extremity edema; Peripheral pulses are 2+ bilaterally  ABDOMEN: Nontender to palpation, normoactive bowel sounds, no rebound/guarding; No hepatosplenomegaly  MSK: no clubbing or cyanosis of digits; no joint swelling or tenderness to palpation  PSYCH: A+O to person, place, and time; affect appropriate    LABS:                        9.8    8.80  )-----------( 195      ( 21 Dec 2023 10:42 )             30.1     12-21    137  |  96  |  23  ----------------------------<  236<H>  4.0   |  31  |  3.59<H>    Ca    9.3      21 Dec 2023 10:42  Phos  2.9     12-21  Mg     2.1     12-21    TPro  6.3  /  Alb  3.3  /  TBili  0.2  /  DBili  x   /  AST  14  /  ALT  20  /  AlkPhos  80  12-21          Urinalysis Basic - ( 21 Dec 2023 10:42 )    Color: x / Appearance: x / SG: x / pH: x  Gluc: 236 mg/dL / Ketone: x  / Bili: x / Urobili: x   Blood: x / Protein: x / Nitrite: x   Leuk Esterase: x / RBC: x / WBC x   Sq Epi: x / Non Sq Epi: x / Bacteria: x          Tele Reviewed:    RADIOLOGY & ADDITIONAL TESTS:  Results Reviewed:   Imaging Personally Reviewed:  Electrocardiogram Personally Reviewed:     PROGRESS NOTE:   Authored by Dr. Kimberlee Moscoso MD (PGY-1). Pager [facility] [phone] / [place] ( [phone]) or via TEAMS    Patient is a 83y old  Female who presents with a chief complaint of AHRF (21 Dec 2023 09:10)      SUBJECTIVE / OVERNIGHT EVENTS:  No acute events overnight. Did not wear mask overnight, ABG this AM  pH 7.34/ PcO2 57. Sitting up on chair ready to go home, wants to do dialysis at home. Denies SOB or CP.       MEDICATIONS  (STANDING):  albuterol/ipratropium for Nebulization 3 milliLiter(s) Nebulizer every 6 hours  atorvastatin 20 milliGRAM(s) Oral at bedtime  benzonatate 100 milliGRAM(s) Oral three times a day  chlorhexidine 2% Cloths 1 Application(s) Topical <User Schedule>  dextrose 5%. 1000 milliLiter(s) (100 mL/Hr) IV Continuous <Continuous>  dextrose 50% Injectable 25 Gram(s) IV Push once  famotidine    Tablet 20 milliGRAM(s) Oral daily  glucagon  Injectable 1 milliGRAM(s) IntraMuscular once  guaiFENesin Oral Liquid (Sugar-Free) 100 milliGRAM(s) Oral every 6 hours  heparin   Injectable 5000 Unit(s) SubCutaneous every 12 hours  influenza  Vaccine (HIGH DOSE) 0.7 milliLiter(s) IntraMuscular once  insulin glargine Injectable (LANTUS) 16 Unit(s) SubCutaneous at bedtime  insulin lispro (ADMELOG) corrective regimen sliding scale   SubCutaneous three times a day before meals  insulin lispro (ADMELOG) corrective regimen sliding scale   SubCutaneous at bedtime  levothyroxine 75 MICROGram(s) Oral daily  methylPREDNISolone sodium succinate Injectable 20 milliGRAM(s) IV Push every 12 hours  mupirocin 2% Nasal 1 Application(s) Both Nostrils two times a day  sevelamer carbonate 800 milliGRAM(s) Oral <User Schedule>  sodium chloride 3%  Inhalation 4 milliLiter(s) Inhalation every 6 hours    MEDICATIONS  (PRN):      CAPILLARY BLOOD GLUCOSE      POCT Blood Glucose.: 209 mg/dL (21 Dec 2023 21:43)  POCT Blood Glucose.: 257 mg/dL (21 Dec 2023 16:34)  POCT Blood Glucose.: 166 mg/dL (21 Dec 2023 08:38)    I&O's Summary      PHYSICAL EXAM:  Vital Signs Last 24 Hrs  T(C): 36.9 (22 Dec 2023 04:38), Max: 37.4 (22 Dec 2023 00:36)  T(F): 98.4 (22 Dec 2023 04:38), Max: 99.4 (22 Dec 2023 00:36)  HR: 86 (22 Dec 2023 06:52) (75 - 101)  BP: 119/76 (22 Dec 2023 04:38) (119/66 - 164/69)  BP(mean): --  RR: 18 (22 Dec 2023 04:38) (18 - 18)  SpO2: 95% (22 Dec 2023 06:52) (91% - 96%)    Parameters below as of 22 Dec 2023 04:38  Patient On (Oxygen Delivery Method): room air        CONSTITUTIONAL: NAD, well-developed  RESPIRATORY: Normal respiratory effort; lungs are clear to auscultation bilaterally  CARDIOVASCULAR: Regular rate and rhythm, normal S1 and S2, no murmur/rub/gallop; No lower extremity edema; Peripheral pulses are 2+ bilaterally  ABDOMEN: Nontender to palpation, normoactive bowel sounds, no rebound/guarding; No hepatosplenomegaly  MSK: no clubbing or cyanosis of digits; no joint swelling or tenderness to palpation  PSYCH: A+O to person, place, and time; affect appropriate    LABS:                        9.8    8.80  )-----------( 195      ( 21 Dec 2023 10:42 )             30.1     12-21    137  |  96  |  23  ----------------------------<  236<H>  4.0   |  31  |  3.59<H>    Ca    9.3      21 Dec 2023 10:42  Phos  2.9     12-21  Mg     2.1     12-21    TPro  6.3  /  Alb  3.3  /  TBili  0.2  /  DBili  x   /  AST  14  /  ALT  20  /  AlkPhos  80  12-21          Urinalysis Basic - ( 21 Dec 2023 10:42 )    Color: x / Appearance: x / SG: x / pH: x  Gluc: 236 mg/dL / Ketone: x  / Bili: x / Urobili: x   Blood: x / Protein: x / Nitrite: x   Leuk Esterase: x / RBC: x / WBC x   Sq Epi: x / Non Sq Epi: x / Bacteria: x          Tele Reviewed:    RADIOLOGY & ADDITIONAL TESTS:  Results Reviewed:   Imaging Personally Reviewed:  Electrocardiogram Personally Reviewed:     PROGRESS NOTE:   Authored by Dr. Kimberlee Moscoso MD (PGY-1). Pager [facility] [phone] / [place] ( [phone]) or via TEAMS    Patient is a 83y old  Female who presents with a chief complaint of AHRF (21 Dec 2023 09:10)      SUBJECTIVE / OVERNIGHT EVENTS:  No acute events overnight. Did not wear mask overnight, ABG this AM  pH 7.34/ PcO2 57. Sitting up on chair ready to go home, wants to do dialysis at home. Denies SOB or CP.       MEDICATIONS  (STANDING):  albuterol/ipratropium for Nebulization 3 milliLiter(s) Nebulizer every 6 hours  atorvastatin 20 milliGRAM(s) Oral at bedtime  benzonatate 100 milliGRAM(s) Oral three times a day  chlorhexidine 2% Cloths 1 Application(s) Topical <User Schedule>  dextrose 5%. 1000 milliLiter(s) (100 mL/Hr) IV Continuous <Continuous>  dextrose 50% Injectable 25 Gram(s) IV Push once  famotidine    Tablet 20 milliGRAM(s) Oral daily  glucagon  Injectable 1 milliGRAM(s) IntraMuscular once  guaiFENesin Oral Liquid (Sugar-Free) 100 milliGRAM(s) Oral every 6 hours  heparin   Injectable 5000 Unit(s) SubCutaneous every 12 hours  influenza  Vaccine (HIGH DOSE) 0.7 milliLiter(s) IntraMuscular once  insulin glargine Injectable (LANTUS) 16 Unit(s) SubCutaneous at bedtime  insulin lispro (ADMELOG) corrective regimen sliding scale   SubCutaneous three times a day before meals  insulin lispro (ADMELOG) corrective regimen sliding scale   SubCutaneous at bedtime  levothyroxine 75 MICROGram(s) Oral daily  methylPREDNISolone sodium succinate Injectable 20 milliGRAM(s) IV Push every 12 hours  mupirocin 2% Nasal 1 Application(s) Both Nostrils two times a day  sevelamer carbonate 800 milliGRAM(s) Oral <User Schedule>  sodium chloride 3%  Inhalation 4 milliLiter(s) Inhalation every 6 hours    MEDICATIONS  (PRN):      CAPILLARY BLOOD GLUCOSE      POCT Blood Glucose.: 209 mg/dL (21 Dec 2023 21:43)  POCT Blood Glucose.: 257 mg/dL (21 Dec 2023 16:34)  POCT Blood Glucose.: 166 mg/dL (21 Dec 2023 08:38)    I&O's Summary      PHYSICAL EXAM:  Vital Signs Last 24 Hrs  T(C): 36.9 (22 Dec 2023 04:38), Max: 37.4 (22 Dec 2023 00:36)  T(F): 98.4 (22 Dec 2023 04:38), Max: 99.4 (22 Dec 2023 00:36)  HR: 86 (22 Dec 2023 06:52) (75 - 101)  BP: 119/76 (22 Dec 2023 04:38) (119/66 - 164/69)  BP(mean): --  RR: 18 (22 Dec 2023 04:38) (18 - 18)  SpO2: 95% (22 Dec 2023 06:52) (91% - 96%)    Parameters below as of 22 Dec 2023 04:38  Patient On (Oxygen Delivery Method): room air        CONSTITUTIONAL: NAD, well-developed  RESPIRATORY: Normal respiratory effort; lungs are clear to auscultation bilaterally  CARDIOVASCULAR: Regular rate and rhythm, normal S1 and S2, no murmur/rub/gallop; No lower extremity edema; Peripheral pulses are 2+ bilaterally  ABDOMEN: Nontender to palpation, normoactive bowel sounds, no rebound/guarding; No hepatosplenomegaly  MSK: no clubbing or cyanosis of digits; no joint swelling or tenderness to palpation  PSYCH: A+O to person, place, and time; affect appropriate    LABS:                        9.8    8.80  )-----------( 195      ( 21 Dec 2023 10:42 )             30.1     12-21    137  |  96  |  23  ----------------------------<  236<H>  4.0   |  31  |  3.59<H>    Ca    9.3      21 Dec 2023 10:42  Phos  2.9     12-21  Mg     2.1     12-21    TPro  6.3  /  Alb  3.3  /  TBili  0.2  /  DBili  x   /  AST  14  /  ALT  20  /  AlkPhos  80  12-21          Urinalysis Basic - ( 21 Dec 2023 10:42 )    Color: x / Appearance: x / SG: x / pH: x  Gluc: 236 mg/dL / Ketone: x  / Bili: x / Urobili: x   Blood: x / Protein: x / Nitrite: x   Leuk Esterase: x / RBC: x / WBC x   Sq Epi: x / Non Sq Epi: x / Bacteria: x          Tele Reviewed:    RADIOLOGY & ADDITIONAL TESTS:  Results Reviewed:   Imaging Personally Reviewed:  Electrocardiogram Personally Reviewed:

## 2023-12-22 NOTE — DISCHARGE NOTE NURSING/CASE MANAGEMENT/SOCIAL WORK - NSDCPEFALRISK_GEN_ALL_CORE
For information on Fall & Injury Prevention, visit: [email or web address] OR  [email or web address] OR  https://www.cdc.gov/steadi/patient.html For information on Fall & Injury Prevention, visit: [email or web address] OR  [email or web address] OR  https://www.cdc.gov/steadi/patient.html

## 2023-12-25 ENCOUNTER — APPOINTMENT (OUTPATIENT)
Dept: PULMONOLOGY | Facility: CLINIC | Age: 83
End: 2023-12-25
Payer: MEDICARE

## 2023-12-25 PROCEDURE — 99496 TRANSJ CARE MGMT HIGH F2F 7D: CPT | Mod: 95

## 2023-12-25 NOTE — ASSESSMENT
[FreeTextEntry1] : RSV infection and bacterial superimposed pna Doing much better Did not require home 02. intoleratnt of bipap but co2 stable Cont pred taper Duoneb OOB, ambulate Plan for in person visit later this week with Dr. Do

## 2023-12-25 NOTE — HISTORY OF PRESENT ILLNESS
[Home] : at home, [unfilled] , at the time of the visit. [Medical Office: ([facility])___] : at the medical office located in  [Friend] : friend [Verbal consent obtained from patient] : the patient, [unfilled] [Discharged with Nursing Homecare] : Discharged with nursing homecare [Pneumonia] : Pneumonia [[place]] : Post-hospitalization from [facility] [Other: _____] : [unfilled] [Yes] : Yes [Admitted on: ___] : The patient was admitted on [unfilled] [Discharged on ___] : discharged on [unfilled] [Discharge Summary] : discharge summary [Pertinent Labs] : pertinent labs [Radiology Findings] : radiology findings [Discharge Med List] : discharge medication list [Med Reconciliation] : medication reconciliation has been completed [Patient Contacted By: ____] : and contacted by [unfilled] [FreeTextEntry2] : post hospital d/c televisit pt's friend present as well  82 y/o woman, esrd on home HD admitted with RSV infection, as well as superimposed bacterial pna, resp failure wiht hypoxia and hypercapnea pt was tx with ceftriaxone/azitro, prednisone, BD bipap tried but pt did not tolerate. clincially improved  Pt feeling much better On prednisone taper duoneb using bid 02 sats , lying down about 92, increase to high 90s upright and deep breaths  Homecare RN came yesterday

## 2023-12-25 NOTE — PHYSICAL EXAM
[No Respiratory Distress] : no respiratory distress  [No Accessory Muscle Use] : no accessory muscle use [de-identified] : lying in bed, no distress.  [Normal] : affect was normal and insight and judgment were intact [24997 - High Complexity requires an extensive number of possible diagnoses and/or the management options, extensive complexity of the medical data (tests, etc.) to be reviewed, and a high risk of significant complications, morbidity, and/or mortality as w] : High Complexity

## 2023-12-26 ENCOUNTER — NON-APPOINTMENT (OUTPATIENT)
Age: 83
End: 2023-12-26

## 2023-12-26 ENCOUNTER — APPOINTMENT (OUTPATIENT)
Dept: OPHTHALMOLOGY | Facility: CLINIC | Age: 83
End: 2023-12-26
Payer: MEDICARE

## 2023-12-26 PROCEDURE — 92012 INTRM OPH EXAM EST PATIENT: CPT

## 2023-12-28 ENCOUNTER — APPOINTMENT (OUTPATIENT)
Dept: PULMONOLOGY | Facility: CLINIC | Age: 83
End: 2023-12-28
Payer: MEDICARE

## 2023-12-28 VITALS
TEMPERATURE: 98.3 F | HEART RATE: 84 BPM | DIASTOLIC BLOOD PRESSURE: 67 MMHG | OXYGEN SATURATION: 95 % | SYSTOLIC BLOOD PRESSURE: 151 MMHG | RESPIRATION RATE: 15 BRPM

## 2023-12-28 VITALS — SYSTOLIC BLOOD PRESSURE: 151 MMHG | HEART RATE: 84 BPM | OXYGEN SATURATION: 95 % | DIASTOLIC BLOOD PRESSURE: 67 MMHG

## 2023-12-28 DIAGNOSIS — J12.1 RESPIRATORY SYNCYTIAL VIRUS PNEUMONIA: ICD-10-CM

## 2023-12-28 PROCEDURE — 94664 DEMO&/EVAL PT USE INHALER: CPT

## 2023-12-28 PROCEDURE — 99215 OFFICE O/P EST HI 40 MIN: CPT | Mod: 25

## 2023-12-28 RX ORDER — TERBINAFINE HCL 1 %
1 CREAM (GRAM) TOPICAL 3 TIMES DAILY
Qty: 1 | Refills: 3 | Status: DISCONTINUED | COMMUNITY
Start: 2023-05-03 | End: 2023-12-28

## 2023-12-28 RX ORDER — TALC/STARCH
POWDER (GRAM) TOPICAL TWICE DAILY
Qty: 1 | Refills: 2 | Status: DISCONTINUED | COMMUNITY
Start: 2023-01-04 | End: 2023-12-28

## 2023-12-28 NOTE — ASSESSMENT
[FreeTextEntry1] : ASSESSMNET PLAN -----  82 y/o woman, esrd on home HD -RECENT ADMISSION FOR RSV  1 hyperreactive airways disease----Taper prednisone down to 10 mg p.o. daily----monitor the Accu-Cheks------- also started on Breztri 1 puff p.o. daily------- patient will also continue the nebulizers up to 4 times daily- --REPEAT CT CHEST IN 4-6 WEESK TIME  2 LABS to be drawn at dialysis 3  ANEMIA--BEING MANAGED BY   NEPHROLOGIST 4 NEEDS DEXA SCAN 5-  CRF--HD AS PER NEHROLOGY  F/U

## 2023-12-28 NOTE — PHYSICAL EXAM
[No Acute Distress] : no acute distress [Normal Oropharynx] : normal oropharynx [III] : Mallampati Class: III [No Neck Mass] : no neck mass [Normal S1, S2] : normal s1, s2 [Kyphosis] : kyphosis [No Clubbing] : no clubbing [Normal Color/ Pigmentation] : normal color/ pigmentation [No Focal Deficits] : no focal deficits [Oriented x3] : oriented x3 [TextBox_68] : B/L CRACKLES  [TextBox_99] : IN WHEEL CHAIR

## 2023-12-28 NOTE — END OF VISIT
[FreeTextEntry3] :   I, Dr. Myesha Do  personally performed the evaluation and management (E/M) services for this established patient who presents today with (a) new problem(s)/exacerbation of (an) existing condition(s). That E/M includes conducting the clinically appropriate interval history &/or exam, assessing all new/exacerbated conditions, and establishing a new plan of care. Today, my SREE, Colleen Flores NP, , was here to observe my evaluation and management service for this new problem/exacerbated condition and follow the plan of care established by me going forward. [Time Spent: ___ minutes] : I have spent [unfilled] minutes of time on the encounter.

## 2023-12-28 NOTE — ADDENDUM
[FreeTextEntry1] : On December 19, 2018, patient had pharmacologic nuclear stress test performed at [facility].\par The stress test was negative for ischemia and showed LVEF > 70% with LVEDV 56 mL.\par \par No further cardiac work-up is necessary at this time prior to consideration for renal transplant. Body Location Override (Optional - Billing Will Still Be Based On Selected Body Map Location If Applicable): left medial frontal scalp Detail Level: Detailed Depth Of Biopsy: dermis Was A Bandage Applied: Yes Size Of Lesion In Cm: 0.8 X Size Of Lesion In Cm: 0 Biopsy Type: H and E Biopsy Method: Dermablade Anesthesia Type: 1% lidocaine with epinephrine Anesthesia Volume In Cc: 0.5 Hemostasis: Drysol Wound Care: Petrolatum Dressing: bandage Destruction After The Procedure: No Type Of Destruction Used: Curettage Curettage Text: The wound bed was treated with curettage after the biopsy was performed. Cryotherapy Text: The wound bed was treated with cryotherapy after the biopsy was performed. Electrodesiccation Text: The wound bed was treated with electrodesiccation after the biopsy was performed. Electrodesiccation And Curettage Text: The wound bed was treated with electrodesiccation and curettage after the biopsy was performed. Silver Nitrate Text: The wound bed was treated with silver nitrate after the biopsy was performed. Lab: [id number] Lab Facility: [id number] Path Notes (To The Dermatopathologist): Size: 0.8\\nR/O: AK vs LM\\nMelan-A stain Consent: Written consent was obtained and risks were reviewed including but not limited to scarring, infection, bleeding, scabbing, incomplete removal, nerve damage and allergy to anesthesia. Post-Care Instructions: I reviewed with the patient in detail post-care instructions. Patient is to keep the biopsy site dry overnight, and then apply bacitracin twice daily until healed. Patient may apply hydrogen peroxide soaks to remove any crusting. Notification Instructions: Patient will be notified of biopsy results. However, patient instructed to call the office if not contacted within 2 weeks. Billing Type: Third-Party Bill Information: Selecting Yes will display possible errors in your note based on the variables you have selected. This validation is only offered as a suggestion for you. PLEASE NOTE THAT THE VALIDATION TEXT WILL BE REMOVED WHEN YOU FINALIZE YOUR NOTE. IF YOU WANT TO FAX A PRELIMINARY NOTE YOU WILL NEED TO TOGGLE THIS TO 'NO' IF YOU DO NOT WANT IT IN YOUR FAXED NOTE. Body Location Override (Optional - Billing Will Still Be Based On Selected Body Map Location If Applicable): left superior helix Size Of Lesion In Cm: 1.2 Path Notes (To The Dermatopathologist): Size: 1.2 cm \\nR/O: melanoma\\nMelan-A stain

## 2023-12-28 NOTE — DISCUSSION/SUMMARY
[FreeTextEntry1] : ---Assessment plan----------The patient has been referred here for further opinion regarding pulmonary problem,  82 y/o woman, esrd on home HD--admitted with RSV infection Thanks for allowing  me to participate  in the care of this patient.  Patient at this time  will follow  the above mentioned recommendations and return back for follow up visit. If you have any questions  I can be reached  at # [phone] (office).  Myesha Do MD, FCCP  Pulmonary, Critical Care and Sleep Medicine

## 2023-12-28 NOTE — REVIEW OF SYSTEMS
[Fever] : no fever [Dry Eyes] : no dry eyes [Cough] : cough [Chest Discomfort] : no chest discomfort [Hay Fever] : no hay fever [GERD] : no gerd [Raynaud] : no raynaud [Thyroid Problem] : thyroid problem [TextBox_94] : H/O ARTHRITIS [TextBox_144] : H/O HYPOTHYROIDISM

## 2023-12-28 NOTE — HISTORY OF PRESENT ILLNESS
[Never] : never [TextBox_4] : This letter  is regarding your patient  who  attended pulmonary out patient office today.  I have reviewed  patient's  past history, social history, family history and medication list. I also  reviewed nurse practitioners/ and fellows  notes and assessment and agree with it.   The patient was referred by [facility]  84 y/o woman, esrd on home HD admitted with RSV infection IN [facility]   dec 2023----, as well as superimposed bacterial pna, resp failure wiht hypoxia and hypercapnea pt was tx with ceftriaxone/azitro, prednisone, BD--AT PRESNET IN TAPERING DOSES OF PREDNISONE-- bipap tried but pt did not tolerate. clincially improved  Pt feeling much better On prednisone taper duoneb using bid    ------No history of , fever, chills , rigors, chest pain, or hemoptysis. Questionable history of Raynaud's phenomenon. No h/o significant weight loss in last few months. No history of liver dysfunction , collagen vascular disorder or chronic thromboembolic disease. I would classify the patient's dyspnea as WHO  FUNCTIONAL CLASS II--------  ----Echo  date----12/2023 CONCLUSIONS:   1. Left ventricular cavity is normal. Left ventricular systolic function is low normal with an ejection fraction of 51 % by Villagomez's method of disks.  2. The left ventricular diastolic function is indeterminate.  3. Normal right ventricular cavity size, wall thickness, and systolic function.  4. The left atrium is moderately dilated.  5. The right atrium is mildly dilated in size.  6. There is mild mitral regurgitation. The mitral regurgitant jet is eccentrically directed.  7. There is trace tricuspid regurgitation. There is insufficient tricuspid regurgitation detected to calculate pulmonary artery systolic pressure.  8. No pericardial effusion seen.  9. No prior echocardiogram is available for comparison.-- ----PFT date---------N/A ----Ct scan date-----12/2023 IMPRESSION: Small airways process involving both lungs compatible with infectious  bronchiolitis.--

## 2024-01-12 ENCOUNTER — APPOINTMENT (OUTPATIENT)
Dept: RADIOLOGY | Facility: CLINIC | Age: 84
End: 2024-01-12
Payer: MEDICARE

## 2024-01-12 PROCEDURE — 77080 DXA BONE DENSITY AXIAL: CPT

## 2024-01-17 ENCOUNTER — APPOINTMENT (OUTPATIENT)
Dept: OTOLARYNGOLOGY | Facility: CLINIC | Age: 84
End: 2024-01-17

## 2024-01-24 ENCOUNTER — APPOINTMENT (OUTPATIENT)
Dept: CT IMAGING | Facility: CLINIC | Age: 84
End: 2024-01-24
Payer: MEDICARE

## 2024-01-24 ENCOUNTER — OUTPATIENT (OUTPATIENT)
Dept: OUTPATIENT SERVICES | Facility: HOSPITAL | Age: 84
LOS: 1 days | End: 2024-01-24
Payer: MEDICARE

## 2024-01-24 DIAGNOSIS — Z98.890 OTHER SPECIFIED POSTPROCEDURAL STATES: Chronic | ICD-10-CM

## 2024-01-24 DIAGNOSIS — J12.1 RESPIRATORY SYNCYTIAL VIRUS PNEUMONIA: ICD-10-CM

## 2024-01-24 DIAGNOSIS — Z90.10 ACQUIRED ABSENCE OF UNSPECIFIED BREAST AND NIPPLE: Chronic | ICD-10-CM

## 2024-01-24 PROCEDURE — 71250 CT THORAX DX C-: CPT | Mod: 26,MH

## 2024-01-24 PROCEDURE — 71250 CT THORAX DX C-: CPT

## 2024-01-30 ENCOUNTER — NON-APPOINTMENT (OUTPATIENT)
Age: 84
End: 2024-01-30

## 2024-01-30 ENCOUNTER — APPOINTMENT (OUTPATIENT)
Dept: CARDIOLOGY | Facility: CLINIC | Age: 84
End: 2024-01-30
Payer: MEDICARE

## 2024-01-30 VITALS
DIASTOLIC BLOOD PRESSURE: 75 MMHG | WEIGHT: 214.95 LBS | BODY MASS INDEX: 39.56 KG/M2 | HEIGHT: 62 IN | SYSTOLIC BLOOD PRESSURE: 144 MMHG | HEART RATE: 105 BPM | OXYGEN SATURATION: 99 %

## 2024-01-30 DIAGNOSIS — N18.5 CHRONIC KIDNEY DISEASE, STAGE 5: ICD-10-CM

## 2024-01-30 PROCEDURE — 93000 ELECTROCARDIOGRAM COMPLETE: CPT

## 2024-01-30 PROCEDURE — 99203 OFFICE O/P NEW LOW 30 MIN: CPT

## 2024-01-30 RX ORDER — SEVELAMER CARBONATE 800 MG/1
800 TABLET, FILM COATED ORAL
Refills: 0 | Status: DISCONTINUED | COMMUNITY
End: 2024-01-30

## 2024-01-30 RX ORDER — PREDNISONE 5 MG/1
5 TABLET ORAL DAILY
Qty: 60 | Refills: 1 | Status: DISCONTINUED | COMMUNITY
Start: 2023-12-28 | End: 2024-01-30

## 2024-01-30 RX ORDER — INSULIN GLARGINE 100 [IU]/ML
INJECTION, SOLUTION SUBCUTANEOUS
Refills: 0 | Status: DISCONTINUED | COMMUNITY
End: 2024-01-30

## 2024-01-30 NOTE — PHYSICAL EXAM
[Right Foot Was Examined] : Right foot ~C was examined [Left Foot Was Examined] : left foot ~C was examined [None] : no ulcers in either foot were found [] : normal [de-identified] : good ROM of b/l shoulder.  c/o tenderness over trapezius, upper scapula, upper arm [TextEntry] : Constitutional: no acute distress, well nourished, well developed and well-appearing. Pulmonary: no respiratory distress, lungs were clear to auscultation bilaterally, no accessory muscle use. Cardiac: normal rate, with a regular rhythm, normal S1 and S2 and no murmur heard.  Vascular: there was no peripheral edema. Abdomen: abdomen soft, non-tender, non-distended, no abdominal mass palpated and normal bowel sounds. Psychiatric: the affect was normal and insight and judgement were intact

## 2024-01-30 NOTE — PLAN
[FreeTextEntry1] : 1.  limited mobility with several risk factors including diabetes and esrd.  Will get a pharm stress test.

## 2024-01-30 NOTE — HISTORY OF PRESENT ILLNESS
[FreeTextEntry1] : She is a 83 year old female with a history of breast ca, hypothyroidism, dm , htn, hyperlipidemia who is on dialysis 4 times a week.  She  uses a walker at home.  She had a stress test in 2018 which was non-ischemic.  She does not walk due to knee issues but does use a walker from time to time.  She is scheduled to go to [facility] 2/9/24

## 2024-01-31 NOTE — DISCUSSION/SUMMARY
[FreeTextEntry1] : ---Assessment plan----------The patient has been referred here for further opinion regarding pulmonary problem,  82 y/o woman, esrd on home HD--admitted with RSV infection Thanks for allowing  me to participate  in the care of this patient.  Patient at this time  will follow  the above mentioned recommendations and return back for follow up visit. If you have any questions  I can be reached  at # [phone] (office).  Myesha oD MD, FCCP  Pulmonary, Critical Care and Sleep Medicine

## 2024-01-31 NOTE — HISTORY OF PRESENT ILLNESS
[Never] : never [TextBox_4] : This letter  is regarding your patient  who  attended pulmonary out patient office today.  I have reviewed  patient's  past history, social history, family history and medication list. I also  reviewed nurse practitioners/ and fellows  notes and assessment and agree with it.   The patient was referred by [facility]  84 y/o woman, esrd on home HD admitted with RSV infection IN [facility]   dec 2023----, as well as superimposed bacterial pna, resp failure wiht hypoxia and hypercapnea pt was tx with ceftriaxone/azitro, prednisone, BD--AT PRESNET IN TAPERING DOSES OF PREDNISONE-- bipap tried but pt did not tolerate. clincially improved  Pt feeling much better On prednisone taper duoneb using bid    ------No history of , fever, chills , rigors, chest pain, or hemoptysis. Questionable history of Raynaud's phenomenon. No h/o significant weight loss in last few months. No history of liver dysfunction , collagen vascular disorder or chronic thromboembolic disease. I would classify the patient's dyspnea as WHO  FUNCTIONAL CLASS II--------  ----Echo  date----12/2023 CONCLUSIONS:   1. Left ventricular cavity is normal. Left ventricular systolic function is low normal with an ejection fraction of 51 % by Villagomez's method of disks.  2. The left ventricular diastolic function is indeterminate.  3. Normal right ventricular cavity size, wall thickness, and systolic function.  4. The left atrium is moderately dilated.  5. The right atrium is mildly dilated in size.  6. There is mild mitral regurgitation. The mitral regurgitant jet is eccentrically directed.  7. There is trace tricuspid regurgitation. There is insufficient tricuspid regurgitation detected to calculate pulmonary artery systolic pressure.  8. No pericardial effusion seen.  9. No prior echocardiogram is available for comparison.-- ----PFT date---------N/A ----Ct scan date-----12/2023 IMPRESSION: Small airways process involving both lungs compatible with infectious  bronchiolitis.--

## 2024-02-02 ENCOUNTER — APPOINTMENT (OUTPATIENT)
Dept: PULMONOLOGY | Facility: CLINIC | Age: 84
End: 2024-02-02

## 2024-03-20 NOTE — PHYSICAL THERAPY INITIAL EVALUATION ADULT - PLANNED THERAPY INTERVENTIONS, PT EVAL
, GOAL: Pt will ascend/descend (3) steps (I) with U HR and step over step pattern in 4 weeks./balance training/bed mobility training/gait training/strengthening/transfer training

## 2024-05-14 ENCOUNTER — APPOINTMENT (OUTPATIENT)
Dept: OPHTHALMOLOGY | Facility: CLINIC | Age: 84
End: 2024-05-14
Payer: MEDICARE

## 2024-05-14 ENCOUNTER — NON-APPOINTMENT (OUTPATIENT)
Age: 84
End: 2024-05-14

## 2024-05-14 PROCEDURE — 92012 INTRM OPH EXAM EST PATIENT: CPT

## 2024-05-31 ENCOUNTER — RX RENEWAL (OUTPATIENT)
Age: 84
End: 2024-05-31

## 2024-06-07 ENCOUNTER — RX RENEWAL (OUTPATIENT)
Age: 84
End: 2024-06-07

## 2024-06-10 ENCOUNTER — NON-APPOINTMENT (OUTPATIENT)
Age: 84
End: 2024-06-10

## 2024-06-10 ENCOUNTER — APPOINTMENT (OUTPATIENT)
Dept: OPHTHALMOLOGY | Facility: CLINIC | Age: 84
End: 2024-06-10
Payer: MEDICARE

## 2024-06-10 PROCEDURE — 65436 CURETTE/TREAT CORNEA: CPT | Mod: LT

## 2024-06-12 ENCOUNTER — APPOINTMENT (OUTPATIENT)
Dept: INTERNAL MEDICINE | Facility: CLINIC | Age: 84
End: 2024-06-12
Payer: MEDICARE

## 2024-06-12 VITALS
HEIGHT: 62 IN | SYSTOLIC BLOOD PRESSURE: 136 MMHG | HEART RATE: 73 BPM | WEIGHT: 217.15 LBS | BODY MASS INDEX: 39.96 KG/M2 | DIASTOLIC BLOOD PRESSURE: 68 MMHG | OXYGEN SATURATION: 96 % | TEMPERATURE: 98 F

## 2024-06-12 DIAGNOSIS — E11.3293 TYPE 2 DIABETES MELLITUS WITH MILD NONPROLIFERATIVE DIABETIC RETINOPATHY WITHOUT MACULAR EDEMA, BILATERAL: ICD-10-CM

## 2024-06-12 DIAGNOSIS — J98.4 EMPHYSEMA, UNSPECIFIED: ICD-10-CM

## 2024-06-12 DIAGNOSIS — N25.81 SECONDARY HYPERPARATHYROIDISM OF RENAL ORIGIN: ICD-10-CM

## 2024-06-12 DIAGNOSIS — E78.00 PURE HYPERCHOLESTEROLEMIA, UNSPECIFIED: ICD-10-CM

## 2024-06-12 DIAGNOSIS — N18.5 CHRONIC KIDNEY DISEASE, STAGE 5: ICD-10-CM

## 2024-06-12 DIAGNOSIS — C50.911 MALIGNANT NEOPLASM OF UNSPECIFIED SITE OF RIGHT FEMALE BREAST: ICD-10-CM

## 2024-06-12 DIAGNOSIS — Z00.00 ENCOUNTER FOR GENERAL ADULT MEDICAL EXAMINATION W/OUT ABNORMAL FINDINGS: ICD-10-CM

## 2024-06-12 DIAGNOSIS — D63.1 CHRONIC KIDNEY DISEASE, STAGE 5: ICD-10-CM

## 2024-06-12 DIAGNOSIS — Z79.4 TYPE 2 DIABETES MELLITUS WITH MILD NONPROLIFERATIVE DIABETIC RETINOPATHY WITHOUT MACULAR EDEMA, BILATERAL: ICD-10-CM

## 2024-06-12 DIAGNOSIS — I12.9 HYPERTENSIVE CHRONIC KIDNEY DISEASE WITH STAGE 1 THROUGH STAGE 4 CHRONIC KIDNEY DISEASE, OR UNSPECIFIED CHRONIC KIDNEY DISEASE: ICD-10-CM

## 2024-06-12 DIAGNOSIS — J43.9 EMPHYSEMA, UNSPECIFIED: ICD-10-CM

## 2024-06-12 PROCEDURE — G2211 COMPLEX E/M VISIT ADD ON: CPT | Mod: NC

## 2024-06-12 PROCEDURE — G0439: CPT

## 2024-06-12 PROCEDURE — 99214 OFFICE O/P EST MOD 30 MIN: CPT | Mod: 24

## 2024-06-12 RX ORDER — ALCOHOL ANTISEPTIC PADS
PADS, MEDICATED (EA) TOPICAL
Qty: 100 | Refills: 11 | Status: ACTIVE | COMMUNITY
Start: 2023-07-25 | End: 1900-01-01

## 2024-06-12 RX ORDER — PEN NEEDLE, DIABETIC 29 G X1/2"
32G X 4 MM NEEDLE, DISPOSABLE MISCELLANEOUS
Qty: 100 | Refills: 11 | Status: ACTIVE | COMMUNITY
Start: 2023-01-31 | End: 1900-01-01

## 2024-06-12 RX ORDER — ATORVASTATIN CALCIUM 20 MG/1
20 TABLET, FILM COATED ORAL
Qty: 1 | Refills: 3 | Status: ACTIVE | COMMUNITY
Start: 1900-01-01 | End: 1900-01-01

## 2024-06-12 RX ORDER — INSULIN GLARGINE 100 [IU]/ML
100 INJECTION, SOLUTION SUBCUTANEOUS
Qty: 3 | Refills: 3 | Status: ACTIVE | COMMUNITY
Start: 1900-01-01 | End: 1900-01-01

## 2024-06-12 NOTE — HEALTH RISK ASSESSMENT
[Good] : ~his/her~  mood as  good [Never (0 pts)] : Never (0 points) [No] : In the past 12 months have you used drugs other than those required for medical reasons? No [0] : 2) Feeling down, depressed, or hopeless: Not at all (0) [PHQ-2 Negative - No further assessment needed] : PHQ-2 Negative - No further assessment needed [HIV test declined] : HIV test declined [Hepatitis C test declined] : Hepatitis C test declined [Single] : single [Fully functional (bathing, dressing, toileting, transferring, walking, feeding)] : Fully functional (bathing, dressing, toileting, transferring, walking, feeding) [Independent] : managing finances [Some assistance needed] : managing medications [Full assistance needed] : using transportation [Smoke Detector] : smoke detector [Carbon Monoxide Detector] : carbon monoxide detector [Seat Belt] :  uses seat belt [With Patient/Caregiver] : , with patient/caregiver [Designated Healthcare Proxy] : Designated healthcare proxy [Name: ___] : Health Care Proxy's Name: [unfilled]  [Relationship: ___] : Relationship: [unfilled] [One fall no injury in past year] : Patient reported one fall in the past year without injury [Never] : Never [NO] : No [Patient reported mammogram was normal] : Patient reported mammogram was normal [None] : None [Retired] : retired [Feels Safe at Home] : Feels safe at home [Audit-CScore] : 0 [de-identified] : March 2024 [[id number]] : 0 [Change in mental status noted] : No change in mental status noted [Reports changes in hearing] : Reports no changes in hearing [MammogramDate] : 10/24 [MammogramComments] : doing it every 6 mo- being f/u by GYN- Dr. Kaiden Mascorro- seen as per pt.  breast exam was done by Oncology [BoneDensityDate] : 11/23 [BoneDensityComments] : osteopenia [ColonoscopyComments] : pt to release copy of colonosocpy.  pt refused any further colon Ca screening [de-identified] : lives with [facility] [FreeTextEntry8] : w walker [FreeTextEntry7] : med is filled [de-identified] : reviewed6/10/24 Ophth notes- Kathy, ischemic optic neuropathy [de-identified] : seen dentist in 1 mo [AdvancecareDate] : 06/24

## 2024-06-12 NOTE — PLAN
[FreeTextEntry1] : pt to do fasting lipid panel w next dialysis labs DM- pt to ck 2 hr post prandial glucose. Breast CA, ESRD, DM- Stable. Continue establish treatment plan.

## 2024-06-12 NOTE — HISTORY OF PRESENT ILLNESS
[FreeTextEntry1] : CPE [de-identified] : vaccine- rec shingrix, tdap, prevnar 20 vac diet- limited exercise- walks w walker at home alone hx of breast Ca- being f/u by ONcology.  had [place] surg at [facility] [place]/o b/l shoulder pain- chronic but worse over past few mo but has good ROM . pt thinks it is due to laying down 4 hr for dialysis.  uses a walker at home exercise- stationary bike breast CA- f/u w Oncologist- has f/u- did mammo in April hypothyroid- compliant w meds DM-states had labs 8/3/23-reviewed on pt's phone - A1c 6.8.  I didn't see BUN/ Cr.  pt will send me the results, .  decr sugar in coffee fasting labs 100-115. doesn't ck post prandial glucose .  reviewed 4/14/23 labs from dialysis.  home -130/50-60 chol- compliant w diet. taking statin every other day ESRD on dialysis- reviewed 7/18/18 Nephrology notes, 2/20/19 Surg notes- for Renal transplant evaluation reviewed 6/5/24 labs from dialysis- Cr 6.01, hgb 10.8, .  reviewed 4/23/24 A1c 6.0, TSH 4.37, free T4 1.2 reviewed1/30/24 Cardio notes- rec Nuclear stress test- reviewed 12/19/23 ECHO- mod dilated LA, mild dil RA, mild MR reviewed 1/24/24 CT chest- clear.  reviewed 2/2/24 Pulm notes- hyperactive airway dis.  pt was admitted 12/17/23 for Pneumonia, RSV end March- fell in [place]- went to [facility]- had MRI c , t , L  back, DJD arthropathy, cer disc plaques, abutment of C6 nerve root, impingement of nerves- L spine nerves, S1.  reviewed 3/31/24 MRI brain- chronic sm vessel ischemic chg, hemosiderin deposits.,  cerebral, cerebellar atrophy, minimal plaque- CCA, carotid bulbs. , vertebral art. chronic HA

## 2024-06-12 NOTE — PHYSICAL EXAM
[No Acute Distress] : no acute distress [Well Nourished] : well nourished [Well Developed] : well developed [Well-Appearing] : well-appearing [Normal Sclera/Conjunctiva] : normal sclera/conjunctiva [PERRL] : pupils equal round and reactive to light [Normal Outer Ear/Nose] : the outer ears and nose were normal in appearance [Normal Oropharynx] : the oropharynx was normal [Normal TMs] : both tympanic membranes were normal [No Lymphadenopathy] : no lymphadenopathy [Supple] : supple [Thyroid Normal, No Nodules] : the thyroid was normal and there were no nodules present [No Respiratory Distress] : no respiratory distress  [No Accessory Muscle Use] : no accessory muscle use [Clear to Auscultation] : lungs were clear to auscultation bilaterally [Normal Rate] : normal rate  [Regular Rhythm] : with a regular rhythm [Normal S1, S2] : normal S1 and S2 [No Murmur] : no murmur heard [No Carotid Bruits] : no carotid bruits [Pedal Pulses Present] : the pedal pulses are present [No Edema] : there was no peripheral edema [Soft] : abdomen soft [Non Tender] : non-tender [Non-distended] : non-distended [No Masses] : no abdominal mass palpated [Normal Bowel Sounds] : normal bowel sounds [Normal Supraclavicular Nodes] : no supraclavicular lymphadenopathy [Normal Axillary Nodes] : no axillary lymphadenopathy [Normal Posterior Cervical Nodes] : no posterior cervical lymphadenopathy [Normal Anterior Cervical Nodes] : no anterior cervical lymphadenopathy [Normal Inguinal Nodes] : no inguinal lymphadenopathy [Grossly Normal Strength/Tone] : grossly normal strength/tone [No Focal Deficits] : no focal deficits [Normal Gait] : normal gait [Normal Affect] : the affect was normal [Normal Insight/Judgement] : insight and judgment were intact [None] : no ulcers in either foot were found [] : normal

## 2024-06-14 ENCOUNTER — NON-APPOINTMENT (OUTPATIENT)
Age: 84
End: 2024-06-14

## 2024-06-14 ENCOUNTER — APPOINTMENT (OUTPATIENT)
Dept: OPHTHALMOLOGY | Facility: CLINIC | Age: 84
End: 2024-06-14
Payer: MEDICARE

## 2024-06-14 PROCEDURE — 99024 POSTOP FOLLOW-UP VISIT: CPT

## 2024-06-16 ENCOUNTER — RX RENEWAL (OUTPATIENT)
Age: 84
End: 2024-06-16

## 2024-06-19 ENCOUNTER — APPOINTMENT (OUTPATIENT)
Dept: INFECTIOUS DISEASE | Facility: CLINIC | Age: 84
End: 2024-06-19
Payer: MEDICARE

## 2024-06-19 VITALS
DIASTOLIC BLOOD PRESSURE: 59 MMHG | HEIGHT: 62 IN | WEIGHT: 216 LBS | HEART RATE: 88 BPM | OXYGEN SATURATION: 97 % | SYSTOLIC BLOOD PRESSURE: 131 MMHG | BODY MASS INDEX: 39.75 KG/M2 | TEMPERATURE: 97.6 F

## 2024-06-19 DIAGNOSIS — Z99.2 END STAGE RENAL DISEASE: ICD-10-CM

## 2024-06-19 DIAGNOSIS — R76.12 NONSPECIFIC REACTION TO CELL MEDIATED IMMUNITY MEASUREMENT OF GAMMA INTERFERON ANTIGEN RESPONSE W/OUT ACTIVE TUBERCULOSIS: ICD-10-CM

## 2024-06-19 DIAGNOSIS — N18.6 END STAGE RENAL DISEASE: ICD-10-CM

## 2024-06-19 PROCEDURE — 99204 OFFICE O/P NEW MOD 45 MIN: CPT

## 2024-06-19 RX ORDER — B COMPLEX, C NO.20/FOLIC ACID 1 MG
1 CAPSULE ORAL
Qty: 90 | Refills: 3 | Status: ACTIVE | COMMUNITY
Start: 1900-01-01 | End: 1900-01-01

## 2024-06-19 RX ORDER — SEVELAMER CARBONATE 800 MG/1
800 TABLET, FILM COATED ORAL
Qty: 270 | Refills: 3 | Status: DISCONTINUED | COMMUNITY
Start: 2017-11-14 | End: 2024-06-19

## 2024-06-19 RX ORDER — CALCITRIOL 0.5 UG/1
0.5 CAPSULE, LIQUID FILLED ORAL
Qty: 90 | Refills: 3 | Status: ACTIVE | COMMUNITY
Start: 2017-12-22 | End: 1900-01-01

## 2024-06-19 RX ORDER — BUDESONIDE, GLYCOPYRROLATE, AND FORMOTEROL FUMARATE 160; 9; 4.8 UG/1; UG/1; UG/1
160-9-4.8 AEROSOL, METERED RESPIRATORY (INHALATION)
Qty: 1 | Refills: 0 | Status: DISCONTINUED | COMMUNITY
Start: 2023-12-28 | End: 2024-06-19

## 2024-06-19 RX ORDER — PATIROMER 8.4 G/1
8.4 POWDER, FOR SUSPENSION ORAL
Qty: 30 | Refills: 11 | Status: ACTIVE | COMMUNITY
Start: 2021-03-16 | End: 1900-01-01

## 2024-06-19 RX ORDER — IPRATROPIUM BROMIDE AND ALBUTEROL SULFATE 2.5; .5 MG/3ML; MG/3ML
0.5-2.5 (3) SOLUTION RESPIRATORY (INHALATION) 4 TIMES DAILY
Qty: 120 | Refills: 3 | Status: DISCONTINUED | COMMUNITY
Start: 2023-12-28 | End: 2024-06-19

## 2024-06-19 RX ORDER — SUCROFERRIC OXYHYDROXIDE 500 MG/1
500 TABLET, CHEWABLE ORAL
Qty: 360 | Refills: 3 | Status: ACTIVE | COMMUNITY
Start: 2024-06-19 | End: 1900-01-01

## 2024-06-19 NOTE — REVIEW OF SYSTEMS
[Normal Appetite] : normal appetite [Negative] : Heme/Lymph [Fever] : no fever [Chills] : no chills [Recent Weight Loss (___ Lbs)] : no recent weight loss [FreeTextEntry9] : uses wheelchair

## 2024-06-19 NOTE — CONSULT LETTER
[Dear  ___] : Dear  [unfilled], [Courtesy Letter:] : I had the pleasure of seeing your patient, [unfilled], in my office today. [Please see my note below.] : Please see my note below. [Consult Closing:] : Thank you very much for allowing me to participate in the care of this patient.  If you have any questions, please do not hesitate to contact me. [Sincerely,] : Sincerely, [FreeTextEntry3] : Lia Tomas MD Division [place] [address] phone: [phone] fax: [phone] [FreeTextEntry2] : Hoa Solis

## 2024-06-19 NOTE — HISTORY OF PRESENT ILLNESS
[FreeTextEntry1] : 84F with remote history of right breast cancer, hypothyroidism, DMT2, HTN, chol currently on HD via LUE AVG since 2015.  She says she had b/l knee replacements.  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  At the end, she developed ESRD and is now on HE.   In [id number], she underwent evaluation for possible renal transplant.  At that time, she had Quantiferon gold sent which was positive.  She is referred here at this time to address next steps.  No fever/chills.  No cough / hemoptysis.  No SOB.  She had recent CT chest after RSV infection which was clear.  No weight loss.  ROS Otherwise negative.  Born in [place] (area [place] now [place]  BCG vaccinated.  Not a smoker.

## 2024-06-19 NOTE — ASSESSMENT
[FreeTextEntry1] : 84F with remote history of right breast cancer, hypothyroidism, DMT2, HTN, chol currently on HD via LUE AVG since 2015.  She says she had b/l knee replacements.  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  At the end, she developed ESRD and is now on HD.  Here for evaluation for LTBI.  She remains asymptomatic with negative imaging.  Using the LTBI calculator, her likelihood of developing active disease is very low (about 2% over her lifetime) and the risk of adverse effects in treating LTBI is higher.  At this point, I would monitor for symptoms.  This was explained to her and her fellow colleagues.  She can f/u with me if there is any change in her medical or clinical status.  All questions answered.

## 2024-06-19 NOTE — PHYSICAL EXAM
[General Appearance - In No Acute Distress] : in no acute distress [Sclera] : the sclera and conjunctiva were normal [Oropharynx] : the oropharynx was normal with no thrush [Neck Appearance] : the appearance of the neck was normal [Auscultation Breath Sounds / Voice Sounds] : lungs were clear to auscultation bilaterally [Heart Sounds] : normal S1 and S2 [Edema] : there was no peripheral edema [Bowel Sounds] : normal bowel sounds [Abdomen Soft] : soft [No Palpable Adenopathy] : no palpable adenopathy [Nail Clubbing] : no clubbing  or cyanosis of the fingernails [] : no rash [No Focal Deficits] : no focal deficits [Affect] : the affect was normal [FreeTextEntry1] : LUE AVG with thrill

## 2024-06-25 ENCOUNTER — NON-APPOINTMENT (OUTPATIENT)
Age: 84
End: 2024-06-25

## 2024-06-25 ENCOUNTER — APPOINTMENT (OUTPATIENT)
Dept: OPHTHALMOLOGY | Facility: CLINIC | Age: 84
End: 2024-06-25
Payer: MEDICARE

## 2024-06-25 PROCEDURE — 99024 POSTOP FOLLOW-UP VISIT: CPT

## 2024-06-25 PROCEDURE — 76514 ECHO EXAM OF EYE THICKNESS: CPT

## 2024-07-09 ENCOUNTER — APPOINTMENT (OUTPATIENT)
Dept: OPHTHALMOLOGY | Facility: CLINIC | Age: 84
End: 2024-07-09
Payer: MEDICARE

## 2024-07-09 ENCOUNTER — NON-APPOINTMENT (OUTPATIENT)
Age: 84
End: 2024-07-09

## 2024-07-09 PROCEDURE — 92012 INTRM OPH EXAM EST PATIENT: CPT | Mod: 24

## 2024-07-19 ENCOUNTER — NON-APPOINTMENT (OUTPATIENT)
Age: 84
End: 2024-07-19

## 2024-07-22 ENCOUNTER — APPOINTMENT (OUTPATIENT)
Dept: NEUROLOGY | Facility: CLINIC | Age: 84
End: 2024-07-22
Payer: MEDICARE

## 2024-07-22 VITALS
SYSTOLIC BLOOD PRESSURE: 168 MMHG | HEART RATE: 81 BPM | BODY MASS INDEX: 39.96 KG/M2 | WEIGHT: 217.15 LBS | HEIGHT: 62 IN | DIASTOLIC BLOOD PRESSURE: 74 MMHG

## 2024-07-22 DIAGNOSIS — E78.00 PURE HYPERCHOLESTEROLEMIA, UNSPECIFIED: ICD-10-CM

## 2024-07-22 DIAGNOSIS — I65.29 OCCLUSION AND STENOSIS OF UNSPECIFIED CAROTID ARTERY: ICD-10-CM

## 2024-07-22 PROCEDURE — 99205 OFFICE O/P NEW HI 60 MIN: CPT

## 2024-07-22 NOTE — HISTORY OF PRESENT ILLNESS
[FreeTextEntry1] : Ms. Sagastume is a very pleasant 84-year-old woman who is here for evaluation of bilateral lower extremity generalized weakness.  She has orthopedic/knee issues which is also made walking difficult for her.  She has no neurological symptoms suggestive of TIA or ischemic

## 2024-07-22 NOTE — DISCUSSION/SUMMARY
[FreeTextEntry1] : Patient has no symptoms suggestive of stroke or TIA.  She is here for follow-up since she had a fall during March 2024 while she was in [place].  She had imaging (I was able to look at records on her phone)-MRI brain cervical thoracic and lumbar spine. At this point I have encouraged her to do as much physical therapy/exercise/walking. There is evidence of cerebrovascular small vessel disease on MRI brain.  I have also asked her associates to email me a copy of all of the neuroimaging that has been performed so it can be updated on all scripts.

## 2024-07-22 NOTE — PHYSICAL EXAM
[General Appearance - Alert] : alert [General Appearance - In No Acute Distress] : in no acute distress [Oriented To Time, Place, And Person] : oriented to person, place, and time [Impaired Insight] : insight and judgment were intact [Affect] : the affect was normal [Person] : oriented to person [Place] : oriented to place [Time] : oriented to time [Concentration Intact] : normal concentrating ability [Visual Intact] : visual attention was ~T not ~L decreased [Naming Objects] : no difficulty naming common objects [Repeating Phrases] : no difficulty repeating a phrase [Writing A Sentence] : no difficulty writing a sentence [Fluency] : fluency intact [Comprehension] : comprehension intact [Reading] : reading intact [Past History] : adequate knowledge of personal past history [Cranial Nerves Optic (II)] : visual acuity intact bilaterally,  visual fields full to confrontation, pupils equal round and reactive to light [Cranial Nerves Oculomotor (III)] : extraocular motion intact [Cranial Nerves Trigeminal (V)] : facial sensation intact symmetrically [Cranial Nerves Facial (VII)] : face symmetrical [Cranial Nerves Vestibulocochlear (VIII)] : hearing was intact bilaterally [Cranial Nerves Glossopharyngeal (IX)] : tongue and palate midline [Cranial Nerves Accessory (XI - Cranial And Spinal)] : head turning and shoulder shrug symmetric [Cranial Nerves Hypoglossal (XII)] : there was no tongue deviation with protrusion [Motor Tone] : muscle tone was normal in all four extremities [Motor Strength] : muscle strength was normal in all four extremities [No Muscle Atrophy] : normal bulk in all four extremities [Sensation Tactile Decrease] : light touch was intact [Abnormal Walk] : normal gait [Balance] : balance was intact [Past-pointing] : there was no past-pointing [Tremor] : no tremor present [2+] : Ankle jerk left 2+ [Plantar Reflex Right Only] : normal on the right [Plantar Reflex Left Only] : normal on the left [Sclera] : the sclera and conjunctiva were normal [PERRL With Normal Accommodation] : pupils were equal in size, round, reactive to light, with normal accommodation [Extraocular Movements] : extraocular movements were intact

## 2024-08-13 ENCOUNTER — APPOINTMENT (OUTPATIENT)
Dept: OPHTHALMOLOGY | Facility: CLINIC | Age: 84
End: 2024-08-13
Payer: MEDICARE

## 2024-08-13 ENCOUNTER — NON-APPOINTMENT (OUTPATIENT)
Age: 84
End: 2024-08-13

## 2024-08-13 PROCEDURE — 92012 INTRM OPH EXAM EST PATIENT: CPT | Mod: 24

## 2024-08-16 NOTE — DISCUSSION/SUMMARY
[FreeTextEntry1] : ---Assessment plan----------The patient has been referred here for further opinion regarding pulmonary problem,  84 y/o woman, esrd on home HD--admitted with RSV infection Thanks for allowing  me to participate  in the care of this patient.  Patient at this time  will follow  the above mentioned recommendations and return back for follow up visit. If you have any questions  I can be reached  at # [phone] (office).  Myesha Do MD, FCCP  Pulmonary, Critical Care and Sleep Medicine

## 2024-08-16 NOTE — DISCUSSION/SUMMARY
[FreeTextEntry1] : ---Assessment plan----------The patient has been referred here for further opinion regarding pulmonary problem,  82 y/o woman, esrd on home HD--admitted with RSV infection Thanks for allowing  me to participate  in the care of this patient.  Patient at this time  will follow  the above mentioned recommendations and return back for follow up visit. If you have any questions  I can be reached  at # [phone] (office).  Myesha Do MD, FCCP  Pulmonary, Critical Care and Sleep Medicine

## 2024-08-16 NOTE — HISTORY OF PRESENT ILLNESS
[Never] : never [TextBox_4] : This letter  is regarding your patient  who  attended pulmonary out patient office today.  I have reviewed  patient's  past history, social history, family history and medication list. I also  reviewed nurse practitioners/ and fellows  notes and assessment and agree with it.   The patient was referred by [facility]  84 y/o woman, esrd on home HD admitted with RSV infection IN [facility]   dec 2023----, as well as superimposed bacterial pna, resp failure wiht hypoxia and hypercapnea pt was tx with ceftriaxone/azitro, prednisone, BD--AT PRESNET IN TAPERING DOSES OF PREDNISONE-- bipap tried but pt did not tolerate. clincially improved  Pt feeling much better On prednisone taper duoneb using bid    ------No history of , fever, chills , rigors, chest pain, or hemoptysis. Questionable history of Raynaud's phenomenon. No h/o significant weight loss in last few months. No history of liver dysfunction , collagen vascular disorder or chronic thromboembolic disease. I would classify the patient's dyspnea as WHO  FUNCTIONAL CLASS II--------  ----Echo  date----12/2023 CONCLUSIONS:   1. Left ventricular cavity is normal. Left ventricular systolic function is low normal with an ejection fraction of 51 % by Villagomez's method of disks.  2. The left ventricular diastolic function is indeterminate.  3. Normal right ventricular cavity size, wall thickness, and systolic function.  4. The left atrium is moderately dilated.  5. The right atrium is mildly dilated in size.  6. There is mild mitral regurgitation. The mitral regurgitant jet is eccentrically directed.  7. There is trace tricuspid regurgitation. There is insufficient tricuspid regurgitation detected to calculate pulmonary artery systolic pressure.  8. No pericardial effusion seen.  9. No prior echocardiogram is available for comparison.-- ----PFT date---------N/A ----Ct scan date-----12/2023 IMPRESSION: Small airways process involving both lungs compatible with infectious  bronchiolitis.--

## 2024-08-16 NOTE — HISTORY OF PRESENT ILLNESS
[Never] : never [TextBox_4] : This letter  is regarding your patient  who  attended pulmonary out patient office today.  I have reviewed  patient's  past history, social history, family history and medication list. I also  reviewed nurse practitioners/ and fellows  notes and assessment and agree with it.   The patient was referred by [facility]  82 y/o woman, esrd on home HD admitted with RSV infection IN [facility]   dec 2023----, as well as superimposed bacterial pna, resp failure wiht hypoxia and hypercapnea pt was tx with ceftriaxone/azitro, prednisone, BD--AT PRESNET IN TAPERING DOSES OF PREDNISONE-- bipap tried but pt did not tolerate. clincially improved  Pt feeling much better On prednisone taper duoneb using bid    ------No history of , fever, chills , rigors, chest pain, or hemoptysis. Questionable history of Raynaud's phenomenon. No h/o significant weight loss in last few months. No history of liver dysfunction , collagen vascular disorder or chronic thromboembolic disease. I would classify the patient's dyspnea as WHO  FUNCTIONAL CLASS II--------  ----Echo  date----12/2023 CONCLUSIONS:   1. Left ventricular cavity is normal. Left ventricular systolic function is low normal with an ejection fraction of 51 % by Villagomez's method of disks.  2. The left ventricular diastolic function is indeterminate.  3. Normal right ventricular cavity size, wall thickness, and systolic function.  4. The left atrium is moderately dilated.  5. The right atrium is mildly dilated in size.  6. There is mild mitral regurgitation. The mitral regurgitant jet is eccentrically directed.  7. There is trace tricuspid regurgitation. There is insufficient tricuspid regurgitation detected to calculate pulmonary artery systolic pressure.  8. No pericardial effusion seen.  9. No prior echocardiogram is available for comparison.-- ----PFT date---------N/A ----Ct scan date-----12/2023 IMPRESSION: Small airways process involving both lungs compatible with infectious  bronchiolitis.--

## 2024-08-20 ENCOUNTER — APPOINTMENT (OUTPATIENT)
Dept: PULMONOLOGY | Facility: CLINIC | Age: 84
End: 2024-08-20
Payer: MEDICARE

## 2024-08-20 VITALS
HEART RATE: 81 BPM | TEMPERATURE: 97 F | DIASTOLIC BLOOD PRESSURE: 72 MMHG | RESPIRATION RATE: 15 BRPM | SYSTOLIC BLOOD PRESSURE: 154 MMHG | OXYGEN SATURATION: 93 %

## 2024-08-20 PROCEDURE — 99215 OFFICE O/P EST HI 40 MIN: CPT

## 2024-09-10 ENCOUNTER — APPOINTMENT (OUTPATIENT)
Dept: INTERNAL MEDICINE | Facility: CLINIC | Age: 84
End: 2024-09-10
Payer: MEDICARE

## 2024-09-10 VITALS
SYSTOLIC BLOOD PRESSURE: 133 MMHG | WEIGHT: 217 LBS | OXYGEN SATURATION: 93 % | DIASTOLIC BLOOD PRESSURE: 67 MMHG | HEIGHT: 62 IN | HEART RATE: 81 BPM | BODY MASS INDEX: 39.93 KG/M2

## 2024-09-10 DIAGNOSIS — Z99.2 END STAGE RENAL DISEASE: ICD-10-CM

## 2024-09-10 DIAGNOSIS — Z23 ENCOUNTER FOR IMMUNIZATION: ICD-10-CM

## 2024-09-10 DIAGNOSIS — I12.9 HYPERTENSIVE CHRONIC KIDNEY DISEASE WITH STAGE 1 THROUGH STAGE 4 CHRONIC KIDNEY DISEASE, OR UNSPECIFIED CHRONIC KIDNEY DISEASE: ICD-10-CM

## 2024-09-10 DIAGNOSIS — E11.3293 TYPE 2 DIABETES MELLITUS WITH MILD NONPROLIFERATIVE DIABETIC RETINOPATHY WITHOUT MACULAR EDEMA, BILATERAL: ICD-10-CM

## 2024-09-10 DIAGNOSIS — H18.519 ENDOTHELIAL CORNEAL DYSTROPHY, UNSPECIFIED EYE: ICD-10-CM

## 2024-09-10 DIAGNOSIS — Z79.4 TYPE 2 DIABETES MELLITUS WITH MILD NONPROLIFERATIVE DIABETIC RETINOPATHY WITHOUT MACULAR EDEMA, BILATERAL: ICD-10-CM

## 2024-09-10 DIAGNOSIS — E78.00 PURE HYPERCHOLESTEROLEMIA, UNSPECIFIED: ICD-10-CM

## 2024-09-10 DIAGNOSIS — I65.29 OCCLUSION AND STENOSIS OF UNSPECIFIED CAROTID ARTERY: ICD-10-CM

## 2024-09-10 DIAGNOSIS — N18.6 END STAGE RENAL DISEASE: ICD-10-CM

## 2024-09-10 DIAGNOSIS — E03.9 HYPOTHYROIDISM, UNSPECIFIED: ICD-10-CM

## 2024-09-10 PROCEDURE — 99214 OFFICE O/P EST MOD 30 MIN: CPT

## 2024-09-10 PROCEDURE — G2211 COMPLEX E/M VISIT ADD ON: CPT

## 2024-09-10 PROCEDURE — G0008: CPT

## 2024-09-10 PROCEDURE — 90662 IIV NO PRSV INCREASED AG IM: CPT

## 2024-09-10 RX ORDER — BLOOD-GLUCOSE METER
W/DEVICE EACH MISCELLANEOUS
Qty: 1 | Refills: 0 | Status: ACTIVE | COMMUNITY
Start: 2024-09-10 | End: 1900-01-01

## 2024-09-10 NOTE — HISTORY OF PRESENT ILLNESS
[FreeTextEntry1] : DM [de-identified] : vaccine- rec COVID, RSV, flu pt going to [place] in 2 wks.  reviewed 8/14/24 labs - elev K , hgb 10/9- pt had rpt labs yest but I don't have the results/  exercise- walks w walker at home alone hx of breast Ca- being f/u by Oncology.  had breast surg at [facility] [place]- 1/2024 c/o b/l shoulder pain- chronic but worse over past few mo but has good ROM . pt thinks it is due to laying down 4 hr for dialysis.  uses a walker at home exercise- stationary bike breast CA- f/u w Oncologist- has f/u- did mammo in Jan 2024 hypothyroid- compliant w meds.  reviewed 7/17/24 labs- TSH 2.59 fT4 1.3 DM-states had labs 8/3/23-reviewed on pt's phone -reviewed 7/17/24 labs-A1c 6.2.  I didn't see BUN/ Cr.  pt will send me the results, .  decr sugar in coffee fasting labs . doesn't ck post prandial glucose . home -130/50-60 reviewed 7/22/24 Ophth - Fuch's corneal dytrophy, glaucoma susp, dry eye, mild non prolif DM retinopathy chol- compliant w diet. taking statin every other day ESRD on dialysis- reviewed 7/18/18 Nephrology notes, 2/20/19 Surg notes- for Renal transplant evaluation reviewed 6/5/24 labs from dialysis- Cr 6.01, hgb 10.8, .  reviewed 4/23/24 A1c 6.0, TSH 4.37, free T4 1.2 reviewed1/30/24 Cardio notes- rec Nuclear stress test- reviewed 12/19/23 ECHO- mod dilated LA, mild dil RA, mild MR  pt was admitted 12/17/23 for Pneumonia, RSV end March- fell in [place]- went to [facility]- had MRI c , t , L  back, DJD arthropathy, cer disc plaques, abutment of C6 nerve root, impingement of nerves- L spine nerves, S1.  reviewed 3/31/24 MRI brain- chronic sm vessel ischemic chg, hemosiderin deposits.,  cerebral, cerebellar atrophy, minimal plaque- CCA, carotid bulbs. , vertebral art.  reviewed 7/22/24 Neuro notes- carotid art stenosis.  no evidence of CVA/ TIA. hx of RSV- in  12/2023- reviewed 6/19/24 ID notes and 8/20/24 Pulm notes

## 2024-09-12 ENCOUNTER — APPOINTMENT (OUTPATIENT)
Dept: OPHTHALMOLOGY | Facility: CLINIC | Age: 84
End: 2024-09-12
Payer: MEDICARE

## 2024-09-12 ENCOUNTER — NON-APPOINTMENT (OUTPATIENT)
Age: 84
End: 2024-09-12

## 2024-09-12 PROCEDURE — 92012 INTRM OPH EXAM EST PATIENT: CPT

## 2024-09-23 ENCOUNTER — RX RENEWAL (OUTPATIENT)
Age: 84
End: 2024-09-23

## 2024-11-14 ENCOUNTER — NON-APPOINTMENT (OUTPATIENT)
Age: 84
End: 2024-11-14

## 2024-11-25 ENCOUNTER — APPOINTMENT (OUTPATIENT)
Dept: PULMONOLOGY | Facility: CLINIC | Age: 84
End: 2024-11-25
Payer: MEDICARE

## 2024-11-25 DIAGNOSIS — R05.3 CHRONIC COUGH: ICD-10-CM

## 2024-11-25 PROCEDURE — 99443: CPT | Mod: 93

## 2024-11-25 RX ORDER — AZITHROMYCIN 250 MG/1
250 TABLET, FILM COATED ORAL
Qty: 1 | Refills: 0 | Status: ACTIVE | COMMUNITY
Start: 2024-11-25 | End: 1900-01-01

## 2024-11-25 NOTE — REVIEW OF SYSTEMS
[Cough] : cough [Thyroid Problem] : thyroid problem [Fever] : no fever [Dry Eyes] : no dry eyes [Chest Discomfort] : no chest discomfort [Hay Fever] : no hay fever [GERD] : no gerd [Raynaud] : no raynaud [TextBox_94] : H/O ARTHRITIS [TextBox_144] : H/O HYPOTHYROIDISM

## 2024-11-25 NOTE — HISTORY OF PRESENT ILLNESS
[Home] : at home, [unfilled] , at the time of the visit. [Verbal consent obtained from patient] : the patient, [unfilled] [Never] : never [TextBox_4] : This letter  is regarding your patient  who  attended pulmonary out patient office today.  I have reviewed  patient's  past history, social history, family history and medication list. I also  reviewed nurse practitioners/ and fellows  notes and assessment and agree with it.   The patient was referred by [facility]  85 y/o woman, esrd on home HD admitted with RSV infection IN [facility]   dec 2023----, as well as superimposed bacterial pna, resp failure wiht hypoxia and hypercapnea pt was tx with ceftriaxone/azitro, prednisone, BD--AT PRESNET IN TAPERING DOSES OF PREDNISONE-- bipap tried but pt did not tolerate. clincially improved  Pt feeling much better On prednisone taper duoneb using bid    ------No history of , fever, chills , rigors, chest pain, or hemoptysis. Questionable history of Raynaud's phenomenon. No h/o significant weight loss in last few months. No history of liver dysfunction , collagen vascular disorder or chronic thromboembolic disease. I would classify the patient's dyspnea as WHO  FUNCTIONAL CLASS II--------  ----Echo  date----12/2023 CONCLUSIONS:   1. Left ventricular cavity is normal. Left ventricular systolic function is low normal with an ejection fraction of 51 % by Murphy's method of disks.  2. The left ventricular diastolic function is indeterminate.  3. Normal right ventricular cavity size, wall thickness, and systolic function.  4. The left atrium is moderately dilated.  5. The right atrium is mildly dilated in size.  6. There is mild mitral regurgitation. The mitral regurgitant jet is eccentrically directed.  7. There is trace tricuspid regurgitation. There is insufficient tricuspid regurgitation detected to calculate pulmonary artery systolic pressure.  8. No pericardial effusion seen.  9. No prior echocardiogram is available for comparison.-- ----PFT date---------N/A ----Ct scan date-----12/2023 IMPRESSION: Small airways process involving both lungs compatible with infectious  bronchiolitis.--   AUGUST 2024----feels much better no respiratory complaints-------  11/2024  acute TTM for URI- cough/congestion, afebrile- z pack

## 2024-11-25 NOTE — END OF VISIT
[FreeTextEntry3] :   I, Dr. Wang Orozco  personally performed the evaluation and management (E/M) services for this established patient who presents today with (a) new problem(s)/exacerbation of (an) existing condition(s). That E/M includes conducting the clinically appropriate interval history &/or exam, assessing all new/exacerbated conditions, and establishing a new plan of care. Today, my AZALIA, Felisa Nava NP, , was here to observe my evaluation and management service for this new problem/exacerbated condition and follow the plan of care established by me going forward. [Time Spent: ___ minutes] : I have spent [unfilled] minutes of time on the encounter which excludes teaching and separately reported services.

## 2024-11-25 NOTE — DISCUSSION/SUMMARY
[FreeTextEntry1] : ---Assessment plan----------The patient has been referred here for further opinion regarding pulmonary problem,  83 y/o woman, esrd on home HD--admitted    IN JAN 2024with RSV infection 1 ON HD --AS PER RENAL 2 slight URI symptoms will treated with Z-Delvin------ 3 echo as per cardiology F/U PRN   Thanks for allowing  me to participate  in the care of this patient.  Patient at this time  will follow  the above mentioned recommendations and return back for follow up visit. If you have any questions  I can be reached  at # [phone] (office).  Wang Orozco MD, FCCP  Pulmonary, Critical Care and Sleep Medicine

## 2024-12-03 ENCOUNTER — RX CHANGE (OUTPATIENT)
Age: 84
End: 2024-12-03

## 2024-12-10 ENCOUNTER — APPOINTMENT (OUTPATIENT)
Dept: OPHTHALMOLOGY | Facility: CLINIC | Age: 84
End: 2024-12-10

## 2025-01-02 ENCOUNTER — RX RENEWAL (OUTPATIENT)
Age: 85
End: 2025-01-02

## 2025-01-08 ENCOUNTER — APPOINTMENT (OUTPATIENT)
Dept: INTERNAL MEDICINE | Facility: CLINIC | Age: 85
End: 2025-01-08
Payer: MEDICARE

## 2025-01-08 VITALS
OXYGEN SATURATION: 96 % | WEIGHT: 209.66 LBS | HEART RATE: 91 BPM | BODY MASS INDEX: 38.58 KG/M2 | HEIGHT: 62 IN | DIASTOLIC BLOOD PRESSURE: 56 MMHG | SYSTOLIC BLOOD PRESSURE: 130 MMHG

## 2025-01-08 DIAGNOSIS — J43.9 EMPHYSEMA, UNSPECIFIED: ICD-10-CM

## 2025-01-08 DIAGNOSIS — E11.3293 TYPE 2 DIABETES MELLITUS WITH MILD NONPROLIFERATIVE DIABETIC RETINOPATHY WITHOUT MACULAR EDEMA, BILATERAL: ICD-10-CM

## 2025-01-08 DIAGNOSIS — N18.5 CHRONIC KIDNEY DISEASE, STAGE 5: ICD-10-CM

## 2025-01-08 DIAGNOSIS — N18.6 END STAGE RENAL DISEASE: ICD-10-CM

## 2025-01-08 DIAGNOSIS — J98.4 EMPHYSEMA, UNSPECIFIED: ICD-10-CM

## 2025-01-08 DIAGNOSIS — C50.911 MALIGNANT NEOPLASM OF UNSPECIFIED SITE OF RIGHT FEMALE BREAST: ICD-10-CM

## 2025-01-08 DIAGNOSIS — E27.8 OTHER SPECIFIED DISORDERS OF ADRENAL GLAND: ICD-10-CM

## 2025-01-08 DIAGNOSIS — E78.00 PURE HYPERCHOLESTEROLEMIA, UNSPECIFIED: ICD-10-CM

## 2025-01-08 DIAGNOSIS — E03.9 HYPOTHYROIDISM, UNSPECIFIED: ICD-10-CM

## 2025-01-08 DIAGNOSIS — Z99.2 END STAGE RENAL DISEASE: ICD-10-CM

## 2025-01-08 DIAGNOSIS — Z79.4 TYPE 2 DIABETES MELLITUS WITH MILD NONPROLIFERATIVE DIABETIC RETINOPATHY WITHOUT MACULAR EDEMA, BILATERAL: ICD-10-CM

## 2025-01-08 PROCEDURE — G2211 COMPLEX E/M VISIT ADD ON: CPT

## 2025-01-08 PROCEDURE — 99214 OFFICE O/P EST MOD 30 MIN: CPT

## 2025-01-08 RX ORDER — INSULIN GLARGINE 100 [IU]/ML
100 INJECTION, SOLUTION SUBCUTANEOUS AT BEDTIME
Qty: 3 | Refills: 1 | Status: ACTIVE | COMMUNITY
Start: 1900-01-01 | End: 1900-01-01

## 2025-01-08 NOTE — HISTORY OF PRESENT ILLNESS
[FreeTextEntry1] : DM [de-identified] : vaccine- rec COVID, RSV.  got flu- 9/2024 pt using walker at home but outside the home, uses wheelchair.  pt gets tired after dialysis reviewed 12/4/24 labs- Cr 5.76, ferrous 438, A1c 5.5,HDL 53, LDL 77, TSH 2.96, free T4-1.2- viewed on pt's phone cough-resolved.  reviewed 11/25/24 PUlm notes reviewed 1/24/24 CT chest - thickened adrenal gland, calcif coronary art. -   thickened adrenal gland- pt has appt w [facility], Dr. Orozco hx of breast Ca- being f/u by Oncology- has appt 4/2025.  at [facility] [place]- c/o b/l shoulder pain- chronic but worse over past few mo but has good ROM . pt thinks it is due to laying down 4 hr for dialysis.- gets PT in [facility] hypothyroid- compliant w meds.   DM-states had labs 8/3/23-reviewed on pt's phone -decr sugar in coffee.  pt is doing is a vegan and gluten free diet.  pt decr lantus to 9 U b/c decr gluc w chg in diet. fasting glucose 104.   doesn't ck post prandial glucose . home -130/50-60 Fuchs, mild non prolif DM retinopathy, glaucoma susp- reviewed 9/12/24 OPhth notes chol- compliant w diet. taking statin ESRD on dialysis- reviewed 7/18/18 Nephrology notes, 2/20/19 Surg notes- for Renal transplant evaluation reviewed1/30/24 Cardio notes- rec Nuclear stress test- reviewed 12/19/23 ECHO- mod dilated LA, mild dil RA, mild MR end March 2024- fell in [place]- pt has no back pain had MRI c , t , L  back, DJD arthropathy, cer disc plaques, abutment of C6 nerve root, impingement of nerves- L spine nerves, S1.  reviewed 3/31/24 MRI brain- chronic sm vessel ischemic chg, hemosiderin deposits.,  cerebral, cerebellar atrophy, minimal plaque- CCA, carotid bulbs. , vertebral art.  reviewed 7/22/24 Neuro notes- carotid art stenosis.  no evidence of CVA/ TIA.

## 2025-01-08 NOTE — PLAN
[FreeTextEntry1] : pt going to [place] in 2 wk and will return in April- and will ron stress test. DM- pt can decr lantus to keep fasting glucose at 110

## 2025-01-09 ENCOUNTER — APPOINTMENT (OUTPATIENT)
Dept: OPHTHALMOLOGY | Facility: CLINIC | Age: 85
End: 2025-01-09
Payer: MEDICARE

## 2025-01-09 PROCEDURE — 92012 INTRM OPH EXAM EST PATIENT: CPT

## 2025-01-14 ENCOUNTER — NON-APPOINTMENT (OUTPATIENT)
Age: 85
End: 2025-01-14

## 2025-01-14 ENCOUNTER — APPOINTMENT (OUTPATIENT)
Dept: OPHTHALMOLOGY | Facility: CLINIC | Age: 85
End: 2025-01-14
Payer: MEDICARE

## 2025-01-14 PROCEDURE — 92012 INTRM OPH EXAM EST PATIENT: CPT

## 2025-02-11 ENCOUNTER — RX RENEWAL (OUTPATIENT)
Age: 85
End: 2025-02-11

## 2025-02-11 RX ORDER — FLASH GLUCOSE SENSOR
KIT MISCELLANEOUS
Qty: 3 | Refills: 11 | Status: ACTIVE | COMMUNITY
Start: 2025-02-11 | End: 1900-01-01

## 2025-03-03 ENCOUNTER — RX CHANGE (OUTPATIENT)
Age: 85
End: 2025-03-03

## 2025-05-07 ENCOUNTER — APPOINTMENT (OUTPATIENT)
Dept: INTERNAL MEDICINE | Facility: CLINIC | Age: 85
End: 2025-05-07

## 2025-05-07 VITALS
BODY MASS INDEX: 38.34 KG/M2 | DIASTOLIC BLOOD PRESSURE: 60 MMHG | OXYGEN SATURATION: 95 % | WEIGHT: 208.33 LBS | HEIGHT: 62 IN | SYSTOLIC BLOOD PRESSURE: 120 MMHG | HEART RATE: 68 BPM

## 2025-05-07 DIAGNOSIS — N18.5 CHRONIC KIDNEY DISEASE, STAGE 5: ICD-10-CM

## 2025-05-07 DIAGNOSIS — J44.9 CHRONIC OBSTRUCTIVE PULMONARY DISEASE, UNSPECIFIED: ICD-10-CM

## 2025-05-07 DIAGNOSIS — N18.6 END STAGE RENAL DISEASE: ICD-10-CM

## 2025-05-07 DIAGNOSIS — C50.911 MALIGNANT NEOPLASM OF UNSPECIFIED SITE OF RIGHT FEMALE BREAST: ICD-10-CM

## 2025-05-07 DIAGNOSIS — D63.1 CHRONIC KIDNEY DISEASE, STAGE 5: ICD-10-CM

## 2025-05-07 DIAGNOSIS — H18.519 ENDOTHELIAL CORNEAL DYSTROPHY, UNSPECIFIED EYE: ICD-10-CM

## 2025-05-07 DIAGNOSIS — E27.8 OTHER SPECIFIED DISORDERS OF ADRENAL GLAND: ICD-10-CM

## 2025-05-07 DIAGNOSIS — J98.4 CHRONIC OBSTRUCTIVE PULMONARY DISEASE, UNSPECIFIED: ICD-10-CM

## 2025-05-07 DIAGNOSIS — K21.9 GASTRO-ESOPHAGEAL REFLUX DISEASE W/OUT ESOPHAGITIS: ICD-10-CM

## 2025-05-07 DIAGNOSIS — E78.00 PURE HYPERCHOLESTEROLEMIA, UNSPECIFIED: ICD-10-CM

## 2025-05-07 DIAGNOSIS — Z99.2 END STAGE RENAL DISEASE: ICD-10-CM

## 2025-05-07 PROCEDURE — 99214 OFFICE O/P EST MOD 30 MIN: CPT

## 2025-05-07 PROCEDURE — G2211 COMPLEX E/M VISIT ADD ON: CPT
